# Patient Record
Sex: FEMALE | Race: BLACK OR AFRICAN AMERICAN | NOT HISPANIC OR LATINO | Employment: UNEMPLOYED | URBAN - METROPOLITAN AREA
[De-identification: names, ages, dates, MRNs, and addresses within clinical notes are randomized per-mention and may not be internally consistent; named-entity substitution may affect disease eponyms.]

---

## 2023-02-20 PROBLEM — S82.142A CLOSED FRACTURE OF LEFT TIBIAL PLATEAU: Status: ACTIVE | Noted: 2023-02-20

## 2023-02-20 PROBLEM — E66.3 OVERWEIGHT: Status: ACTIVE | Noted: 2023-02-20

## 2023-02-20 PROBLEM — Z95.810 ICD (IMPLANTABLE CARDIOVERTER-DEFIBRILLATOR) IN PLACE: Status: ACTIVE | Noted: 2023-02-20
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
## 2023-02-20 PROBLEM — H90.6 MIXED CONDUCTIVE AND SENSORINEURAL HEARING LOSS OF BOTH EARS: Status: ACTIVE | Noted: 2023-02-20

## 2023-02-20 PROBLEM — I47.29 NON-SUSTAINED VENTRICULAR TACHYCARDIA (MULTI): Status: ACTIVE | Noted: 2023-02-20

## 2023-02-20 PROBLEM — E78.5 HYPERLIPIDEMIA: Status: ACTIVE | Noted: 2023-02-20

## 2023-02-20 PROBLEM — S93.601A SPRAIN OF RIGHT FOOT: Status: ACTIVE | Noted: 2023-02-20

## 2023-02-20 PROBLEM — M25.562 LEFT KNEE PAIN: Status: ACTIVE | Noted: 2023-02-20

## 2023-02-20 PROBLEM — N18.9 CHRONIC KIDNEY DISEASE: Status: ACTIVE | Noted: 2023-02-20

## 2023-02-20 PROBLEM — H61.23 CERUMEN DEBRIS ON TYMPANIC MEMBRANE OF BOTH EARS: Status: ACTIVE | Noted: 2023-02-20

## 2023-02-20 PROBLEM — M75.101 ROTATOR CUFF TEAR ARTHROPATHY OF RIGHT SHOULDER: Status: ACTIVE | Noted: 2023-02-20

## 2023-02-20 PROBLEM — Q74.0 ASYMMETRY OF CLAVICLES: Status: ACTIVE | Noted: 2023-02-20

## 2023-02-20 PROBLEM — M25.511 RIGHT SHOULDER PAIN: Status: ACTIVE | Noted: 2023-02-20

## 2023-02-20 PROBLEM — H52.13 MYOPIA OF BOTH EYES WITH REGULAR ASTIGMATISM: Status: ACTIVE | Noted: 2023-02-20

## 2023-02-20 PROBLEM — R07.81 RIB PAIN: Status: ACTIVE | Noted: 2023-02-20

## 2023-02-20 PROBLEM — G51.0 7TH NERVE PALSY: Status: ACTIVE | Noted: 2023-02-20

## 2023-02-20 PROBLEM — G56.03 BILATERAL CARPAL TUNNEL SYNDROME: Status: ACTIVE | Noted: 2023-02-20
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
## 2023-02-20 PROBLEM — H52.223 MYOPIA OF BOTH EYES WITH REGULAR ASTIGMATISM: Status: ACTIVE | Noted: 2023-02-20

## 2023-02-20 PROBLEM — H80.93 OTOSCLEROSIS OF BOTH EARS: Status: ACTIVE | Noted: 2023-02-20

## 2023-02-20 PROBLEM — R00.1 BRADYCARDIA: Status: ACTIVE | Noted: 2023-02-20

## 2023-02-20 PROBLEM — H53.19 VITREOUS FLASHES OF BOTH EYES: Status: ACTIVE | Noted: 2023-02-20

## 2023-02-20 PROBLEM — L03.032 CELLULITIS OF FOURTH TOE OF LEFT FOOT: Status: ACTIVE | Noted: 2023-02-20

## 2023-02-20 PROBLEM — N76.0 VAGINITIS: Status: ACTIVE | Noted: 2023-02-20

## 2023-02-20 PROBLEM — J30.9 ALLERGIC RHINITIS: Status: ACTIVE | Noted: 2023-02-20

## 2023-02-20 PROBLEM — H52.4 ASTIGMATISM OF BOTH EYES WITH PRESBYOPIA: Status: ACTIVE | Noted: 2023-02-20

## 2023-02-20 PROBLEM — K21.9 GERD (GASTROESOPHAGEAL REFLUX DISEASE): Status: ACTIVE | Noted: 2023-02-20

## 2023-02-20 PROBLEM — S82.899A AVULSION FRACTURE OF ANKLE: Status: ACTIVE | Noted: 2023-02-20

## 2023-02-20 PROBLEM — S82.009A PATELLAR FRACTURE: Status: ACTIVE | Noted: 2023-02-20

## 2023-02-20 PROBLEM — M26.69 TMJ CREPITUS: Status: ACTIVE | Noted: 2023-02-20

## 2023-02-20 PROBLEM — E55.9 VITAMIN D DEFICIENCY: Status: ACTIVE | Noted: 2023-02-20

## 2023-02-20 PROBLEM — K59.00 CONSTIPATED: Status: ACTIVE | Noted: 2023-02-20

## 2023-02-20 PROBLEM — D17.22 LIPOMA OF LEFT UPPER EXTREMITY: Status: ACTIVE | Noted: 2023-02-20

## 2023-02-20 PROBLEM — K64.4 EXTERNAL HEMORRHOID: Status: ACTIVE | Noted: 2023-02-20

## 2023-02-20 PROBLEM — H81.10 BPPV (BENIGN PAROXYSMAL POSITIONAL VERTIGO): Status: ACTIVE | Noted: 2023-02-20

## 2023-02-20 PROBLEM — H66.91 OTITIS MEDIA, RIGHT: Status: ACTIVE | Noted: 2023-02-20

## 2023-02-20 PROBLEM — B18.2 CHRONIC HEPATITIS C WITHOUT HEPATIC COMA (MULTI): Status: ACTIVE | Noted: 2023-02-20

## 2023-02-20 PROBLEM — M54.2 CERVICAL PAIN: Status: ACTIVE | Noted: 2023-02-20

## 2023-02-20 PROBLEM — H43.813 PVD (POSTERIOR VITREOUS DETACHMENT), BOTH EYES: Status: ACTIVE | Noted: 2023-02-20

## 2023-02-20 PROBLEM — I10 HYPERTENSION: Status: ACTIVE | Noted: 2023-02-20

## 2023-02-20 PROBLEM — H52.03 HYPERMETROPIA OF BOTH EYES: Status: ACTIVE | Noted: 2023-02-20

## 2023-02-20 PROBLEM — L60.8 TOENAIL DEFORMITY: Status: ACTIVE | Noted: 2023-02-20

## 2023-02-20 PROBLEM — R09.81 SINUS CONGESTION: Status: ACTIVE | Noted: 2023-02-20

## 2023-02-20 PROBLEM — R04.0 NASAL BLEEDING: Status: ACTIVE | Noted: 2023-02-20

## 2023-02-20 PROBLEM — I42.9 CARDIOMYOPATHY (MULTI): Status: ACTIVE | Noted: 2023-02-20

## 2023-02-20 PROBLEM — H92.01 RIGHT EAR PAIN: Status: ACTIVE | Noted: 2023-02-20

## 2023-02-20 PROBLEM — L60.3 NAIL DYSTROPHY: Status: ACTIVE | Noted: 2023-02-20

## 2023-02-20 PROBLEM — J32.9 CHRONIC SINUSITIS: Status: ACTIVE | Noted: 2023-02-20

## 2023-02-20 PROBLEM — H33.312 RETINAL TEAR OF LEFT EYE: Status: ACTIVE | Noted: 2023-02-20

## 2023-02-20 PROBLEM — M79.675 TOE PAIN, LEFT: Status: ACTIVE | Noted: 2023-02-20

## 2023-02-20 PROBLEM — S12.100A C2 CERVICAL FRACTURE (MULTI): Status: ACTIVE | Noted: 2023-02-20

## 2023-02-20 PROBLEM — S83.402A SPRAIN OF COLLATERAL LIGAMENT OF LEFT KNEE: Status: ACTIVE | Noted: 2023-02-20

## 2023-02-20 PROBLEM — H52.203 ASTIGMATISM OF BOTH EYES WITH PRESBYOPIA: Status: ACTIVE | Noted: 2023-02-20

## 2023-02-20 PROBLEM — M12.811 ROTATOR CUFF TEAR ARTHROPATHY OF RIGHT SHOULDER: Status: ACTIVE | Noted: 2023-02-20

## 2023-02-20 PROBLEM — L08.9 TOE INFECTION: Status: ACTIVE | Noted: 2023-02-20

## 2023-02-20 PROBLEM — R63.4 WEIGHT LOSS, UNINTENTIONAL: Status: ACTIVE | Noted: 2023-02-20

## 2023-02-20 RX ORDER — METHOCARBAMOL 500 MG/1
TABLET, FILM COATED ORAL
COMMUNITY
Start: 2022-03-30

## 2023-02-20 RX ORDER — METOPROLOL SUCCINATE 50 MG/1
TABLET, EXTENDED RELEASE ORAL
COMMUNITY
End: 2023-09-12 | Stop reason: ALTCHOICE

## 2023-02-20 RX ORDER — HYDRALAZINE HYDROCHLORIDE 10 MG/1
TABLET, FILM COATED ORAL
COMMUNITY
End: 2023-12-20 | Stop reason: SDUPTHER

## 2023-02-20 RX ORDER — TIZANIDINE 4 MG/1
TABLET ORAL
COMMUNITY
Start: 2022-09-21 | End: 2023-12-20 | Stop reason: WASHOUT

## 2023-02-20 RX ORDER — SENNOSIDES 25 MG/1
TABLET, FILM COATED ORAL
COMMUNITY
Start: 2022-07-14

## 2023-02-20 RX ORDER — DICLOFENAC SODIUM 10 MG/G
GEL TOPICAL
COMMUNITY
End: 2024-04-12 | Stop reason: ALTCHOICE

## 2023-02-20 RX ORDER — ISOSORBIDE DINITRATE 20 MG/1
TABLET ORAL
COMMUNITY
Start: 2020-10-25 | End: 2023-12-20 | Stop reason: ALTCHOICE

## 2023-02-20 RX ORDER — CALCIUM CARB/VITAMIN D3/VIT K1 500-500-40
TABLET,CHEWABLE ORAL
COMMUNITY
Start: 2020-02-04

## 2023-02-20 RX ORDER — MUPIROCIN 20 MG/G
OINTMENT TOPICAL
COMMUNITY
End: 2023-12-20 | Stop reason: ALTCHOICE

## 2023-02-20 RX ORDER — ACETAMINOPHEN 500 MG
TABLET ORAL
COMMUNITY
Start: 2022-07-14 | End: 2023-10-18

## 2023-02-20 RX ORDER — AMIODARONE HYDROCHLORIDE 200 MG/1
TABLET ORAL
COMMUNITY
End: 2023-03-13 | Stop reason: SDUPTHER

## 2023-02-20 RX ORDER — DOCUSATE SODIUM 100 MG/1
CAPSULE, LIQUID FILLED ORAL
COMMUNITY
Start: 2022-02-24 | End: 2023-12-20 | Stop reason: ALTCHOICE

## 2023-02-20 RX ORDER — NACL/NAHCO3/HYALURON SOD/ALOE 0.9 %
SPRAY GEL (ML) NASAL
COMMUNITY
Start: 2021-01-26

## 2023-02-20 RX ORDER — FLUTICASONE PROPIONATE 50 MCG
SPRAY, SUSPENSION (ML) NASAL
COMMUNITY
Start: 2013-07-08 | End: 2024-03-20 | Stop reason: WASHOUT

## 2023-02-20 RX ORDER — SPIRONOLACTONE 25 MG/1
TABLET ORAL
COMMUNITY
End: 2023-09-28 | Stop reason: SDUPTHER

## 2023-02-20 RX ORDER — CETIRIZINE HYDROCHLORIDE 10 MG/1
10 TABLET ORAL DAILY PRN
COMMUNITY
Start: 2022-07-25

## 2023-02-20 RX ORDER — PSYLLIUM HUSK 3.4 G/5.8G
POWDER ORAL
COMMUNITY
Start: 2022-03-04

## 2023-03-13 ENCOUNTER — TELEPHONE (OUTPATIENT)
Dept: PRIMARY CARE | Facility: CLINIC | Age: 68
End: 2023-03-13
Payer: COMMERCIAL

## 2023-03-13 DIAGNOSIS — I47.29 NON-SUSTAINED VENTRICULAR TACHYCARDIA (MULTI): Primary | ICD-10-CM

## 2023-03-13 RX ORDER — AMIODARONE HYDROCHLORIDE 200 MG/1
200 TABLET ORAL DAILY
Qty: 30 TABLET | Refills: 0 | Status: SHIPPED | OUTPATIENT
Start: 2023-03-13 | End: 2023-04-20 | Stop reason: SDUPTHER

## 2023-03-13 NOTE — TELEPHONE ENCOUNTER
Patient called requesting refill for Amiodarone 200mg to be sent to pharmacy on file. She was last seen 12/06/2022 and has a scheduled appt for 03/21/2023

## 2023-03-21 ENCOUNTER — OFFICE VISIT (OUTPATIENT)
Dept: PRIMARY CARE | Facility: CLINIC | Age: 68
End: 2023-03-21
Payer: COMMERCIAL

## 2023-03-21 ENCOUNTER — LAB (OUTPATIENT)
Dept: LAB | Facility: LAB | Age: 68
End: 2023-03-21
Payer: COMMERCIAL

## 2023-03-21 VITALS
SYSTOLIC BLOOD PRESSURE: 121 MMHG | TEMPERATURE: 97.5 F | OXYGEN SATURATION: 93 % | BODY MASS INDEX: 23.12 KG/M2 | HEIGHT: 59 IN | WEIGHT: 114.7 LBS | HEART RATE: 69 BPM | DIASTOLIC BLOOD PRESSURE: 79 MMHG

## 2023-03-21 DIAGNOSIS — B18.2 CHRONIC HEPATITIS C WITHOUT HEPATIC COMA (MULTI): ICD-10-CM

## 2023-03-21 DIAGNOSIS — I10 PRIMARY HYPERTENSION: ICD-10-CM

## 2023-03-21 DIAGNOSIS — I50.20 HFREF (HEART FAILURE WITH REDUCED EJECTION FRACTION) (MULTI): ICD-10-CM

## 2023-03-21 DIAGNOSIS — M81.0 AGE-RELATED OSTEOPOROSIS WITHOUT CURRENT PATHOLOGICAL FRACTURE: ICD-10-CM

## 2023-03-21 DIAGNOSIS — R07.81 RIB PAIN: ICD-10-CM

## 2023-03-21 DIAGNOSIS — Z00.00 HEALTH CARE MAINTENANCE: Primary | ICD-10-CM

## 2023-03-21 DIAGNOSIS — Z12.31 ENCOUNTER FOR SCREENING MAMMOGRAM FOR MALIGNANT NEOPLASM OF BREAST: ICD-10-CM

## 2023-03-21 LAB
ALANINE AMINOTRANSFERASE (SGPT) (U/L) IN SER/PLAS: 52 U/L (ref 7–45)
ALBUMIN (G/DL) IN SER/PLAS: 4.3 G/DL (ref 3.4–5)
ALKALINE PHOSPHATASE (U/L) IN SER/PLAS: 69 U/L (ref 33–136)
ASPARTATE AMINOTRANSFERASE (SGOT) (U/L) IN SER/PLAS: 45 U/L (ref 9–39)
BILIRUBIN DIRECT (MG/DL) IN SER/PLAS: 0.1 MG/DL (ref 0–0.3)
BILIRUBIN TOTAL (MG/DL) IN SER/PLAS: 0.4 MG/DL (ref 0–1.2)
PROTEIN TOTAL: 7.6 G/DL (ref 6.4–8.2)

## 2023-03-21 PROCEDURE — 83010 ASSAY OF HAPTOGLOBIN QUANT: CPT

## 2023-03-21 PROCEDURE — 82247 BILIRUBIN TOTAL: CPT

## 2023-03-21 PROCEDURE — 1036F TOBACCO NON-USER: CPT | Performed by: STUDENT IN AN ORGANIZED HEALTH CARE EDUCATION/TRAINING PROGRAM

## 2023-03-21 PROCEDURE — 84460 ALANINE AMINO (ALT) (SGPT): CPT

## 2023-03-21 PROCEDURE — 1159F MED LIST DOCD IN RCRD: CPT | Performed by: STUDENT IN AN ORGANIZED HEALTH CARE EDUCATION/TRAINING PROGRAM

## 2023-03-21 PROCEDURE — 82172 ASSAY OF APOLIPOPROTEIN: CPT

## 2023-03-21 PROCEDURE — 83883 ASSAY NEPHELOMETRY NOT SPEC: CPT

## 2023-03-21 PROCEDURE — 99214 OFFICE O/P EST MOD 30 MIN: CPT | Performed by: STUDENT IN AN ORGANIZED HEALTH CARE EDUCATION/TRAINING PROGRAM

## 2023-03-21 PROCEDURE — 87522 HEPATITIS C REVRS TRNSCRPJ: CPT

## 2023-03-21 PROCEDURE — 3074F SYST BP LT 130 MM HG: CPT | Performed by: STUDENT IN AN ORGANIZED HEALTH CARE EDUCATION/TRAINING PROGRAM

## 2023-03-21 PROCEDURE — 80076 HEPATIC FUNCTION PANEL: CPT

## 2023-03-21 PROCEDURE — 3078F DIAST BP <80 MM HG: CPT | Performed by: STUDENT IN AN ORGANIZED HEALTH CARE EDUCATION/TRAINING PROGRAM

## 2023-03-21 PROCEDURE — 36415 COLL VENOUS BLD VENIPUNCTURE: CPT

## 2023-03-21 PROCEDURE — 82977 ASSAY OF GGT: CPT

## 2023-03-21 ASSESSMENT — PAIN SCALES - GENERAL: PAINLEVEL: 0-NO PAIN

## 2023-03-21 ASSESSMENT — ENCOUNTER SYMPTOMS: OCCASIONAL FEELINGS OF UNSTEADINESS: 0

## 2023-03-21 NOTE — PROGRESS NOTES
"Subjective   Reason for Visit: Sandy Smith is an 67 y.o. female here for a Medicare Wellness visit.          Reviewed all medications by prescribing practitioner or clinical pharmacist (such as prescriptions, OTCs, herbal therapies and supplements) and documented in the medical record.    HPI    Patient Care Team:  Tanmay Whitten MD as PCP - General  DERRICK Hidalgo-CNP as PCP - [employer] PCP     Review of Systems    Objective   Vitals:  /79 (BP Location: Right arm, Patient Position: Sitting, BP Cuff Size: Adult)   Pulse 69   Temp 36.4 °C (97.5 °F) (Temporal)   Ht 1.499 m (4' 11\")   Wt 52 kg (114 lb 11.2 oz)   SpO2 93%   BMI 23.17 kg/m²       Physical Exam    Assessment/Plan   Problem List Items Addressed This Visit          Circulatory    Hypertension       Digestive    Chronic hepatitis C without hepatic coma (CMS/HCC)    Relevant Orders    Referral to Hepatology    Hepatitis C Virus (HCV) FibroSure    Hepatitis C RNA, Quantitative, PCR    Hepatic function panel       Musculoskeletal    Rib pain     Other Visit Diagnoses       Health care maintenance    -  Primary    Relevant Orders    XR DEXA bone density    BI mammo bilateral screening tomosynthesis    Vascular US abdominal aorta anuerysm AAA screening    HFrEF (heart failure with reduced ejection fraction) (CMS/HCC)        Age-related osteoporosis without current pathological fracture        Relevant Orders    XR DEXA bone density    Encounter for screening mammogram for malignant neoplasm of breast        Relevant Orders    BI mammo bilateral screening tomosynthesis               "
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
## 2023-03-22 LAB
HCV PCR QUANT: ABNORMAL IU/ML
HCV RNA, PCR LOG: 6.15 LOG10 IU/ML

## 2023-03-24 LAB
ALPHA 2-MACROGLOBULINS, QN (LC): 407 MG/DL (ref 110–276)
ALT (SGPT) P5P(LC): 65 IU/L (ref 0–40)
APOLIPOPROTEIN A-1(LC): 180 MG/DL (ref 116–209)
BILIRUBIN, TOTAL(LC): 0.3 MG/DL (ref 0–1.2)
COMMENT:(HCVFS): ABNORMAL
FIBROSIS SCORE(HCVFS): 0.52 (ref 0–0.21)
FIBROSIS SCORING:: ABNORMAL
FIBROSIS STAGE(HCVFS): ABNORMAL
GGT(LC): 95 IU/L (ref 0–60)
HAPTOGLOBIN(LC): 125 MG/DL (ref 37–355)
HCV FIBROSURE ANALYSIS-DATA CONVERSION: ABNORMAL
INTERPRETATIONS:(HCVFS): ABNORMAL
LIMITATIONS:(HCVFS): ABNORMAL
NECROINFLAMM ACTIVITY SCORING:(HCVFS): ABNORMAL
NECROINFLAMMAT ACTIVITY GRADE(HCVFS): ABNORMAL
NECROINFLAMMAT ACTIVITY SCORE(HCVFS): 0.47 (ref 0–0.17)

## 2023-04-13 ENCOUNTER — APPOINTMENT (OUTPATIENT)
Dept: LAB | Facility: LAB | Age: 68
End: 2023-04-13
Payer: COMMERCIAL

## 2023-04-13 LAB
ALBUMIN (MG/L) IN URINE: <7 MG/L
ALBUMIN/CREATININE (UG/MG) IN URINE: NORMAL UG/MG CRT (ref 0–30)
CREATININE (MG/DL) IN URINE: 97 MG/DL (ref 20–320)

## 2023-04-20 DIAGNOSIS — I47.29 NON-SUSTAINED VENTRICULAR TACHYCARDIA (MULTI): ICD-10-CM

## 2023-04-20 RX ORDER — AMIODARONE HYDROCHLORIDE 200 MG/1
200 TABLET ORAL DAILY
Qty: 30 TABLET | Refills: 2 | Status: SHIPPED | OUTPATIENT
Start: 2023-04-20 | End: 2023-09-12 | Stop reason: ALTCHOICE

## 2023-06-02 DIAGNOSIS — M81.0 OSTEOPOROSIS WITHOUT CURRENT PATHOLOGICAL FRACTURE, UNSPECIFIED OSTEOPOROSIS TYPE: Primary | ICD-10-CM

## 2023-06-02 RX ORDER — ALENDRONATE SODIUM 70 MG/1
70 TABLET ORAL
Qty: 4 TABLET | Refills: 11 | Status: SHIPPED | OUTPATIENT
Start: 2023-06-02 | End: 2023-06-02

## 2023-06-23 ENCOUNTER — APPOINTMENT (OUTPATIENT)
Dept: PRIMARY CARE | Facility: CLINIC | Age: 68
End: 2023-06-23
Payer: COMMERCIAL

## 2023-07-19 ENCOUNTER — APPOINTMENT (OUTPATIENT)
Dept: LAB | Facility: LAB | Age: 68
End: 2023-07-19
Payer: COMMERCIAL

## 2023-07-19 LAB
ALANINE AMINOTRANSFERASE (SGPT) (U/L) IN SER/PLAS: 37 U/L (ref 7–45)
ALBUMIN (G/DL) IN SER/PLAS: 4 G/DL (ref 3.4–5)
ALKALINE PHOSPHATASE (U/L) IN SER/PLAS: 60 U/L (ref 33–136)
ALPHA-1 FETOPROTEIN (NG/ML) IN SER/PLAS: 4 NG/ML (ref 0–9)
ASPARTATE AMINOTRANSFERASE (SGOT) (U/L) IN SER/PLAS: 33 U/L (ref 9–39)
BASOPHILS (10*3/UL) IN BLOOD BY AUTOMATED COUNT: 0.02 X10E9/L (ref 0–0.1)
BASOPHILS/100 LEUKOCYTES IN BLOOD BY AUTOMATED COUNT: 0.4 % (ref 0–2)
BILIRUBIN DIRECT (MG/DL) IN SER/PLAS: 0.1 MG/DL (ref 0–0.3)
BILIRUBIN TOTAL (MG/DL) IN SER/PLAS: 0.5 MG/DL (ref 0–1.2)
EOSINOPHILS (10*3/UL) IN BLOOD BY AUTOMATED COUNT: 0.07 X10E9/L (ref 0–0.7)
EOSINOPHILS/100 LEUKOCYTES IN BLOOD BY AUTOMATED COUNT: 1.2 % (ref 0–6)
ERYTHROCYTE DISTRIBUTION WIDTH (RATIO) BY AUTOMATED COUNT: 14.5 % (ref 11.5–14.5)
ERYTHROCYTE MEAN CORPUSCULAR HEMOGLOBIN CONCENTRATION (G/DL) BY AUTOMATED: 31 G/DL (ref 32–36)
ERYTHROCYTE MEAN CORPUSCULAR VOLUME (FL) BY AUTOMATED COUNT: 90 FL (ref 80–100)
ERYTHROCYTES (10*6/UL) IN BLOOD BY AUTOMATED COUNT: 4.99 X10E12/L (ref 4–5.2)
HEMATOCRIT (%) IN BLOOD BY AUTOMATED COUNT: 45.1 % (ref 36–46)
HEMOGLOBIN (G/DL) IN BLOOD: 14 G/DL (ref 12–16)
IMMATURE GRANULOCYTES/100 LEUKOCYTES IN BLOOD BY AUTOMATED COUNT: 0.2 % (ref 0–0.9)
LEUKOCYTES (10*3/UL) IN BLOOD BY AUTOMATED COUNT: 5.6 X10E9/L (ref 4.4–11.3)
LYMPHOCYTES (10*3/UL) IN BLOOD BY AUTOMATED COUNT: 2 X10E9/L (ref 1.2–4.8)
LYMPHOCYTES/100 LEUKOCYTES IN BLOOD BY AUTOMATED COUNT: 35.6 % (ref 13–44)
MONOCYTES (10*3/UL) IN BLOOD BY AUTOMATED COUNT: 0.46 X10E9/L (ref 0.1–1)
MONOCYTES/100 LEUKOCYTES IN BLOOD BY AUTOMATED COUNT: 8.2 % (ref 2–10)
NEUTROPHILS (10*3/UL) IN BLOOD BY AUTOMATED COUNT: 3.06 X10E9/L (ref 1.2–7.7)
NEUTROPHILS/100 LEUKOCYTES IN BLOOD BY AUTOMATED COUNT: 54.4 % (ref 40–80)
NRBC (PER 100 WBCS) BY AUTOMATED COUNT: 0 /100 WBC (ref 0–0)
PLATELETS (10*3/UL) IN BLOOD AUTOMATED COUNT: 238 X10E9/L (ref 150–450)
PROTEIN TOTAL: 7.3 G/DL (ref 6.4–8.2)

## 2023-07-21 LAB
CHOLESTEROL (MG/DL) IN SER/PLAS: 191 MG/DL (ref 0–199)
CHOLESTEROL IN HDL (MG/DL) IN SER/PLAS: 74.4 MG/DL
CHOLESTEROL/HDL RATIO: 2.6
LDL: 97 MG/DL (ref 0–99)
THYROTROPIN (MIU/L) IN SER/PLAS BY DETECTION LIMIT <= 0.05 MIU/L: 6.54 MIU/L (ref 0.44–3.98)
THYROXINE (T4) FREE (NG/DL) IN SER/PLAS: 1.21 NG/DL (ref 0.78–1.48)
TRIGLYCERIDE (MG/DL) IN SER/PLAS: 98 MG/DL (ref 0–149)
VLDL: 20 MG/DL (ref 0–40)

## 2023-08-16 ENCOUNTER — APPOINTMENT (OUTPATIENT)
Dept: LAB | Facility: LAB | Age: 68
End: 2023-08-16
Payer: COMMERCIAL

## 2023-08-16 LAB
APPEARANCE, URINE: CLEAR
BILIRUBIN, URINE: NEGATIVE
BLOOD, URINE: NEGATIVE
COLOR, URINE: YELLOW
GLUCOSE, URINE: ABNORMAL MG/DL
KETONES, URINE: NEGATIVE MG/DL
LEUKOCYTE ESTERASE, URINE: NEGATIVE
NITRITE, URINE: NEGATIVE
PH, URINE: 5 (ref 5–8)
PROTEIN, URINE: NEGATIVE MG/DL
SPECIFIC GRAVITY, URINE: 1.02 (ref 1–1.03)
UROBILINOGEN, URINE: <2 MG/DL (ref 0–1.9)

## 2023-08-17 LAB — URINE CULTURE: NORMAL

## 2023-09-11 ENCOUNTER — OFFICE VISIT (OUTPATIENT)
Dept: PRIMARY CARE | Facility: CLINIC | Age: 68
End: 2023-09-11
Payer: COMMERCIAL

## 2023-09-11 ENCOUNTER — LAB (OUTPATIENT)
Dept: LAB | Facility: LAB | Age: 68
End: 2023-09-11
Payer: COMMERCIAL

## 2023-09-11 VITALS
SYSTOLIC BLOOD PRESSURE: 113 MMHG | BODY MASS INDEX: 23.49 KG/M2 | DIASTOLIC BLOOD PRESSURE: 74 MMHG | TEMPERATURE: 98.6 F | OXYGEN SATURATION: 96 % | HEART RATE: 88 BPM | WEIGHT: 116.3 LBS

## 2023-09-11 DIAGNOSIS — R73.03 PREDIABETES: ICD-10-CM

## 2023-09-11 DIAGNOSIS — F45.8 BRUXISM (TEETH GRINDING): Primary | ICD-10-CM

## 2023-09-11 LAB
ESTIMATED AVERAGE GLUCOSE FOR HBA1C: 108 MG/DL
HEMOGLOBIN A1C/HEMOGLOBIN TOTAL IN BLOOD: 5.4 %

## 2023-09-11 PROCEDURE — 1036F TOBACCO NON-USER: CPT

## 2023-09-11 PROCEDURE — 3078F DIAST BP <80 MM HG: CPT

## 2023-09-11 PROCEDURE — 36415 COLL VENOUS BLD VENIPUNCTURE: CPT

## 2023-09-11 PROCEDURE — 3074F SYST BP LT 130 MM HG: CPT

## 2023-09-11 PROCEDURE — 83036 HEMOGLOBIN GLYCOSYLATED A1C: CPT

## 2023-09-11 PROCEDURE — 99213 OFFICE O/P EST LOW 20 MIN: CPT

## 2023-09-11 PROCEDURE — 1159F MED LIST DOCD IN RCRD: CPT

## 2023-09-11 PROCEDURE — 1126F AMNT PAIN NOTED NONE PRSNT: CPT

## 2023-09-11 NOTE — PROGRESS NOTES
Subjective   Patient ID: Sandy Smith is a 68 y.o. female with PMHx of HTN, HFrEF, NICM, h/o PVC (s/p RFA), non-sustained Vt (on ICD), chronic hepatitis C (pending treatment) who presents for right jaw pain.     HPI     #Teeth grinding  #Right jaw pain   - for a month  - reports of mild pain   - Self noticed grinding her teeth during sleep  - seen dentist already   - requesting mouth guard   - on alendronate 70mg daily (started recently)   - denies headache, vision changes. No fever/chills.    #HTN  #Non-ischemic Cardiomyopathy, HFrEF, PVCs (s/p RFA) and non-sustained ventricular tachycardia (ICD implantation)  - BP /74   - seen cardiology in August 2023 (switched from amiodarone to metoprolol 25mg daily, pending treatment for chronic hepatitis C. Plan for repeat echo)  - Denies CP/SOB/lightheadedness/palpitation       Review of Systems  12pt ROS negative except as mentioned in HPI     Objective   /74 (BP Location: Right arm, Patient Position: Sitting, BP Cuff Size: Adult)   Pulse 88   Temp 37 °C (98.6 °F) (Temporal)   Wt 52.8 kg (116 lb 4.8 oz)   SpO2 96%   BMI 23.49 kg/m²     Physical Exam  Constitutional:       Appearance: Normal appearance.   HENT:      Head:      Comments: Mild right TMJ tenderness on palpation, but no clicking. Good jaw strength.   No temporal tenderness.        Right Ear: Tympanic membrane and ear canal normal.      Left Ear: Tympanic membrane and ear canal normal.   Eyes:      Extraocular Movements: Extraocular movements intact.      Conjunctiva/sclera: Conjunctivae normal.   Cardiovascular:      Rate and Rhythm: Normal rate and regular rhythm.      Pulses: Normal pulses.      Heart sounds: Normal heart sounds.   Pulmonary:      Effort: Pulmonary effort is normal.      Breath sounds: Normal breath sounds.   Abdominal:      General: Abdomen is flat. Bowel sounds are normal.      Palpations: Abdomen is soft.   Musculoskeletal:         General: No swelling or tenderness.       Cervical back: Normal range of motion.   Skin:     General: Skin is warm.      Capillary Refill: Capillary refill takes less than 2 seconds.   Neurological:      Mental Status: She is alert and oriented to person, place, and time.   Psychiatric:         Mood and Affect: Mood normal.         Behavior: Behavior normal.       Lab Results   Component Value Date    HGBA1C 5.8 (A) 01/28/2022      Lab Results   Component Value Date    CREATININE 1.03 04/08/2023    BUN 13 04/08/2023     04/08/2023    K 4.7 04/08/2023     (H) 04/08/2023    CO2 26 04/08/2023      Assessment/Plan   68 y.o. female with PMHx of HTN, HFrEF, NICM, h/o PVC (s/p RFA), non-sustained Vt (on ICD), chronic hepatitis C (pending treatment) who presents for right jaw pain. Clinically stable. Mild tenderness on palpation on TMJ, likely triggered by bruxism. No concerning signs of giant cell arteritis or acute infection or trauma.     #Right TMJ pain 2/2 bruxism   - Mouthguard ordered   - Recommended exercises to strengthen jaw muscles   - Apply heat for pain relief     #HM  - Due for pneumnia, shingle and flu shot: Pt declined  - h/o prediabetes, due for repeat A1c -> ordered. Other labs up to date     RTC in 3 months for follow up.     Discussed with Dr Yan.     Juan David Cortez MD  Family Medicine PGY2     Problem List Items Addressed This Visit    None  Visit Diagnoses       Bruxism (teeth grinding)    -  Primary    Relevant Orders    Miscellaneous DME    Prediabetes        Relevant Orders    Hemoglobin A1c

## 2023-09-12 RX ORDER — METOPROLOL SUCCINATE 25 MG/1
25 TABLET, EXTENDED RELEASE ORAL DAILY
COMMUNITY
Start: 2023-09-03 | End: 2024-03-27 | Stop reason: SDUPTHER

## 2023-09-12 NOTE — PROGRESS NOTES
I reviewed with the resident the medical history and the resident’s findings on physical examination.  I discussed with the resident the patient’s diagnosis and concur with the treatment plan as documented in the resident note.     Joaquín Yan MD

## 2023-09-28 DIAGNOSIS — I50.20 HFREF (HEART FAILURE WITH REDUCED EJECTION FRACTION) (MULTI): Primary | ICD-10-CM

## 2023-09-28 RX ORDER — SPIRONOLACTONE 25 MG/1
25 TABLET ORAL DAILY
Qty: 90 TABLET | Refills: 1 | Status: SHIPPED | OUTPATIENT
Start: 2023-09-28 | End: 2023-12-20 | Stop reason: SDUPTHER

## 2023-09-28 RX ORDER — SPIRONOLACTONE 25 MG/1
25 TABLET ORAL DAILY
Qty: 90 TABLET | Refills: 1 | Status: SHIPPED | OUTPATIENT
Start: 2023-09-28 | End: 2023-09-28

## 2023-10-02 ENCOUNTER — PHARMACY VISIT (OUTPATIENT)
Dept: PHARMACY | Facility: CLINIC | Age: 68
End: 2023-10-02
Payer: COMMERCIAL

## 2023-10-02 ENCOUNTER — HOSPITAL ENCOUNTER (EMERGENCY)
Facility: HOSPITAL | Age: 68
Discharge: HOME | End: 2023-10-02
Payer: COMMERCIAL

## 2023-10-02 VITALS
SYSTOLIC BLOOD PRESSURE: 131 MMHG | TEMPERATURE: 98 F | RESPIRATION RATE: 16 BRPM | HEIGHT: 59 IN | DIASTOLIC BLOOD PRESSURE: 80 MMHG | BODY MASS INDEX: 23.18 KG/M2 | OXYGEN SATURATION: 100 % | HEART RATE: 70 BPM | WEIGHT: 115 LBS

## 2023-10-02 DIAGNOSIS — H66.001 NON-RECURRENT ACUTE SUPPURATIVE OTITIS MEDIA OF RIGHT EAR WITHOUT SPONTANEOUS RUPTURE OF TYMPANIC MEMBRANE: Primary | ICD-10-CM

## 2023-10-02 PROCEDURE — RXMED WILLOW AMBULATORY MEDICATION CHARGE

## 2023-10-02 PROCEDURE — 99283 EMERGENCY DEPT VISIT LOW MDM: CPT | Performed by: PHYSICIAN ASSISTANT

## 2023-10-02 PROCEDURE — 99283 EMERGENCY DEPT VISIT LOW MDM: CPT

## 2023-10-02 RX ORDER — AMOXICILLIN AND CLAVULANATE POTASSIUM 875; 125 MG/1; MG/1
1 TABLET, FILM COATED ORAL EVERY 12 HOURS
Qty: 14 TABLET | Refills: 0 | Status: SHIPPED | OUTPATIENT
Start: 2023-10-02 | End: 2023-10-09

## 2023-10-02 ASSESSMENT — COLUMBIA-SUICIDE SEVERITY RATING SCALE - C-SSRS
1. IN THE PAST MONTH, HAVE YOU WISHED YOU WERE DEAD OR WISHED YOU COULD GO TO SLEEP AND NOT WAKE UP?: NO
2. HAVE YOU ACTUALLY HAD ANY THOUGHTS OF KILLING YOURSELF?: NO
6. HAVE YOU EVER DONE ANYTHING, STARTED TO DO ANYTHING, OR PREPARED TO DO ANYTHING TO END YOUR LIFE?: NO

## 2023-10-02 ASSESSMENT — PAIN DESCRIPTION - LOCATION: LOCATION: EAR

## 2023-10-02 ASSESSMENT — PAIN DESCRIPTION - ORIENTATION: ORIENTATION: RIGHT

## 2023-10-02 ASSESSMENT — PAIN - FUNCTIONAL ASSESSMENT: PAIN_FUNCTIONAL_ASSESSMENT: 0-10

## 2023-10-02 ASSESSMENT — PAIN SCALES - GENERAL: PAINLEVEL_OUTOF10: 10 - WORST POSSIBLE PAIN

## 2023-10-02 ASSESSMENT — PAIN DESCRIPTION - DIRECTION: RADIATING_TOWARDS: JAW

## 2023-10-02 NOTE — ED PROVIDER NOTES
HPI   Chief Complaint   Patient presents with    Earache       Sandy Smith is a 68 y.o. female presents with Earache and right jaw pain for 1 week that is worsening since last night.  Patient notes tenderness to the TMJ region on the right for 1 day.  Denies injury, drainage, fever, URI symptoms, tooth ache.  Of note, alendronate is on patient's med list, however she states she took only 1 pill months ago.        History provided by:  Patient                      [place] Coma Scale Score: 15                  Patient History   Past Medical History:   Diagnosis Date    Encounter for general adult medical examination without abnormal findings 01/31/2022    Encounter for initial preventive physical examination covered by Medicare    Otitis media, unspecified, right ear 01/31/2014    Acute right otitis media    Personal history of other diseases of the digestive system 07/21/2013    History of esophageal reflux    Personal history of other diseases of the musculoskeletal system and connective tissue 07/21/2013    History of low back pain    Personal history of other specified conditions 07/21/2013    History of angioedema     Past Surgical History:   Procedure Laterality Date    COLONOSCOPY  08/23/2013    Colonoscopy (Fiberoptic)    CT ANGIO HEART CORONARY  11/20/2018    CT HEART CORONARY ANGIOGRAM 11/20/2018 [facility] ANCILLARY LEGACY    CT NECK ANGIO W AND WO IV CONTRAST  1/29/2022    CT NECK ANGIO W AND WO IV CONTRAST 1/29/2022 [facility] CLINICAL LEGACY    OTHER SURGICAL HISTORY  02/08/2021    Pacemaker insertion    OTHER SURGICAL HISTORY  11/08/2018    Umbilical hernia repair    OTHER SURGICAL HISTORY  12/05/2013    Wrist Surgery     Family History   Problem Relation Name Age of Onset    Aneurysm Mother      Heart disease Mother      Hyperlipidemia Mother      Hypertension Mother      Heart disease Father      Hypertension Father      Other (cardiac disorder) Sister      Coronary artery disease Sister      Other (cardiac  disorder) Brother      Coronary artery disease Brother       Social History     Tobacco Use    Smoking status: Former     Types: Cigarettes     Passive exposure: Past    Smokeless tobacco: Never   Substance Use Topics    Alcohol use: Never    Drug use: Never       Physical Exam   ED Triage Vitals [10/02/23 1151]   Temp Heart Rate Resp BP   36.7 °C (98 °F) 70 16 131/80      SpO2 Temp src Heart Rate Source Patient Position   100 % -- Monitor Sitting      BP Location FiO2 (%)     Right arm --       Physical Exam  HENT:      Head:      Jaw: Tenderness present.      Salivary Glands: Right salivary gland is not diffusely enlarged. Left salivary gland is not diffusely enlarged.      Right Ear: Ear canal and external ear normal. A middle ear effusion is present. Tympanic membrane is injected and bulging.      Left Ear: Tympanic membrane, ear canal and external ear normal.      Mouth/Throat:      Mouth: Mucous membranes are moist.   Eyes:      Conjunctiva/sclera: Conjunctivae normal.   Cardiovascular:      Rate and Rhythm: Normal rate and regular rhythm.   Pulmonary:      Effort: Pulmonary effort is normal.   Musculoskeletal:      Cervical back: Normal range of motion and neck supple.   Skin:     General: Skin is warm.   Neurological:      Mental Status: She is alert and oriented to person, place, and time.   Psychiatric:         Mood and Affect: Mood normal.         ED Course & MDM   Diagnoses as of 10/02/23 1355   Non-recurrent acute suppurative otitis media of right ear without spontaneous rupture of tympanic membrane       Medical Decision Making  Low concern for otitis externa, mastoiditis.  Although patient took 1 bisphosphonate months ago, I do not suspect jaw osteonecrosis, rather referred right ear pain in setting of acute otitis media.  Advised if jaw pain continues after completion of Augmentin to mention it to her primary provider for further evaluation        Procedure  Procedures     Evens Estrada,  KYLEE  10/02/23 1403

## 2023-10-14 ENCOUNTER — PHARMACY VISIT (OUTPATIENT)
Dept: PHARMACY | Facility: CLINIC | Age: 68
End: 2023-10-14
Payer: COMMERCIAL

## 2023-10-14 PROCEDURE — RXMED WILLOW AMBULATORY MEDICATION CHARGE

## 2023-10-16 ENCOUNTER — APPOINTMENT (OUTPATIENT)
Dept: PRIMARY CARE | Facility: CLINIC | Age: 68
End: 2023-10-16
Payer: COMMERCIAL

## 2023-10-16 ENCOUNTER — EVALUATION (OUTPATIENT)
Dept: PHYSICAL THERAPY | Facility: HOSPITAL | Age: 68
End: 2023-10-16
Payer: COMMERCIAL

## 2023-10-16 DIAGNOSIS — S82.142D DISPLACED BICONDYLAR FRACTURE OF LEFT TIBIA, SUBSEQUENT ENCOUNTER FOR CLOSED FRACTURE WITH ROUTINE HEALING: ICD-10-CM

## 2023-10-16 DIAGNOSIS — M25.562 LEFT KNEE PAIN: Primary | ICD-10-CM

## 2023-10-16 DIAGNOSIS — S83.402A SPRAIN OF UNSPECIFIED COLLATERAL LIGAMENT OF LEFT KNEE, INITIAL ENCOUNTER: ICD-10-CM

## 2023-10-16 PROCEDURE — 97110 THERAPEUTIC EXERCISES: CPT | Mod: GP | Performed by: PHYSICAL THERAPIST

## 2023-10-16 PROCEDURE — 97161 PT EVAL LOW COMPLEX 20 MIN: CPT | Mod: GP | Performed by: PHYSICAL THERAPIST

## 2023-10-16 ASSESSMENT — ENCOUNTER SYMPTOMS
LOSS OF SENSATION IN FEET: 0
DEPRESSION: 0
OCCASIONAL FEELINGS OF UNSTEADINESS: 0

## 2023-10-16 NOTE — Clinical Note
October 16, 2023     Patient: Sandy Smith   YOB: 1955   Date of Visit: 10/16/2023       To Whom it May Concern:    Sandy Smith was seen in my clinic on 10/16/2023. She {Return to school/sport:36187}.    If you have any questions or concerns, please don't hesitate to call.         Sincerely,          Tamiko Jonas, PT        CC: No Recipients

## 2023-10-16 NOTE — Clinical Note
October 16, 2023     Patient: Sandy Smith   YOB: 1955   Date of Visit: 10/16/2023       To Whom It May Concern:    It is my medical opinion that Sandy Smith {Work release (duty restriction):62850}.    If you have any questions or concerns, please don't hesitate to call.         Sincerely,        Tamiko Jonas, PT    CC: No Recipients
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
## 2023-10-17 ENCOUNTER — APPOINTMENT (OUTPATIENT)
Dept: RADIOLOGY | Facility: HOSPITAL | Age: 68
End: 2023-10-17
Payer: COMMERCIAL

## 2023-10-17 ENCOUNTER — HOSPITAL ENCOUNTER (EMERGENCY)
Facility: HOSPITAL | Age: 68
Discharge: HOME | End: 2023-10-18
Payer: COMMERCIAL

## 2023-10-17 DIAGNOSIS — K08.89 PAIN, DENTAL: Primary | ICD-10-CM

## 2023-10-17 DIAGNOSIS — R42 VERTIGO: ICD-10-CM

## 2023-10-17 LAB
ALBUMIN SERPL BCP-MCNC: 4.3 G/DL (ref 3.4–5)
ANION GAP SERPL CALC-SCNC: 15 MMOL/L (ref 10–20)
BASOPHILS # BLD AUTO: 0.03 X10*3/UL (ref 0–0.1)
BASOPHILS NFR BLD AUTO: 0.4 %
BUN SERPL-MCNC: 15 MG/DL (ref 6–23)
CALCIUM SERPL-MCNC: 9.4 MG/DL (ref 8.6–10.6)
CHLORIDE SERPL-SCNC: 105 MMOL/L (ref 98–107)
CO2 SERPL-SCNC: 27 MMOL/L (ref 21–32)
CREAT SERPL-MCNC: 1.17 MG/DL (ref 0.5–1.05)
EOSINOPHIL # BLD AUTO: 0.12 X10*3/UL (ref 0–0.7)
EOSINOPHIL NFR BLD AUTO: 1.7 %
ERYTHROCYTE [DISTWIDTH] IN BLOOD BY AUTOMATED COUNT: 13.2 % (ref 11.5–14.5)
GFR SERPL CREATININE-BSD FRML MDRD: 51 ML/MIN/1.73M*2
GLUCOSE SERPL-MCNC: 81 MG/DL (ref 74–99)
HCT VFR BLD AUTO: 45.6 % (ref 36–46)
HGB BLD-MCNC: 14.9 G/DL (ref 12–16)
IMM GRANULOCYTES # BLD AUTO: 0.01 X10*3/UL (ref 0–0.7)
IMM GRANULOCYTES NFR BLD AUTO: 0.1 % (ref 0–0.9)
LYMPHOCYTES # BLD AUTO: 2.79 X10*3/UL (ref 1.2–4.8)
LYMPHOCYTES NFR BLD AUTO: 39.5 %
MCH RBC QN AUTO: 27.8 PG (ref 26–34)
MCHC RBC AUTO-ENTMCNC: 32.7 G/DL (ref 32–36)
MCV RBC AUTO: 85 FL (ref 80–100)
MONOCYTES # BLD AUTO: 0.66 X10*3/UL (ref 0.1–1)
MONOCYTES NFR BLD AUTO: 9.3 %
NEUTROPHILS # BLD AUTO: 3.46 X10*3/UL (ref 1.2–7.7)
NEUTROPHILS NFR BLD AUTO: 49 %
NRBC BLD-RTO: 0 /100 WBCS (ref 0–0)
PHOSPHATE SERPL-MCNC: 4.4 MG/DL (ref 2.5–4.9)
PLATELET # BLD AUTO: 221 X10*3/UL (ref 150–450)
PMV BLD AUTO: 11.3 FL (ref 7.5–11.5)
POTASSIUM SERPL-SCNC: 5.1 MMOL/L (ref 3.5–5.3)
RBC # BLD AUTO: 5.36 X10*6/UL (ref 4–5.2)
SODIUM SERPL-SCNC: 142 MMOL/L (ref 136–145)
WBC # BLD AUTO: 7.1 X10*3/UL (ref 4.4–11.3)

## 2023-10-17 PROCEDURE — 70487 CT MAXILLOFACIAL W/DYE: CPT | Performed by: RADIOLOGY

## 2023-10-17 PROCEDURE — 99285 EMERGENCY DEPT VISIT HI MDM: CPT | Performed by: NURSE PRACTITIONER

## 2023-10-17 PROCEDURE — 2500000004 HC RX 250 GENERAL PHARMACY W/ HCPCS (ALT 636 FOR OP/ED): Mod: SE | Performed by: NURSE PRACTITIONER

## 2023-10-17 PROCEDURE — 85025 COMPLETE CBC W/AUTO DIFF WBC: CPT | Performed by: NURSE PRACTITIONER

## 2023-10-17 PROCEDURE — 99284 EMERGENCY DEPT VISIT MOD MDM: CPT | Mod: 25

## 2023-10-17 PROCEDURE — 36415 COLL VENOUS BLD VENIPUNCTURE: CPT | Performed by: NURSE PRACTITIONER

## 2023-10-17 PROCEDURE — 2550000001 HC RX 255 CONTRASTS: Mod: SE | Performed by: NURSE PRACTITIONER

## 2023-10-17 PROCEDURE — 80069 RENAL FUNCTION PANEL: CPT | Performed by: NURSE PRACTITIONER

## 2023-10-17 PROCEDURE — 2500000001 HC RX 250 WO HCPCS SELF ADMINISTERED DRUGS (ALT 637 FOR MEDICARE OP): Mod: SE | Performed by: NURSE PRACTITIONER

## 2023-10-17 PROCEDURE — 70487 CT MAXILLOFACIAL W/DYE: CPT | Mod: MG

## 2023-10-17 RX ORDER — ACETAMINOPHEN 325 MG/1
325 TABLET ORAL ONCE
Status: COMPLETED | OUTPATIENT
Start: 2023-10-17 | End: 2023-10-17

## 2023-10-17 RX ORDER — OXYCODONE HYDROCHLORIDE 5 MG/1
5 TABLET ORAL ONCE
Status: DISCONTINUED | OUTPATIENT
Start: 2023-10-17 | End: 2023-10-17

## 2023-10-17 RX ORDER — ACETAMINOPHEN 325 MG/1
650 TABLET ORAL ONCE
Status: COMPLETED | OUTPATIENT
Start: 2023-10-17 | End: 2023-10-17

## 2023-10-17 RX ORDER — MECLIZINE HCL 12.5 MG 12.5 MG/1
25 TABLET ORAL ONCE
Status: COMPLETED | OUTPATIENT
Start: 2023-10-17 | End: 2023-10-17

## 2023-10-17 RX ADMIN — ACETAMINOPHEN 650 MG: 325 TABLET ORAL at 22:01

## 2023-10-17 RX ADMIN — ACETAMINOPHEN 325 MG: 325 TABLET ORAL at 23:49

## 2023-10-17 RX ADMIN — IOHEXOL 80 ML: 350 INJECTION, SOLUTION INTRAVENOUS at 23:36

## 2023-10-17 RX ADMIN — SODIUM CHLORIDE 1000 ML: 9 INJECTION, SOLUTION INTRAVENOUS at 23:49

## 2023-10-17 RX ADMIN — MECLIZINE 25 MG: 12.5 TABLET ORAL at 22:01

## 2023-10-17 ASSESSMENT — COLUMBIA-SUICIDE SEVERITY RATING SCALE - C-SSRS
1. IN THE PAST MONTH, HAVE YOU WISHED YOU WERE DEAD OR WISHED YOU COULD GO TO SLEEP AND NOT WAKE UP?: NO
6. HAVE YOU EVER DONE ANYTHING, STARTED TO DO ANYTHING, OR PREPARED TO DO ANYTHING TO END YOUR LIFE?: NO
2. HAVE YOU ACTUALLY HAD ANY THOUGHTS OF KILLING YOURSELF?: NO

## 2023-10-17 ASSESSMENT — LIFESTYLE VARIABLES
REASON UNABLE TO ASSESS: NO
EVER FELT BAD OR GUILTY ABOUT YOUR DRINKING: NO
EVER HAD A DRINK FIRST THING IN THE MORNING TO STEADY YOUR NERVES TO GET RID OF A HANGOVER: NO
HAVE YOU EVER FELT YOU SHOULD CUT DOWN ON YOUR DRINKING: NO
HAVE PEOPLE ANNOYED YOU BY CRITICIZING YOUR DRINKING: NO

## 2023-10-18 ENCOUNTER — PHARMACY VISIT (OUTPATIENT)
Dept: PHARMACY | Facility: CLINIC | Age: 68
End: 2023-10-18
Payer: COMMERCIAL

## 2023-10-18 VITALS
OXYGEN SATURATION: 98 % | DIASTOLIC BLOOD PRESSURE: 96 MMHG | HEART RATE: 60 BPM | SYSTOLIC BLOOD PRESSURE: 146 MMHG | TEMPERATURE: 97.4 F | RESPIRATION RATE: 16 BRPM

## 2023-10-18 PROCEDURE — RXMED WILLOW AMBULATORY MEDICATION CHARGE

## 2023-10-18 PROCEDURE — 2500000001 HC RX 250 WO HCPCS SELF ADMINISTERED DRUGS (ALT 637 FOR MEDICARE OP): Mod: SE | Performed by: NURSE PRACTITIONER

## 2023-10-18 RX ORDER — ACETAMINOPHEN 325 MG/1
650 TABLET ORAL EVERY 6 HOURS PRN
Qty: 30 TABLET | Refills: 0 | Status: SHIPPED | OUTPATIENT
Start: 2023-10-18

## 2023-10-18 RX ORDER — MECLIZINE HYDROCHLORIDE 25 MG/1
25 TABLET ORAL 3 TIMES DAILY PRN
Qty: 15 TABLET | Refills: 0 | Status: SHIPPED | OUTPATIENT
Start: 2023-10-18 | End: 2023-10-28

## 2023-10-18 RX ORDER — AMOXICILLIN AND CLAVULANATE POTASSIUM 875; 125 MG/1; MG/1
875 TABLET, FILM COATED ORAL ONCE
Status: COMPLETED | OUTPATIENT
Start: 2023-10-18 | End: 2023-10-18

## 2023-10-18 RX ORDER — AMOXICILLIN AND CLAVULANATE POTASSIUM 875; 125 MG/1; MG/1
875 TABLET, FILM COATED ORAL EVERY 12 HOURS
Qty: 20 TABLET | Refills: 0 | Status: SHIPPED | OUTPATIENT
Start: 2023-10-18 | End: 2023-10-28

## 2023-10-18 RX ADMIN — AMOXICILLIN AND CLAVULANATE POTASSIUM 875 MG: 875; 125 TABLET, FILM COATED ORAL at 01:24

## 2023-10-18 NOTE — ED PROVIDER NOTES
"Emergency Department Encounter  [facility] EMERGENCY MEDICINE    Patient: Sandy Smith  MRN: [id number]  : 1955  Date of Evaluation: 10/17/2023  ED Provider: ARIELA Marquez      Chief Complaint       Chief Complaint   Patient presents with    Earache        Limitations to History: none  Historian: patient  Records reviewed: EMR inpatient and outpatient notes, Care Everywhere    This is a 68-year-old female with a PMH of hypertension, a flutter s/p defibrillator who presents to the emergency room feeling as if her ears are \"clogged.\"  Patient states that she has having difficulty hearing out of both of her ears which started 2 weeks ago.  Patient states that she has right ear pain. Patient states that today she developed vertigo.  Denies any drainage, fevers or chills.  Patient was recently seen in the emergency room the beginning of October and was prescribed oral antibiotics for otitis media on the right.  Denies any recent injury or trauma.  Patient states that she does not clean out her ears. Patient states her symptoms improved while taking antibiotics.     PMH: HTN, a-flutter  PSH: Defibrillator  Allergies: Keflex  Social HX: Denies smoking, alcohol or drug use.  Family HX: No family history pertinent to current presenting problem  Medications: Reviewed per EMR      ROS:     Review of Systems   HENT:  Positive for ear pain and tinnitus.      14 systems reviewed and otherwise acutely negative except as in the [place].    Physical Exam:    Appearance: Alert, oriented , cooperative,  in no acute distress.     Skin: Intact,  dry skin, no lesions, rash, petechiae or purpura.     Eyes: PERRLA, EOMs intact.    ENT: Hearing grossly intact. External auditory canals patent, tympanic membranes intact with visible landmarks. Nares patent, mucus membranes moist. Dentition without lesions. Pharynx clear, uvula midline.     Neck: Supple, without meningismus.     Pulmonary: Clear bilaterally " with good chest wall excursion. No rales, rhonchi or wheezing. No accessory muscle use or stridor.    Cardiac: Normal S1, S2 without murmur, rub, gallop or extrasystole. No JVD, Carotids without bruits.    Abdomen: Soft, nontender, active bowel sounds.  No palpable organomegaly.  No rebound or guarding.      Musculoskeletal: Full range of motion. no pain, edema, or deformity. Pulses full and equal. No cyanosis, clubbing, or edema.    Psychiatric: Appropriate mood and affect.       Past History     Past Medical History:   Diagnosis Date    Encounter for general adult medical examination without abnormal findings 01/31/2022    Encounter for initial preventive physical examination covered by Medicare    Otitis media, unspecified, right ear 01/31/2014    Acute right otitis media    Personal history of other diseases of the digestive system 07/21/2013    History of esophageal reflux    Personal history of other diseases of the musculoskeletal system and connective tissue 07/21/2013    History of low back pain    Personal history of other specified conditions 07/21/2013    History of angioedema     Past Surgical History:   Procedure Laterality Date    COLONOSCOPY  08/23/2013    Colonoscopy (Fiberoptic)    CT ANGIO HEART CORONARY  11/20/2018    CT HEART CORONARY ANGIOGRAM 11/20/2018 [facility] ANCILLARY LEGACY    CT NECK ANGIO W AND WO IV CONTRAST  1/29/2022    CT NECK ANGIO W AND WO IV CONTRAST 1/29/2022 [facility] CLINICAL LEGACY    OTHER SURGICAL HISTORY  02/08/2021    Pacemaker insertion    OTHER SURGICAL HISTORY  11/08/2018    Umbilical hernia repair    OTHER SURGICAL HISTORY  12/05/2013    Wrist Surgery         Medications/Allergies     Previous Medications    ACETAMINOPHEN (TYLENOL) 500 MG TABLET    TAKE 2 TABLETS BY MOUTH THREE TIMES A DAY AS NEEDED FOR PAIN    ALENDRONATE (FOSAMAX) 70 MG TABLET    TAKE 1 TABLET BY MOUTH EVERY 7 DAYS. TAKE IN THE MORNING WITH A FULL GLASS OF WATER, ON AN EMPTY STOMACH, AND DO NOT TAKE ANYTHING  ELSE BY MOUTH OR LIE FOR THE NEXT 30 MINUTES.    CALCIUM CARBONATE (TUMS) 200 MG CALCIUM CHEWABLE TABLET    CHEW 1 TABLET BY MOUTH TWO TIMES A DAY    CALCIUM CARBONATE 215 MG CALCIUM (500 MG) TABLET,CHEWABLE    Chew 1 tablet 2 times a day.    CETIRIZINE (ZYRTEC) 10 MG TABLET    TAKE 1 TABLET BY MOUTH EVERYDAY AT BEDTIME    CHOLECALCIFEROL, VITAMIN D3, 10 MCG (400 UNIT) CAPSULE    TAKE 1 CAPSULE Daily    CICLOPIROX (PENLAC) 8 % SOLUTION    APPLY TO AFFECTED TOENAILS ONCE DAILY.    DICLOFENAC SODIUM (VOLTAREN) 1 % GEL GEL    Apply sparingly to affected areas three times a day as needed for pain    DOCUSATE SODIUM (COLACE) 100 MG CAPSULE    TAKE 1 CAPSULE TWICE DAILY AS NEEDED.    EMPAGLIFLOZIN (JARDIANCE) 10 MG    TAKE 1 TABLET BY MOUTH ONCE DAILY    EMPAGLIFLOZIN (JARDIANCE) 10 MG    TAKE 1 TABLET BY MOUTH ONCE DAILY    FLUTICASONE (FLONASE) 50 MCG/ACTUATION NASAL SPRAY    USE 1 TO 2 SPRAYS IN EACH NOSTRIL ONCE DAILY.    HYDRALAZINE (APRESOLINE) 10 MG TABLET    TAKE 1 TABLET Every 8 hours    HYDRALAZINE (APRESOLINE) 10 MG TABLET    TAKE 1 TABLET BY MOUTH EVERY 8 HOURS    ISOSORBIDE DINITRATE (ISORDIL) 20 MG TABLET    Take 1 tablet by mouth three times a day    ISOSORBIDE DINITRATE (ISORDIL) 20 MG TABLET    TAKE 1 TABLET BY MOUTH THREE TIMES A DAY    LIDOCAINE (XYLOCAINE) 5 % CREAM CREAM    Apply to area of pain once daily as needed.    METHOCARBAMOL (ROBAXIN) 500 MG TABLET    Take 1-2 tablets as needed at bedtime for muscle tightness, spasms    METOPROLOL SUCCINATE XL (TOPROL-XL) 25 MG 24 HR TABLET    Take 1 tablet (25 mg) by mouth once daily.    METOPROLOL SUCCINATE XL (TOPROL-XL) 25 MG 24 HR TABLET    TAKE 1 TABLET BY MOUTH ONCE DAILY    MUPIROCIN (BACTROBAN) 2 % OINTMENT    Apply small dab intranasally to both nostrils twice per day for 10 days    NACL-NAHCO3-HYALURON SOD-ALOE (NASOGEL) 0.9 % SPRAY GEL    USE AS DIRECTED.    PSYLLIUM HUSK, ASPARTAME, (METAMUCIL SUGAR-FREE, ASPART,) 3.4 GRAM/5.8 GRAM POWDER    Use  as directed 1-3 times daily as needed to maintain regular bowel movements.    SODIUM CHLORIDE-ALOE VERA SPRAY,NON-AEROSOL    1 spray each nostril at bedtime    SPIRONOLACTONE (ALDACTONE) 25 MG TABLET    Take 1 tablet (25 mg) by mouth once daily.    SPIRONOLACTONE (ALDACTONE) 25 MG TABLET    TAKE 1 TABLET (25 MG) BY MOUTH ONCE DAILY.    TIZANIDINE (ZANAFLEX) 4 MG TABLET    TAKE 1 TABLET EVERY 6 HOURS AS NEEDED FOR SPASM.     Allergies   Allergen Reactions    Cephalexin Unknown    Lisinopril Angioedema        Physical Exam       ED Triage Vitals [10/17/23 1758]   Temp Heart Rate Resp BP   36.7 °C (98.1 °F) 76 16 110/65      SpO2 Temp src Heart Rate Source Patient Position   96 % -- -- --      BP Location FiO2 (%)     -- --       Patient remained stable while in the emergency department. Previous outpatient and ED records were reviewed. Outside records were reviewed.  IV established and labs obtained. CBC WNL. RFP WNL. CT face with contrast was obtained.  No acute facial bone fracture. CT was an initial ED impression. Patient did not want to wait any longer for the final results.  Patient was advised that this was an ED impression and the CT read was not final and may be changed.  Patient was requesting to be discharged.  Patient received meclizine 25 mg p.o. once for dizziness.  Patient states all her symptoms resolved after the meclizine.  Patient did not have any neurological deficits to suggest a CVA.  I assumed the right ear pain may be referred dental pain.  No abscess noted.  Patient received Tylenol for pain and one dose of Augmentin 875-125 mg PO once. Patient was advised to follow up with her dentist and return to the emergency department with worsening symptoms.  Diagnostics   Labs:  Results for orders placed or performed during the hospital encounter of 10/17/23 (from the past 24 hour(s))   CBC and Auto Differential   Result Value Ref Range    WBC 7.1 4.4 - 11.3 x10*3/uL    nRBC 0.0 0.0 - 0.0 /100 WBCs     RBC 5.36 (H) 4.00 - 5.20 x10*6/uL    Hemoglobin 14.9 12.0 - 16.0 g/dL    Hematocrit 45.6 36.0 - 46.0 %    MCV 85 80 - 100 fL    MCH 27.8 26.0 - 34.0 pg    MCHC 32.7 32.0 - 36.0 g/dL    RDW 13.2 11.5 - 14.5 %    Platelets 221 150 - 450 x10*3/uL    MPV 11.3 7.5 - 11.5 fL    Neutrophils % 49.0 40.0 - 80.0 %    Immature Granulocytes %, Automated 0.1 0.0 - 0.9 %    Lymphocytes % 39.5 13.0 - 44.0 %    Monocytes % 9.3 2.0 - 10.0 %    Eosinophils % 1.7 0.0 - 6.0 %    Basophils % 0.4 0.0 - 2.0 %    Neutrophils Absolute 3.46 1.20 - 7.70 x10*3/uL    Immature Granulocytes Absolute, Automated 0.01 0.00 - 0.70 x10*3/uL    Lymphocytes Absolute 2.79 1.20 - 4.80 x10*3/uL    Monocytes Absolute 0.66 0.10 - 1.00 x10*3/uL    Eosinophils Absolute 0.12 0.00 - 0.70 x10*3/uL    Basophils Absolute 0.03 0.00 - 0.10 x10*3/uL   Renal function panel   Result Value Ref Range    Glucose 81 74 - 99 mg/dL    Sodium 142 136 - 145 mmol/L    Potassium 5.1 3.5 - 5.3 mmol/L    Chloride 105 98 - 107 mmol/L    Bicarbonate 27 21 - 32 mmol/L    Anion Gap 15 10 - 20 mmol/L    Urea Nitrogen 15 6 - 23 mg/dL    Creatinine 1.17 (H) 0.50 - 1.05 mg/dL    eGFR 51 (L) >60 mL/min/1.73m*2    Calcium 9.4 8.6 - 10.6 mg/dL    Phosphorus 4.4 2.5 - 4.9 mg/dL    Albumin 4.3 3.4 - 5.0 g/dL      Radiographs:  CT maxillofacial bones w IV contrast          CT maxillofacial bones w IV contrast    Result Date: 10/17/2023  FINDINGS: Facial Bones: [There is no displaced facial bone fracture.] Orbits: [The bony orbits are intact.] [The orbital contents are unremarkable.] Mandible/Temporomandibular Joints: [Visualized portions of mandible and bilateral temporomandibular joints are intact.] Paranasal Sinuses: [Visualized paranasal sinuses are clear.] Mastoids: [Mastoids are clear.] Soft tissues: [Unremarkable.] Limited assessment of intracranial structures demonstrates no acute intracranial process. [] IMPRESSION: [No acute facial bone fracture visualized. The visualized paranasal  sinuses and mastoids are clear.]          Assessment   In brief, Sandy Smith is a 68 y.o. female who presented to the emergency department with right ear pain and vertigo.        ED Course   Visit Vitals  /65   Pulse 76   Temp 36.7 °C (98.1 °F)   Resp 16   SpO2 96%   Smoking Status Former       Medications   acetaminophen (Tylenol) tablet 650 mg (650 mg oral Given 10/17/23 2201)   meclizine (Antivert) tablet 25 mg (25 mg oral Given 10/17/23 2201)   sodium chloride 0.9 % bolus 1,000 mL (0 mL intravenous Stopped 10/18/23 0131)   iohexol (OMNIPaque) 350 mg iodine/mL injection 80 mL (80 mL intravenous Given 10/17/23 2336)   acetaminophen (Tylenol) tablet 325 mg (325 mg oral Given 10/17/23 2349)   amoxicillin-pot clavulanate (Augmentin) 875-125 mg per tablet 875 mg (875 mg oral Given 10/18/23 0124)           Final Impression      1. Pain, dental    2. Vertigo          DISPOSITION  Disposition: Discharged home    Comment: Please note this report has been produced using speech recognition software and may contain errors related to that system including errors in grammar, punctuation, and spelling, as well as words and phrases that may be inappropriate.  If there are any questions or concerns please feel free to contact the dictating provider for clarification.    Jamee Mccarthy, APRN-CNP             Jamee Mccarthy APRN-CNP  10/18/23 7552

## 2023-10-19 ENCOUNTER — PHARMACY VISIT (OUTPATIENT)
Dept: PHARMACY | Facility: CLINIC | Age: 68
End: 2023-10-19
Payer: COMMERCIAL

## 2023-10-19 PROCEDURE — RXMED WILLOW AMBULATORY MEDICATION CHARGE

## 2023-10-19 RX ORDER — ISOSORBIDE DINITRATE 20 MG/1
20 TABLET ORAL 3 TIMES DAILY
Qty: 270 TABLET | Refills: 1 | Status: CANCELLED | OUTPATIENT
Start: 2023-10-19 | End: 2024-10-17

## 2023-10-23 ENCOUNTER — PHARMACY VISIT (OUTPATIENT)
Dept: PHARMACY | Facility: CLINIC | Age: 68
End: 2023-10-23
Payer: COMMERCIAL

## 2023-10-23 ENCOUNTER — APPOINTMENT (OUTPATIENT)
Dept: PHYSICAL THERAPY | Facility: HOSPITAL | Age: 68
End: 2023-10-23
Payer: COMMERCIAL

## 2023-10-23 PROCEDURE — RXMED WILLOW AMBULATORY MEDICATION CHARGE

## 2023-10-23 RX ORDER — CLINDAMYCIN HYDROCHLORIDE 300 MG/1
CAPSULE ORAL
Qty: 30 CAPSULE | Refills: 0 | OUTPATIENT
Start: 2023-10-23 | End: 2023-11-10 | Stop reason: SDUPTHER

## 2023-11-03 ENCOUNTER — HOSPITAL ENCOUNTER (OUTPATIENT)
Dept: RADIOLOGY | Facility: HOSPITAL | Age: 68
Discharge: HOME | End: 2023-11-03
Payer: COMMERCIAL

## 2023-11-03 DIAGNOSIS — N13.30 UNSPECIFIED HYDRONEPHROSIS: ICD-10-CM

## 2023-11-03 PROCEDURE — 74176 CT ABD & PELVIS W/O CONTRAST: CPT

## 2023-11-03 PROCEDURE — 74176 CT ABD & PELVIS W/O CONTRAST: CPT | Performed by: RADIOLOGY

## 2023-11-06 ENCOUNTER — DOCUMENTATION (OUTPATIENT)
Dept: PHYSICAL THERAPY | Facility: HOSPITAL | Age: 68
End: 2023-11-06

## 2023-11-07 NOTE — PROGRESS NOTES
Physical Therapy                 Therapy Communication Note    Patient Name: Sandy Smith  MRN: [id number]  Today's Date: 11/7/2023     Discipline: Physical Therapy    Missed Visit Reason:      Missed Time: No Show    Comment:

## 2023-11-08 ENCOUNTER — TREATMENT (OUTPATIENT)
Dept: PHYSICAL THERAPY | Facility: HOSPITAL | Age: 68
End: 2023-11-08
Payer: COMMERCIAL

## 2023-11-08 DIAGNOSIS — S83.402D SPRAIN OF COLLATERAL LIGAMENT OF LEFT KNEE, SUBSEQUENT ENCOUNTER: ICD-10-CM

## 2023-11-08 DIAGNOSIS — M25.562 LEFT KNEE PAIN: Primary | ICD-10-CM

## 2023-11-08 DIAGNOSIS — S82.142D DISPLACED BICONDYLAR FRACTURE OF LEFT TIBIA, SUBSEQUENT ENCOUNTER FOR CLOSED FRACTURE WITH ROUTINE HEALING: ICD-10-CM

## 2023-11-08 PROBLEM — S83.402A SPRAIN OF COLLATERAL LIGAMENT OF LEFT KNEE: Status: ACTIVE | Noted: 2023-11-08

## 2023-11-08 PROCEDURE — 97110 THERAPEUTIC EXERCISES: CPT | Mod: GP | Performed by: PHYSICAL THERAPIST

## 2023-11-08 PROCEDURE — 97140 MANUAL THERAPY 1/> REGIONS: CPT | Mod: GP | Performed by: PHYSICAL THERAPIST

## 2023-11-08 NOTE — PROGRESS NOTES
Physical Therapy Treatment    Patient Name: Sandy Smith  MRN: [id number]  Today's Date: 11/8/2023  Time Calculation  Start Time: 0730  Stop Time: 0810  Time Calculation (min): 40 min      Assessment:   Continues to be limited with L knee flex ROM, pain at ant knee, reviewed HEP, encouraged to continue with knee ROM, standing can do HS curl for knee ROM if limited with supine HEP.     Plan:  Continue POC for Knee ROM, STM medial knee, knee strengthening, balance control, progress to functional strengthening.    Precautions: pt has defibrillator    OP PT Plan  PT Plan: Skilled PT (visit 2)  PT Frequency: 1 time per week  Duration: 10weeks  Onset Date: 09/19/23  Certification Period Start Date: 10/16/23  Certification Period End Date: 01/14/24  Number of Treatments Authorized: MN  Rehab Potential: Good  Plan of Care Agreement: Patient    Current Problem  1. Left knee pain        2. Displaced bicondylar fracture of left tibia, subsequent encounter for closed fracture with routine healing        3. Sprain of collateral ligament of left knee, subsequent encounter            Subjective   Pt states she was not able to attend treatment recently due to family issues, still has significant pain L ant knee with amb and with knee flex, pain rated 6/10.   Doing some of her HEP (mostly squatting, not as much with knee bending).      General  Reason for Referral: closedfxLtibialplateauwithroutine healing, sprain of L knee collateral ligament,[place] Rosa lesion  Referred By: Elizabeth Abdullahi MD  Precautions  Precautions  Precautions Comment: hx of difrilator 2021      Objective   Good patellar mobility,  Pain with palpation along L medial knee, ant knee, medial gastroc and medial HS.  Knee flex AROM 110deg pain inhibition  SLS 5-15sec L side    Treatments:  Therapeutic exercises;  Recumbent stepper L2 x8min  Heel slides with slider x10, knee flex AROM in 90-90 position x10  Supine SLR x15 L, with 1# weight x15 L  Supine  bridging x15  Sidelying hip abd x15  Sit to stand no HHA 18inchair x10, 20in chair with R foot on step x10   Gastroc slant board stretch x1min  SLS 2 finger support x1min L  Wobble board double leg x1min  Double leg heel raises x10, R foot on higher step x10    Manual:  Patellar mob  STM with massage gun to medial gastroc and medial HS     Goals:   To be met at time of discharge:  -- Decrease pain to __<1/10_.  -- Independent with HEP.  -- ROM: improve L Knee flex to 130deg or better without increased pain.   -- Strength: MMT L hip and knee 4+/5 with no increased pain.    -- Functional outcome: able to SLS 10sec L side without loss of balance or increased pain for safety.  Able to resume amb on level and stairs with no deviations, reciprocal gait with pain <1/10.  Able to tolerate WB exercises in clinic x25min without rest with pain <1/10.  Improve LEFS to >68/80.

## 2023-11-10 ENCOUNTER — PHARMACY VISIT (OUTPATIENT)
Dept: PHARMACY | Facility: CLINIC | Age: 68
End: 2023-11-10
Payer: COMMERCIAL

## 2023-11-10 PROCEDURE — RXMED WILLOW AMBULATORY MEDICATION CHARGE

## 2023-11-10 RX ORDER — IBUPROFEN 800 MG/1
TABLET ORAL
Qty: 30 TABLET | Refills: 0 | OUTPATIENT
Start: 2023-11-10 | End: 2023-12-20 | Stop reason: ALTCHOICE

## 2023-11-10 RX ORDER — CLINDAMYCIN HYDROCHLORIDE 300 MG/1
CAPSULE ORAL
Qty: 30 CAPSULE | Refills: 0 | OUTPATIENT
Start: 2023-11-10 | End: 2023-12-20 | Stop reason: ALTCHOICE

## 2023-11-16 ENCOUNTER — PHARMACY VISIT (OUTPATIENT)
Dept: PHARMACY | Facility: CLINIC | Age: 68
End: 2023-11-16
Payer: COMMERCIAL

## 2023-11-16 PROCEDURE — RXMED WILLOW AMBULATORY MEDICATION CHARGE

## 2023-11-17 ENCOUNTER — OFFICE VISIT (OUTPATIENT)
Dept: UROLOGY | Facility: HOSPITAL | Age: 68
End: 2023-11-17
Payer: COMMERCIAL

## 2023-11-17 VITALS — BODY MASS INDEX: 23.18 KG/M2 | TEMPERATURE: 97.2 F | HEIGHT: 59 IN | WEIGHT: 115 LBS

## 2023-11-17 DIAGNOSIS — N13.39 OTHER HYDRONEPHROSIS: Primary | ICD-10-CM

## 2023-11-17 PROCEDURE — 99213 OFFICE O/P EST LOW 20 MIN: CPT | Performed by: NURSE PRACTITIONER

## 2023-11-17 PROCEDURE — 1126F AMNT PAIN NOTED NONE PRSNT: CPT | Performed by: NURSE PRACTITIONER

## 2023-11-17 PROCEDURE — 1036F TOBACCO NON-USER: CPT | Performed by: NURSE PRACTITIONER

## 2023-11-17 PROCEDURE — 1159F MED LIST DOCD IN RCRD: CPT | Performed by: NURSE PRACTITIONER

## 2023-11-17 ASSESSMENT — PAIN SCALES - GENERAL: PAINLEVEL: 0-NO PAIN

## 2023-11-17 NOTE — PROGRESS NOTES
[facility]  Outpatient Clinic Note      Patient: Sandy Smith  Age/Sex: 68 y.o., female  MRN: [id number]      History of Present Illness  68-year-old female presents for CT results. She has a history of HTN, pacemaker/defibrillator, and CHF. She has no bothersome urinary symptoms. She denies any dysuria, gross hematuria, recent or recurrent UTIs, flank pain, fevers or chills.     Past Medical & Surgical History  Past Medical History:   Diagnosis Date    Encounter for general adult medical examination without abnormal findings 01/31/2022    Encounter for initial preventive physical examination covered by Medicare    Otitis media, unspecified, right ear 01/31/2014    Acute right otitis media    Personal history of other diseases of the digestive system 07/21/2013    History of esophageal reflux    Personal history of other diseases of the musculoskeletal system and connective tissue 07/21/2013    History of low back pain    Personal history of other specified conditions 07/21/2013    History of angioedema      Past Surgical History:   Procedure Laterality Date    COLONOSCOPY  08/23/2013    Colonoscopy (Fiberoptic)    CT ANGIO CORONARY ART WITH HEARTFLOW IF SCORE >30%  11/20/2018    CT HEART CORONARY ANGIOGRAM 11/20/2018 [facility] [place] [place]CILLARY LEGACY    CT ANGIO NECK W  1/29/2022    CT NECK ANGIO W AND WO IV CONTRAST 1/29/2022 [facility] CLINICAL LEGACY    OTHER SURGICAL HISTORY  02/08/2021    Pacemaker insertion    OTHER SURGICAL HISTORY  11/08/2018    Umbilical hernia repair    OTHER SURGICAL HISTORY  12/05/2013    Wrist Surgery          Labs  Component      Latest Ref Rng 8/16/2023   Color, Urine      STRAW,YELLOW  YELLOW    Appearance, Urine      CLEAR  CLEAR    Specific Gravity, Urine      1.005 - 1.035  1.021    pH, Urine      5.0 - 8.0  5.0    Protein, Urine      NEGATIVE mg/dL NEGATIVE    Glucose, Urine      NEGATIVE mg/dL >=500 (3+) !    Blood, Urine      NEGATIVE  NEGATIVE    Ketones, Urine      NEGATIVE mg/dL  NEGATIVE    Bilirubin, Urine      NEGATIVE  NEGATIVE    Urobilinogen, Urine      0.0 - 1.9 mg/dL <2.0    Nitrite, Urine      NEGATIVE  NEGATIVE    Leukocyte Esterase, Urine      NEGATIVE  NEGATIVE       Legend:  ! Abnormal    Medications:  Current Outpatient Medications on File Prior to Visit   Medication Sig Dispense Refill    acetaminophen (TylenoL) 325 mg tablet Take 2 tablets (650 mg) by mouth every 6 hours if needed for mild pain (1 - 3) or fever (temp greater than 38.0 C). 30 tablet 0    alendronate (Fosamax) 70 mg tablet TAKE 1 TABLET BY MOUTH EVERY 7 DAYS. TAKE IN THE MORNING WITH A FULL GLASS OF WATER, ON AN EMPTY STOMACH, AND DO NOT TAKE ANYTHING ELSE BY MOUTH OR LIE FOR THE NEXT 30 MINUTES. 4 tablet 11    calcium carbonate (Tums) 200 mg calcium chewable tablet CHEW 1 TABLET BY MOUTH TWO TIMES A DAY 60 tablet 2    calcium carbonate 215 mg calcium (500 mg) tablet,chewable Chew 1 tablet 2 times a day. 60 tablet 2    cetirizine (ZyrTEC) 10 mg tablet TAKE 1 TABLET BY MOUTH EVERYDAY AT BEDTIME      cholecalciferol, vitamin D3, 10 mcg (400 unit) capsule TAKE 1 CAPSULE Daily      ciclopirox (Penlac) 8 % solution APPLY TO AFFECTED TOENAILS ONCE DAILY. 6.6 mL 5    clindamycin (Cleocin) 300 mg capsule take 1 capsule (300 mg) by oral route every 6 hours until gone 30 capsule 0    diclofenac sodium (Voltaren) 1 % gel gel Apply sparingly to affected areas three times a day as needed for pain      docusate sodium (Colace) 100 mg capsule TAKE 1 CAPSULE TWICE DAILY AS NEEDED.      empagliflozin (Jardiance) 10 mg TAKE 1 TABLET BY MOUTH ONCE DAILY      empagliflozin (Jardiance) 10 mg TAKE 1 TABLET BY MOUTH ONCE DAILY 90 tablet 3    fluticasone (Flonase) 50 mcg/actuation nasal spray USE 1 TO 2 SPRAYS IN EACH NOSTRIL ONCE DAILY.      hydrALAZINE (Apresoline) 10 mg tablet TAKE 1 TABLET Every 8 hours      hydrALAZINE (Apresoline) 10 mg tablet TAKE 1 TABLET BY MOUTH EVERY 8 HOURS 540 tablet 1    ibuprofen 800 mg tablet take 1  tablet (800 mg) by oral route 4 times per day with food 30 tablet 0    isosorbide dinitrate (Isordil) 20 mg tablet Take 1 tablet by mouth three times a day      isosorbide dinitrate (Isordil) 20 mg tablet TAKE 1 TABLET BY MOUTH THREE TIMES A  tablet 1    lidocaine (Xylocaine) 5 % cream cream Apply to area of pain once daily as needed.      methocarbamol (Robaxin) 500 mg tablet Take 1-2 tablets as needed at bedtime for muscle tightness, spasms      metoprolol succinate XL (Toprol-XL) 25 mg 24 hr tablet Take 1 tablet (25 mg) by mouth once daily.      metoprolol succinate XL (Toprol-XL) 25 mg 24 hr tablet TAKE 1 TABLET BY MOUTH ONCE DAILY 30 tablet 11    mupirocin (Bactroban) 2 % ointment Apply small dab intranasally to both nostrils twice per day for 10 days      NaCl-NaHCO3-hyaluron sod-aloe (NasogeL) 0.9 % spray gel USE AS DIRECTED.      psyllium husk, aspartame, (Metamucil Sugar-Free, aspart,) 3.4 gram/5.8 gram powder Use as directed 1-3 times daily as needed to maintain regular bowel movements.      sodium chloride-aloe vera spray,non-aerosol 1 spray each nostril at bedtime      spironolactone (Aldactone) 25 mg tablet Take 1 tablet (25 mg) by mouth once daily. 90 tablet 1    spironolactone (Aldactone) 25 mg tablet TAKE 1 TABLET (25 MG) BY MOUTH ONCE DAILY. 90 tablet 1    tiZANidine (Zanaflex) 4 mg tablet TAKE 1 TABLET EVERY 6 HOURS AS NEEDED FOR SPASM.      [DISCONTINUED] amiodarone (Pacerone) 200 mg tablet Take 1 tablet (200 mg) by mouth once daily. 30 tablet 2     No current facility-administered medications on file prior to visit.          Physical Exam                                                                                                                      General: Well developed, well nourished, alert and cooperative, appears in no acute distress  Eyes: Non-injected conjunctiva, sclera clear, no proptosis  Cardiac: Extremities are warm and well perfused. No edema, cyanosis or pallor.    Lungs: Breathing is easy, non-labored. Speaking in clear and complete sentences. Normal diaphragmatic movement.  MSK: Ambulatory with steady gait, unassisted  Neuro: alert and oriented to person, place and time  Psych: Demonstrates good judgement and reason, without hallucinations, abnormal affect or abnormal behaviors.  Skin: no obvious lesions, no rashes      Review of Systems  Review of Systems       Imaging  CT AP without Contrast 11/03/2023  IMPRESSION:  1. No hydroureteronephrosis.  2. Left kidney punctuate calcification, may reflect nonobstructive  nephrolith versus vascular calcification.    Lasix Scan 09/08/2023  IMPRESSION:  Normal functioning bilateral kidneys without evidence of obstruction.  Split function as described above.      Assessment & Plan  68-year-old female presents for CT results. She has a history of HTN, pacemaker/defibrillator, and CHF. She has no bothersome urinary symptoms. She denies any dysuria, gross hematuria, recent or recurrent UTIs, flank pain, fevers or chills.     We reviewed all pertinent laboratory work and imaging. Renal ultrasound from April 25, 2023 demonstrates mild L hydronephrosis which persists on postvoid imaging. There are BL punctate nephrolithiasis. She is asymptomatic.     Lasix scan with split function 50:50. CT AP without contrast from 11/03/2023 with no evidence of hydroureteronephrosis.     No further follow-up is required. Follow-up PRN.        Reviewed and approved by MADDIE CHOWDARY on 11/17/23 at 11:08 AM.

## 2023-11-28 ENCOUNTER — TREATMENT (OUTPATIENT)
Dept: PHYSICAL THERAPY | Facility: HOSPITAL | Age: 68
End: 2023-11-28
Payer: COMMERCIAL

## 2023-11-28 DIAGNOSIS — S83.402A SPRAIN OF UNSPECIFIED COLLATERAL LIGAMENT OF LEFT KNEE, INITIAL ENCOUNTER: ICD-10-CM

## 2023-11-28 DIAGNOSIS — M25.562 LEFT KNEE PAIN: ICD-10-CM

## 2023-11-28 PROCEDURE — 97110 THERAPEUTIC EXERCISES: CPT | Mod: GP,CQ

## 2023-11-28 ASSESSMENT — PAIN SCALES - GENERAL: PAINLEVEL_OUTOF10: 0 - NO PAIN

## 2023-11-28 ASSESSMENT — PAIN - FUNCTIONAL ASSESSMENT: PAIN_FUNCTIONAL_ASSESSMENT: 0-10

## 2023-11-28 NOTE — PROGRESS NOTES
Physical Therapy    Physical Therapy Treatment    Patient Name: Sandy Smith  MRN: [id number]  Today's Date: 11/28/2023  Time Calculation  Start Time: 1300  Stop Time: 1344  Time Calculation (min): 44 min      Assessment:  PT Assessment  PT Assessment Results: Decreased strength, Decreased range of motion, Decreased endurance, Decreased mobility  Rehab Prognosis: Good  Assessment Comment: Pt does well overall, slight improvement with knee flexion ROM, pt verbalized understanding of stretching to the point of discomfort in order to improve knee flexion ROM. Does well with step downs, increase step height next visit.  Plan:  OP PT Plan  PT Plan: Skilled PT (visit 2)  PT Frequency: 1 time per week  Duration: 10weeks  Onset Date: 09/19/23  Certification Period Start Date: 10/16/23  Certification Period End Date: 01/14/24  Number of Treatments Authorized: MN  Rehab Potential: Good  Plan of Care Agreement: Patient    Current Problem  1. Sprain of unspecified collateral ligament of left knee, initial encounter  Follow Up In Physical Therapy      2. Left knee pain  Follow Up In Physical Therapy        Subjective:  General  General  Reason for Referral: closed fx L tibial plateau with routine healing, sprain of L knee collateral ligament, Thao Rosa lesion  Referred By: Elizabeth Abdullahi MD  General Comment: No pain this afternoon, hurts when she goes down stairs or sitting for long periods of time. Still having a hard time with knee bending.  Precautions  Precautions  Precautions Comment: hx of difrilator 2021  Pain  Pain Assessment  Pain Assessment: 0-10  Pain Score: 0 - No pain    Objective   30 sec L LE SLS   L knee flexion 118 deg with strap    Activity Tolerance:  Activity Tolerance  Endurance: Tolerates 30+ min exercise without fatigue    Treatments:  Therapeutic exercises:  - Recumbent stepper L2 x 8 min  - Heel slides with slider x15  Supine SLR with 1# weight x15 L  - Supine bridging 2 x 10  Sidelying hip  abd with 1# weight x15  - forward step down with retro step up   - Sit to stand no HHA 18 in chair x 20, 18 in chair with R foot on step x 15  - Gastroc slant board stretch x 1 min  - SLS intermittent 2 finger support x 1 min L  - Wobble board double leg x 1 min  - Double leg heel raises x10, L SL heel raise x 10 (1 UE support)    OP EDUCATION:  Outpatient Education  Individual(s) Educated: Patient  Education Provided: Anatomy, Body Mechanics, Posture  Patient Response to Education: Patient/Caregiver Verbalized Understanding of Information    Goals:   To be met at time of discharge:  -- Decrease pain to __<1/10_.  -- Independent with HEP.  -- ROM: improve L Knee flex to 130deg or better without increased pain.   -- Strength: MMT L hip and knee 4+/5 with no increased pain.    -- Functional outcome: able to SLS 10sec L side without loss of balance or increased pain for safety.  Able to resume amb on level and stairs with no deviations, reciprocal gait with pain <1/10.  Able to tolerate WB exercises in clinic x25min without rest with pain <1/10.  Improve LEFS to >68/80.

## 2023-11-29 ENCOUNTER — OFFICE VISIT (OUTPATIENT)
Dept: PRIMARY CARE | Facility: CLINIC | Age: 68
End: 2023-11-29
Payer: COMMERCIAL

## 2023-11-29 ENCOUNTER — LAB (OUTPATIENT)
Dept: LAB | Facility: LAB | Age: 68
End: 2023-11-29
Payer: COMMERCIAL

## 2023-11-29 VITALS
OXYGEN SATURATION: 97 % | TEMPERATURE: 98.6 F | WEIGHT: 120.8 LBS | DIASTOLIC BLOOD PRESSURE: 79 MMHG | HEART RATE: 73 BPM | BODY MASS INDEX: 24.4 KG/M2 | SYSTOLIC BLOOD PRESSURE: 117 MMHG

## 2023-11-29 DIAGNOSIS — I70.8 ATHEROSCLEROSIS OF OTHER ARTERIES: ICD-10-CM

## 2023-11-29 DIAGNOSIS — M26.609 TMJ (TEMPOROMANDIBULAR JOINT DISORDER): Primary | ICD-10-CM

## 2023-11-29 DIAGNOSIS — M31.6 GIANT CELL ARTERITIS (MULTI): ICD-10-CM

## 2023-11-29 LAB
CRP SERPL HS-MCNC: <0.2 MG/L
ERYTHROCYTE [SEDIMENTATION RATE] IN BLOOD BY WESTERGREN METHOD: 15 MM/H (ref 0–30)

## 2023-11-29 PROCEDURE — 99213 OFFICE O/P EST LOW 20 MIN: CPT

## 2023-11-29 PROCEDURE — 1036F TOBACCO NON-USER: CPT

## 2023-11-29 PROCEDURE — 3074F SYST BP LT 130 MM HG: CPT

## 2023-11-29 PROCEDURE — 85652 RBC SED RATE AUTOMATED: CPT

## 2023-11-29 PROCEDURE — 1159F MED LIST DOCD IN RCRD: CPT

## 2023-11-29 PROCEDURE — 1126F AMNT PAIN NOTED NONE PRSNT: CPT

## 2023-11-29 PROCEDURE — 3078F DIAST BP <80 MM HG: CPT

## 2023-11-29 PROCEDURE — 86141 C-REACTIVE PROTEIN HS: CPT

## 2023-11-29 PROCEDURE — 36415 COLL VENOUS BLD VENIPUNCTURE: CPT

## 2023-11-29 NOTE — PROGRESS NOTES
Patient ID: Sandy Smith is a 68 y.o. female with PMH of HTN, pacemaker/defibrillator, and CHF who presents for jaw and ear pain.    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    RTC in 3 months, or earlier as needed.    Olga Newsome MD   PGY1, Fam Med      Subjective     HPI    Patient presents with complaints of jaw pain that has occurred q6mo. She states she started having left ear pain and vertigo about 3 weeks prior to today's visit, she was seen in the ED and given meclizine and augmentin x1. She reported improvement however they suggested a dental appointment to r/o referred dental pain. She was seen by her dentist and there were no concerns for dental infection. She stated she thought her pain was 2/2 her ear however it did not subside after she finished her 10 course of abx. She says the pain is located proximal to her tragus that radiates down to mandible and is a 8/10. It is exacerbated by yawning and prolonged mastication. She also noticed a pooping noise when she eats. She reports relief with tylenol. She denies blurry vision, eye pain, headache, N/V, ear pain.       Review of  Systems   Constitutional:  Negative for activity change, appetite change and unexpected weight change.   HENT:  Negative for dental problem, ear discharge, ear pain, facial swelling, sinus pressure, sore throat, trouble swallowing and voice change.    Eyes:  Negative for photophobia, pain, discharge and visual disturbance.   Respiratory:  Negative for cough, choking, chest tightness and shortness of breath.    Gastrointestinal:  Negative for abdominal distention, abdominal pain and constipation.   Musculoskeletal:  Positive for arthralgias. Negative for joint swelling and neck pain.   Neurological:  Negative for dizziness, syncope, weakness, numbness and headaches.       Past Medical History:   Diagnosis Date    Encounter for general adult medical examination without abnormal findings 01/31/2022    Encounter for initial preventive physical examination covered by Medicare    Otitis media, unspecified, right ear 01/31/2014    Acute right otitis media    Personal history of other diseases of the digestive system 07/21/2013    History of esophageal reflux    Personal history of other diseases of the musculoskeletal system and connective tissue 07/21/2013    History of low back pain    Personal history of other specified conditions 07/21/2013    History of angioedema     Past Surgical History:   Procedure Laterality Date    COLONOSCOPY  08/23/2013    Colonoscopy (Fiberoptic)    CT ANGIO CORONARY ART WITH HEARTFLOW IF SCORE >30%  11/20/2018    CT HEART CORONARY ANGIOGRAM 11/20/2018 [facility] [place]    CT ANGIO NECK  1/29/2022    CT NECK ANGIO W AND WO IV CONTRAST 1/29/2022 [facility] CLINICAL LEGACY    OTHER SURGICAL HISTORY  02/08/2021    Pacemaker insertion    OTHER SURGICAL HISTORY  11/08/2018    Umbilical hernia repair    OTHER SURGICAL HISTORY  12/05/2013    Wrist Surgery     Family History   Problem Relation Name Age of Onset    Aneurysm Mother      Heart disease Mother      Hyperlipidemia Mother      Hypertension  Mother      Heart disease Father      Hypertension Father      Other (cardiac disorder) Sister      Coronary artery disease Sister      Other (cardiac disorder) Brother      Coronary artery disease Brother       Cephalexin and Lisinopril   Social History     Tobacco Use    Smoking status: Former     Types: Cigarettes     Passive exposure: Past    Smokeless tobacco: Never   Substance Use Topics    Alcohol use: Never       Current Outpatient Medications on File Prior to Visit   Medication Sig Dispense Refill    acetaminophen (TylenoL) 325 mg tablet Take 2 tablets (650 mg) by mouth every 6 hours if needed for mild pain (1 - 3) or fever (temp greater than 38.0 C). 30 tablet 0    alendronate (Fosamax) 70 mg tablet TAKE 1 TABLET BY MOUTH EVERY 7 DAYS. TAKE IN THE MORNING WITH A FULL GLASS OF WATER, ON AN EMPTY STOMACH, AND DO NOT TAKE ANYTHING ELSE BY MOUTH OR LIE FOR THE NEXT 30 MINUTES. 4 tablet 11    calcium carbonate (Tums) 200 mg calcium chewable tablet CHEW 1 TABLET BY MOUTH TWO TIMES A DAY 60 tablet 2    calcium carbonate 215 mg calcium (500 mg) tablet,chewable Chew 1 tablet 2 times a day. 60 tablet 2    cetirizine (ZyrTEC) 10 mg tablet TAKE 1 TABLET BY MOUTH EVERYDAY AT BEDTIME      cholecalciferol, vitamin D3, 10 mcg (400 unit) capsule TAKE 1 CAPSULE Daily      ciclopirox (Penlac) 8 % solution APPLY TO AFFECTED TOENAILS ONCE DAILY. 6.6 mL 5    clindamycin (Cleocin) 300 mg capsule take 1 capsule (300 mg) by oral route every 6 hours until gone 30 capsule 0    diclofenac sodium (Voltaren) 1 % gel gel Apply sparingly to affected areas three times a day as needed for pain      docusate sodium (Colace) 100 mg capsule TAKE 1 CAPSULE TWICE DAILY AS NEEDED.      empagliflozin (Jardiance) 10 mg TAKE 1 TABLET BY MOUTH ONCE DAILY      empagliflozin (Jardiance) 10 mg TAKE 1 TABLET BY MOUTH ONCE DAILY 90 tablet 3    fluticasone (Flonase) 50 mcg/actuation nasal spray USE 1 TO 2 SPRAYS IN EACH NOSTRIL ONCE DAILY.       hydrALAZINE (Apresoline) 10 mg tablet TAKE 1 TABLET Every 8 hours      hydrALAZINE (Apresoline) 10 mg tablet TAKE 1 TABLET BY MOUTH EVERY 8 HOURS 540 tablet 1    ibuprofen 800 mg tablet take 1 tablet (800 mg) by oral route 4 times per day with food 30 tablet 0    isosorbide dinitrate (Isordil) 20 mg tablet Take 1 tablet by mouth three times a day      lidocaine (Xylocaine) 5 % cream cream Apply to area of pain once daily as needed.      methocarbamol (Robaxin) 500 mg tablet Take 1-2 tablets as needed at bedtime for muscle tightness, spasms      metoprolol succinate XL (Toprol-XL) 25 mg 24 hr tablet Take 1 tablet (25 mg) by mouth once daily.      metoprolol succinate XL (Toprol-XL) 25 mg 24 hr tablet TAKE 1 TABLET BY MOUTH ONCE DAILY 30 tablet 11    mupirocin (Bactroban) 2 % ointment Apply small dab intranasally to both nostrils twice per day for 10 days      NaCl-NaHCO3-hyaluron sod-aloe (NasogeL) 0.9 % spray gel USE AS DIRECTED.      psyllium husk, aspartame, (Metamucil Sugar-Free, aspart,) 3.4 gram/5.8 gram powder Use as directed 1-3 times daily as needed to maintain regular bowel movements.      sodium chloride-aloe vera spray,non-aerosol 1 spray each nostril at bedtime      spironolactone (Aldactone) 25 mg tablet Take 1 tablet (25 mg) by mouth once daily. 90 tablet 1    spironolactone (Aldactone) 25 mg tablet TAKE 1 TABLET (25 MG) BY MOUTH ONCE DAILY. 90 tablet 1    tiZANidine (Zanaflex) 4 mg tablet TAKE 1 TABLET EVERY 6 HOURS AS NEEDED FOR SPASM.      [DISCONTINUED] isosorbide dinitrate (Isordil) 20 mg tablet TAKE 1 TABLET BY MOUTH THREE TIMES A  tablet 1    [DISCONTINUED] amiodarone (Pacerone) 200 mg tablet Take 1 tablet (200 mg) by mouth once daily. 30 tablet 2     No current facility-administered medications on file prior to visit.        Objective   Vitals: /79 (BP Location: Right arm, Patient Position: Sitting, BP Cuff Size: Adult)   Pulse 73   Temp 37 °C (98.6 °F) (Temporal)   Wt 54.8 kg  (120 lb 12.8 oz)   SpO2 97%   BMI 24.40 kg/m²      Physical Exam  Constitutional:       General: She is not in acute distress.     Appearance: Normal appearance.   HENT:      Head: Normocephalic and atraumatic.      Jaw: Tenderness and pain on movement (decrease ROM w/ jaw deviation to left) present.      Right Ear: Tympanic membrane and ear canal normal.      Left Ear: Tympanic membrane and ear canal normal.      Nose: Nose normal.      Mouth/Throat:      Mouth: Mucous membranes are moist. No injury or lacerations.   Eyes:      General: No scleral icterus.     Extraocular Movements: Extraocular movements intact.      Conjunctiva/sclera: Conjunctivae normal.      Pupils: Pupils are equal, round, and reactive to light.   Cardiovascular:      Rate and Rhythm: Normal rate and regular rhythm.      Pulses: Normal pulses.   Pulmonary:      Effort: Pulmonary effort is normal.      Breath sounds: Normal breath sounds.   Abdominal:      General: Bowel sounds are normal. There is no distension.      Palpations: Abdomen is soft.      Tenderness: There is no abdominal tenderness.   Musculoskeletal:         General: No swelling or deformity. Normal range of motion.      Cervical back: Normal range of motion.   Skin:     General: Skin is warm.      Findings: No lesion.   Neurological:      General: No focal deficit present.      Mental Status: She is alert.      Cranial Nerves: No cranial nerve deficit.

## 2023-11-30 ENCOUNTER — PHARMACY VISIT (OUTPATIENT)
Dept: PHARMACY | Facility: CLINIC | Age: 68
End: 2023-11-30
Payer: COMMERCIAL

## 2023-11-30 ENCOUNTER — TREATMENT (OUTPATIENT)
Dept: PHYSICAL THERAPY | Facility: HOSPITAL | Age: 68
End: 2023-11-30
Payer: COMMERCIAL

## 2023-11-30 DIAGNOSIS — S83.402A SPRAIN OF UNSPECIFIED COLLATERAL LIGAMENT OF LEFT KNEE, INITIAL ENCOUNTER: Primary | ICD-10-CM

## 2023-11-30 DIAGNOSIS — M25.562 LEFT KNEE PAIN: ICD-10-CM

## 2023-11-30 PROCEDURE — RXMED WILLOW AMBULATORY MEDICATION CHARGE

## 2023-11-30 PROCEDURE — 97110 THERAPEUTIC EXERCISES: CPT | Mod: GP,CQ

## 2023-11-30 ASSESSMENT — PAIN SCALES - GENERAL: PAINLEVEL_OUTOF10: 0 - NO PAIN

## 2023-11-30 ASSESSMENT — PAIN - FUNCTIONAL ASSESSMENT: PAIN_FUNCTIONAL_ASSESSMENT: 0-10

## 2023-11-30 NOTE — PROGRESS NOTES
Physical Therapy    Physical Therapy Treatment    Patient Name: Sandy Smith  MRN: [id number]  Today's Date: 11/30/2023  Time Calculation  Start Time: 1300  Stop Time: 1345  Time Calculation (min): 45 min      Assessment:  PT Assessment  PT Assessment Results: Decreased strength, Decreased range of motion, Decreased endurance, Decreased mobility  Rehab Prognosis: Good  Assessment Comment: Pt continued to do well today, did most of the session in WB,  denying increased pain throughout. Continue functional strengthening in WB to further increase strength and endurance. Encouraged pt to continue stretching L knee to continue to improve L knee flexion ROM.  Plan:  OP PT Plan  PT Plan: Skilled PT (visit 3)  PT Frequency: 1 time per week  Duration: 10 weeks  Onset Date: 09/19/23  Certification Period Start Date: 10/16/23  Certification Period End Date: 01/14/24  Number of Treatments Authorized: MN  Rehab Potential: Good  Plan of Care Agreement: Patient    Current Problem  1. Sprain of unspecified collateral ligament of left knee, initial encounter  Follow Up In Physical Therapy      2. Left knee pain  Follow Up In Physical Therapy        Subjective:  General  PT  Visit  Response to Previous Treatment: Patient with no complaints from previous session., Partial compliance with home exercise program  General  Reason for Referral: closed fx L tibial plateau with routine healing, sprain of L knee collateral ligament, Thao Rosa lesion  Referred By: Elizabeth Abdullahi MD  General Comment: No knee pain coming in, felt good after last PT session. Going to her ballroom dancing class later tonight (about 2 hours).  Precautions  Precautions  Precautions Comment: hx of difrilator 2021  Pain  Pain Assessment  Pain Assessment: 0-10  Pain Score: 0 - No pain    Objective   Light intermittent UE support throughout standing exercises  L knee flexion 118 deg AAROM, 120 deg with PTA overpressure    Activity Tolerance:  Activity  Tolerance  Endurance: Tolerates 30+ min exercise without fatigue    Treatments:  Therapeutic exercises:  Recumbent stepper L2 x 10 min  Heel slides with slider x 15  Supine SLR with 1# weight x 15 L LE  Supine bridging 2 x 10  Sidelying hip abd with 1# weight x 15 L LE  forward step down with retro step up (L LE on step) 6'' x 15  Sit to stand no HHA 18 in chair x 20, 18 in chair with R foot on step x 15  Gastroc slant board stretch x 1 min  SLS intermittent 2 finger support x 1 min L  Wobble board double leg A/P x 2 min - no UE support after 30 sec  Double leg heel raises 2 x 10, SL heel raise x 15 R/L (1 UE support)  Side steps at railing with RTB around ankles 3 x 15 feet R/L  Toe walking at railing 2 x 15 feet  Heel walking at railing 2 x 15 feet    OP EDUCATION:  Outpatient Education  Individual(s) Educated: Patient  Education Provided: Anatomy, Body Mechanics, Posture  Patient Response to Education: Patient/Caregiver Verbalized Understanding of Information    Goals:   To be met at time of discharge:  -- Decrease pain to __<1/10_.  -- Independent with HEP.  -- ROM: improve L Knee flex to 130deg or better without increased pain.   -- Strength: MMT L hip and knee 4+/5 with no increased pain.    -- Functional outcome: able to SLS 10sec L side without loss of balance or increased pain for safety.  Able to resume amb on level and stairs with no deviations, reciprocal gait with pain <1/10.  Able to tolerate WB exercises in clinic x25min without rest with pain <1/10.  Improve LEFS to >68/80.

## 2023-12-01 DIAGNOSIS — I10 PRIMARY HYPERTENSION: Primary | ICD-10-CM

## 2023-12-05 RX ORDER — ISOSORBIDE DINITRATE 20 MG/1
20 TABLET ORAL 3 TIMES DAILY
Qty: 270 TABLET | Refills: 2 | Status: SHIPPED | OUTPATIENT
Start: 2023-12-05 | End: 2024-12-03

## 2023-12-05 ASSESSMENT — ENCOUNTER SYMPTOMS
ARTHRALGIAS: 1
SHORTNESS OF BREATH: 0
CHOKING: 0
WEAKNESS: 0
PHOTOPHOBIA: 0
ABDOMINAL PAIN: 0
UNEXPECTED WEIGHT CHANGE: 0
APPETITE CHANGE: 0
EYE PAIN: 0
SORE THROAT: 0
CHEST TIGHTNESS: 0
EYE DISCHARGE: 0
COUGH: 0
ACTIVITY CHANGE: 0
CONSTIPATION: 0
JOINT SWELLING: 0
DIZZINESS: 0
ABDOMINAL DISTENTION: 0
TROUBLE SWALLOWING: 0
SINUS PRESSURE: 0
NUMBNESS: 0
NECK PAIN: 0
VOICE CHANGE: 0
HEADACHES: 0
FACIAL SWELLING: 0

## 2023-12-06 ENCOUNTER — PHARMACY VISIT (OUTPATIENT)
Dept: PHARMACY | Facility: CLINIC | Age: 68
End: 2023-12-06
Payer: COMMERCIAL

## 2023-12-06 PROCEDURE — RXMED WILLOW AMBULATORY MEDICATION CHARGE

## 2023-12-07 NOTE — PROGRESS NOTES
I saw and evaluated the patient. I personally obtained the key and critical portions of the history and physical exam or was physically present for key and critical portions performed by the resident/fellow. I reviewed the resident/fellow's documentation and discussed the patient with the resident/fellow. I agree with the resident/fellow's medical decision making as documented in the note with the exception/addition of the following:    She had normal CRP which helps to rule out giant cell arteritis as a cause of her TMJ pain in the setting of headaches and transient bilateral visual disturbances, thought to represent migraine.   Maria Guadalupe Lemos MD

## 2023-12-08 ENCOUNTER — TREATMENT (OUTPATIENT)
Dept: PHYSICAL THERAPY | Facility: HOSPITAL | Age: 68
End: 2023-12-08
Payer: COMMERCIAL

## 2023-12-08 DIAGNOSIS — S82.142D DISPLACED BICONDYLAR FRACTURE OF LEFT TIBIA, SUBSEQUENT ENCOUNTER FOR CLOSED FRACTURE WITH ROUTINE HEALING: ICD-10-CM

## 2023-12-08 DIAGNOSIS — S83.402D SPRAIN OF COLLATERAL LIGAMENT OF LEFT KNEE, SUBSEQUENT ENCOUNTER: ICD-10-CM

## 2023-12-08 DIAGNOSIS — M25.562 LEFT KNEE PAIN, UNSPECIFIED CHRONICITY: Primary | ICD-10-CM

## 2023-12-08 PROCEDURE — 97110 THERAPEUTIC EXERCISES: CPT | Mod: GP | Performed by: PHYSICAL THERAPIST

## 2023-12-08 NOTE — PROGRESS NOTES
Physical Therapy    Physical Therapy Treatment    Patient Name: Sandy Smith  MRN: [id number]  Today's Date: 12/8/2023  Time Calculation  Start Time: 0812  Stop Time: 0852  Time Calculation (min): 40 min      Assessment:  Improvement noted with SLS, but continues with pain with knee flex and decreased functional strength L knee, palpation with overall generalized TTP, reviewed with pt HEP and adding gentle overpressure for knee flex ROM, issued size F tubigrip to work on compression support, pt verbalized understanding.       Plan:  Continue to work on gentle overpressure for knee flex ROM L knee, quad, HS and glut strengthening for functional strength improvement.     Precautions: pt has defibrillator    OP PT Plan  PT Plan: Skilled PT (visit 5)  PT Frequency: 1 time per week  Duration: 10 weeks  Onset Date: 09/19/23  Certification Period Start Date: 10/16/23  Certification Period End Date: 01/14/24  Number of Treatments Authorized: MN  Rehab Potential: Good  Plan of Care Agreement: Patient    Current Problem  No diagnosis found.    Subjective:  Pt states a little better but still has pain L ant knee along med inf knee, pain not rated but states mild, pt states she is still having trouble with knee flex.  Felt OK with treatment last time, doing some exercises at home.    General   General  Reason for Referral: closed fx L tibial plateau with routine healing, sprain of L knee collateral ligament, Thao Rosa lesion  Referred By: Elizabeth Abdullahi MD  Precautions  Precautions  Precautions Comment: hx of difrilator 2021  Pain       Objective   SLS L 7-19sec, R 5-17sec  Amb on level with no deviations, on stairs with reciprocal gait, descending with decreased control L quad due to pain, pt states pain with ascending and descending  L knee flex 110deg A, 120deg PROM with pain  Continues with generalized TTP throughout L ant knee medial and lateral     Activity Tolerance:       Treatments:  Therapeutic  exercises:  Recumbent stepper L3 x 8min  SLS with intermittent HHA x1min R/L  Double leg heel raises on stairs x15  7in step, HHA, single leg squat x15 R/L   Leg curl single leg 15# x15 R, 15# x10, 10#x5  Leg ext 1 plate double leg x15  Quadruped to child pose with attempt for knee flex x5  Heel slides with slider and strap, manual overpressure 5sec x10  SKTC with self overpressure 10sec x5   Supine bridging double leg x10, figure 4 x10 R/L, bridging with clamshell x10  Issued size F tubigrip to wear, add self SKTC, knee flex with overpressure with goal of improved knee flex ROM, rationale and expectations reviewed with pt.     OP EDUCATION:       Goals:   To be met at time of discharge:  -- Decrease pain to __<1/10_.  -- Independent with HEP.  -- ROM: improve L Knee flex to 130deg or better without increased pain.   -- Strength: MMT L hip and knee 4+/5 with no increased pain.    -- Functional outcome: able to SLS 10sec L side without loss of balance or increased pain for safety.  Able to resume amb on level and stairs with no deviations, reciprocal gait with pain <1/10.  Able to tolerate WB exercises in clinic x25min without rest with pain <1/10.  Improve LEFS to >68/80.

## 2023-12-11 ENCOUNTER — TREATMENT (OUTPATIENT)
Dept: PHYSICAL THERAPY | Facility: HOSPITAL | Age: 68
End: 2023-12-11
Payer: COMMERCIAL

## 2023-12-11 DIAGNOSIS — S82.142D DISPLACED BICONDYLAR FRACTURE OF LEFT TIBIA, SUBSEQUENT ENCOUNTER FOR CLOSED FRACTURE WITH ROUTINE HEALING: ICD-10-CM

## 2023-12-11 DIAGNOSIS — M25.562 LEFT KNEE PAIN: Primary | ICD-10-CM

## 2023-12-11 DIAGNOSIS — S83.402A SPRAIN OF UNSPECIFIED COLLATERAL LIGAMENT OF LEFT KNEE, INITIAL ENCOUNTER: ICD-10-CM

## 2023-12-11 PROCEDURE — 97110 THERAPEUTIC EXERCISES: CPT | Mod: GP,CQ

## 2023-12-11 ASSESSMENT — PAIN - FUNCTIONAL ASSESSMENT: PAIN_FUNCTIONAL_ASSESSMENT: 0-10

## 2023-12-11 ASSESSMENT — PAIN SCALES - GENERAL: PAINLEVEL_OUTOF10: 0 - NO PAIN

## 2023-12-11 NOTE — PROGRESS NOTES
"Physical Therapy    Physical Therapy Treatment    Patient Name: Sandy Smith  MRN: [id number]  Today's Date: 12/11/2023  Time Calculation  Start Time: 1000  Stop Time: 1041  Time Calculation (min): 41 min      Assessment:  PT Assessment  PT Assessment Results: Decreased strength, Decreased range of motion, Decreased endurance, Decreased mobility  Rehab Prognosis: Good  Assessment Comment: Pt does well, no reports of increased s/s. Continues to be challenged with end range knee flexion however achieved 121 degrees with strap assist. Increased repetitons with mutiple exercises to improve LE endurance with good pt response. Min verbal cues throughout for proper technique.  Plan:  OP PT Plan  PT Plan: Skilled PT (visit 6)  PT Frequency: 1 time per week  Duration: 10 weeks  Onset Date: 09/19/23  Certification Period Start Date: 10/16/23  Certification Period End Date: 01/14/24  Number of Treatments Authorized: MN  Rehab Potential: Good  Plan of Care Agreement: Patient    Current Problem  1. Left knee pain        2. Displaced bicondylar fracture of left tibia, subsequent encounter for closed fracture with routine healing        3. Sprain of unspecified collateral ligament of left knee, initial encounter          Subjective:  General  PT  Visit  Response to Previous Treatment: Patient with no complaints from previous session., Compliant with home exercise program  General  Reason for Referral: closed fx L tibial plateau with routine healing, sprain of L knee collateral ligament, Thao Rosa lesion  Referred By: Elizabeth Abdullahi MD  General Comment: Felt good after last visit. Did her usual ballroom dancing clasdses this weekend, feeling sore today could be from the class \"we were on our toes a lot\". 0/10 L knee pain today- just sore.  Precautions  Precautions  Precautions Comment: hx of difrilator 2021  Pain  Pain Assessment  Pain Assessment: 0-10  Pain Score: 0 - No pain (sore)    Objective   L knee flexion with " strap 121 degrees     Activity Tolerance:  Activity Tolerance  Endurance: Tolerates 30+ min exercise without fatigue    Treatments:  Therapeutic exercises:  Recumbent stepper L3 x 8min  SLS with intermittent HHA x 1 min R/L  Double leg heel raises on stairs 2 x 10  7in step, HHA, single leg squat x15 R/L   Leg curl single leg 15# x 15 R LE, 10# x 15 L LE  Leg ext 1 plate double leg 2 x 10   Quadruped to child pose with attempt for knee flex 5 sec x 5  Heel slides with slider and strap 5sec x 10 - measurement taken  SKTC with self overpressure 10 sec x 5   Supine bridging double leg x 15, figure 4 x 15 R/L, bridging with clamshell x10    OP EDUCATION:  Outpatient Education  Individual(s) Educated: Patient  Education Provided: Anatomy, Body Mechanics, Posture  Patient Response to Education: Patient/Caregiver Verbalized Understanding of Information    Goals:   To be met at time of discharge:  -- Decrease pain to __<1/10_.  -- Independent with HEP.  -- ROM: improve L Knee flex to 130deg or better without increased pain.   -- Strength: MMT L hip and knee 4+/5 with no increased pain.    -- Functional outcome: able to SLS 10sec L side without loss of balance or increased pain for safety.  Able to resume amb on level and stairs with no deviations, reciprocal gait with pain <1/10.  Able to tolerate WB exercises in clinic x25min without rest with pain <1/10.  Improve LEFS to >68/80.

## 2023-12-12 ENCOUNTER — APPOINTMENT (OUTPATIENT)
Dept: PHYSICAL THERAPY | Facility: HOSPITAL | Age: 68
End: 2023-12-12
Payer: COMMERCIAL

## 2023-12-15 ENCOUNTER — APPOINTMENT (OUTPATIENT)
Dept: PHYSICAL THERAPY | Facility: HOSPITAL | Age: 68
End: 2023-12-15
Payer: COMMERCIAL

## 2023-12-19 ENCOUNTER — TREATMENT (OUTPATIENT)
Dept: PHYSICAL THERAPY | Facility: HOSPITAL | Age: 68
End: 2023-12-19
Payer: COMMERCIAL

## 2023-12-19 DIAGNOSIS — S83.402D SPRAIN OF COLLATERAL LIGAMENT OF LEFT KNEE, SUBSEQUENT ENCOUNTER: ICD-10-CM

## 2023-12-19 DIAGNOSIS — S82.142D DISPLACED BICONDYLAR FRACTURE OF LEFT TIBIA, SUBSEQUENT ENCOUNTER FOR CLOSED FRACTURE WITH ROUTINE HEALING: ICD-10-CM

## 2023-12-19 DIAGNOSIS — M25.562 LEFT KNEE PAIN, UNSPECIFIED CHRONICITY: Primary | ICD-10-CM

## 2023-12-19 PROCEDURE — 97110 THERAPEUTIC EXERCISES: CPT | Mod: GP | Performed by: PHYSICAL THERAPIST

## 2023-12-19 NOTE — PROGRESS NOTES
Physical Therapy    Physical Therapy Treatment    Patient Name: Sandy Smith  MRN: [id number]  Today's Date: 2023         Assessment:   Improvement noted with SLS and walking, but continues with pain with knee flex (PROM more than AROM, limited by pain), decreased functional strength L knee, palpation with overall generalized TTP, reviewed with pt HEP and continue to add gentle overpressure for knee flex ROM and single leg step down on stairs,  pt verbalized understanding.       Plan:  Continue to work on overpressure for knee flex ROM L knee, step down for functional strengthening, check goals.      Precautions: pt has defibrillator    OP PT Plan  PT Plan: Skilled PT (visit 7)  PT Frequency: 1 time per week  Duration: 10 weeks  Onset Date: 23  Certification Period Start Date: 10/16/23  Certification Period End Date: 24  Number of Treatments Authorized: MN  Rehab Potential: Good  Plan of Care Agreement: Patient    Current Problem  1. Left knee pain, unspecified chronicity        2. Sprain of collateral ligament of left knee, subsequent encounter        3. Displaced bicondylar fracture of left tibia, subsequent encounter for closed fracture with routine healing            Subjective:  Pt states walking is a little better, but still has pain with going up and down stairs and with bending, the tubigrip feels good, still wearing it, still feels some swelling along R medial inf knee, pain rated 7/10 today  Brother passed so she was busy with  arrangement.    General    General  Reason for Referral: closed fx L tibial plateau with routine healing, sprain of L knee collateral ligament, Thao Rosa lesion  Referred By: Elizabeth Abdullahi MD  Precautions  Precautions  Precautions Comment: hx of difrilator   Pain       Objective   MMT 4/5 L knee flex and ext, pain with ant and post knee with knee flex MMT   Continues with pain with palpation throughout L ant and medial, lateral knee, pes  anserine region  L knee flex AROM 110-114deg, AAROM 120-124deg with pain     Activity Tolerance:       Treatments:  Therapeutic exercises:  Recumbent stepper L3 x 8min  Forward lean onto chair for knee flex ROM stretch 10sec x5  SLS with intermittent HHA x 1 min R/L  6in step, HHA, forward step down to retro step up x10 R/ L   Leg curl single leg 10# x 15 R/L, 25# x15 B   Leg ext 1 plate x10 R/L, 2 plates x10 B   SKTC with self overpressure 10 sec x 3  Toe walk x20 feet with no pain  Supine bridging  figure 4 x 10 R/L,   HEP: knee flex with overpressure (SKTC and with chair rocking) and forward and lateral step downs.    OP EDUCATION:       Goals:   To be met at time of discharge:  -- Decrease pain to __<1/10_.  -- Independent with HEP.  -- ROM: improve L Knee flex to 130deg or better without increased pain.   -- Strength: MMT L hip and knee 4+/5 with no increased pain.    -- Functional outcome: able to SLS 10sec L side without loss of balance or increased pain for safety.  Able to resume amb on level and stairs with no deviations, reciprocal gait with pain <1/10.  Able to tolerate WB exercises in clinic x25min without rest with pain <1/10.  Improve LEFS to >68/80.

## 2023-12-20 ENCOUNTER — OFFICE VISIT (OUTPATIENT)
Dept: CARDIOLOGY | Facility: CLINIC | Age: 68
End: 2023-12-20
Payer: COMMERCIAL

## 2023-12-20 ENCOUNTER — OFFICE VISIT (OUTPATIENT)
Dept: OTOLARYNGOLOGY | Facility: HOSPITAL | Age: 68
End: 2023-12-20
Payer: COMMERCIAL

## 2023-12-20 ENCOUNTER — PHARMACY VISIT (OUTPATIENT)
Dept: PHARMACY | Facility: CLINIC | Age: 68
End: 2023-12-20
Payer: COMMERCIAL

## 2023-12-20 ENCOUNTER — HOSPITAL ENCOUNTER (OUTPATIENT)
Dept: CARDIOLOGY | Facility: CLINIC | Age: 68
Discharge: HOME | End: 2023-12-20
Payer: COMMERCIAL

## 2023-12-20 VITALS — HEIGHT: 59 IN | WEIGHT: 121.4 LBS | BODY MASS INDEX: 24.48 KG/M2 | TEMPERATURE: 97 F

## 2023-12-20 VITALS
HEART RATE: 70 BPM | SYSTOLIC BLOOD PRESSURE: 107 MMHG | DIASTOLIC BLOOD PRESSURE: 70 MMHG | WEIGHT: 122.3 LBS | HEIGHT: 59 IN | BODY MASS INDEX: 24.65 KG/M2 | OXYGEN SATURATION: 95 %

## 2023-12-20 DIAGNOSIS — M26.69 TMJ INFLAMMATION: Primary | ICD-10-CM

## 2023-12-20 DIAGNOSIS — I42.9 CARDIOMYOPATHY, UNSPECIFIED TYPE (MULTI): ICD-10-CM

## 2023-12-20 DIAGNOSIS — I42.9 CARDIOMYOPATHY, UNSPECIFIED TYPE (MULTI): Primary | ICD-10-CM

## 2023-12-20 PROCEDURE — 1159F MED LIST DOCD IN RCRD: CPT | Performed by: NURSE PRACTITIONER

## 2023-12-20 PROCEDURE — 1160F RVW MEDS BY RX/DR IN RCRD: CPT | Performed by: INTERNAL MEDICINE

## 2023-12-20 PROCEDURE — 99214 OFFICE O/P EST MOD 30 MIN: CPT | Performed by: INTERNAL MEDICINE

## 2023-12-20 PROCEDURE — 1159F MED LIST DOCD IN RCRD: CPT | Performed by: INTERNAL MEDICINE

## 2023-12-20 PROCEDURE — 93010 ELECTROCARDIOGRAM REPORT: CPT | Performed by: INTERNAL MEDICINE

## 2023-12-20 PROCEDURE — 3074F SYST BP LT 130 MM HG: CPT | Performed by: INTERNAL MEDICINE

## 2023-12-20 PROCEDURE — 1126F AMNT PAIN NOTED NONE PRSNT: CPT | Performed by: INTERNAL MEDICINE

## 2023-12-20 PROCEDURE — 99213 OFFICE O/P EST LOW 20 MIN: CPT | Mod: ZK,25 | Performed by: NURSE PRACTITIONER

## 2023-12-20 PROCEDURE — 93290 INTERROG DEV EVAL ICPMS IP: CPT | Performed by: INTERNAL MEDICINE

## 2023-12-20 PROCEDURE — 1126F AMNT PAIN NOTED NONE PRSNT: CPT | Performed by: NURSE PRACTITIONER

## 2023-12-20 PROCEDURE — 93283 PRGRMG EVAL IMPLANTABLE DFB: CPT

## 2023-12-20 PROCEDURE — 1036F TOBACCO NON-USER: CPT | Performed by: INTERNAL MEDICINE

## 2023-12-20 PROCEDURE — 99213 OFFICE O/P EST LOW 20 MIN: CPT | Performed by: NURSE PRACTITIONER

## 2023-12-20 PROCEDURE — RXMED WILLOW AMBULATORY MEDICATION CHARGE

## 2023-12-20 PROCEDURE — 1160F RVW MEDS BY RX/DR IN RCRD: CPT | Performed by: NURSE PRACTITIONER

## 2023-12-20 PROCEDURE — 1036F TOBACCO NON-USER: CPT | Performed by: NURSE PRACTITIONER

## 2023-12-20 PROCEDURE — 3078F DIAST BP <80 MM HG: CPT | Performed by: INTERNAL MEDICINE

## 2023-12-20 PROCEDURE — 93005 ELECTROCARDIOGRAM TRACING: CPT | Performed by: INTERNAL MEDICINE

## 2023-12-20 PROCEDURE — 93283 PRGRMG EVAL IMPLANTABLE DFB: CPT | Performed by: INTERNAL MEDICINE

## 2023-12-20 RX ORDER — FAMOTIDINE 20 MG/1
20 TABLET, FILM COATED ORAL DAILY
Qty: 30 TABLET | Refills: 0 | Status: SHIPPED | OUTPATIENT
Start: 2023-12-20 | End: 2024-01-12 | Stop reason: SDUPTHER

## 2023-12-20 RX ORDER — IBUPROFEN 600 MG/1
600 TABLET ORAL EVERY 8 HOURS
Qty: 30 TABLET | Refills: 0 | Status: SHIPPED | OUTPATIENT
Start: 2023-12-20 | End: 2023-12-30

## 2023-12-20 ASSESSMENT — ENCOUNTER SYMPTOMS
LOSS OF SENSATION IN FEET: 0
OCCASIONAL FEELINGS OF UNSTEADINESS: 0
DEPRESSION: 0
DEPRESSION: 0
LOSS OF SENSATION IN FEET: 0
OCCASIONAL FEELINGS OF UNSTEADINESS: 0

## 2023-12-20 ASSESSMENT — COLUMBIA-SUICIDE SEVERITY RATING SCALE - C-SSRS
6. HAVE YOU EVER DONE ANYTHING, STARTED TO DO ANYTHING, OR PREPARED TO DO ANYTHING TO END YOUR LIFE?: NO
1. IN THE PAST MONTH, HAVE YOU WISHED YOU WERE DEAD OR WISHED YOU COULD GO TO SLEEP AND NOT WAKE UP?: NO
2. HAVE YOU ACTUALLY HAD ANY THOUGHTS OF KILLING YOURSELF?: NO

## 2023-12-20 ASSESSMENT — PAIN SCALES - GENERAL: PAINLEVEL: 0-NO PAIN

## 2023-12-20 ASSESSMENT — PATIENT HEALTH QUESTIONNAIRE - PHQ9
1. LITTLE INTEREST OR PLEASURE IN DOING THINGS: NOT AT ALL
2. FEELING DOWN, DEPRESSED OR HOPELESS: NOT AT ALL
SUM OF ALL RESPONSES TO PHQ9 QUESTIONS 1 & 2: 0

## 2023-12-20 NOTE — PATIENT INSTRUCTIONS
It was nice to see you today!  We reviewed that off of the amiodarone we do not see any recurrence of the PVC or ventricular rhythms.    You can remain off of the amiodarone at this time.  Please re-establish remote connection for your ICD with the plan outlined by Marge today.  You can call device clinic at [phone] with any questions. We will plan to take a reading in 3 months.     Please return to clinic in 12 months.

## 2023-12-20 NOTE — PATIENT INSTRUCTIONS
Felix Lunsford to the clinic of Iris Beltran CNP. We are here to assist you through your ENT care at [facility].    My office number is [phone]. This number is the most direct way to communicate with the office. Ev is my  and she answers the office phone from 8am-4pm Mon-Fri. She can help you with many general questions and information. Questions that she cannot answer will be directed appropriately to me. You may need to leave a message. In this case, someone from the team will call you back.    Sometimes other team members will also be involved in your care. This may include dieticians, social workers, speech therapists, medical oncologist or radiation oncologists. I will provide these referrals as needed. Please let me know if you would like to request a specific referral.    I look forward to working with you to meet your healthcare goals.     Recommendations:  -600mg Ibuprofen 3 times per with meals for 10 days  -Famotidine 20mg at bedtime while taking Ibuprofen  -Warm compresses 3 times per day  -Massage Jaw 3 times per day  -Soft, no chew diet  -Limit talking  -Counter  until able to see dentist  -Consider seeing dentist to check jaw alignment/custom nightguard  -Follow up in 3 months or sooner if needed

## 2023-12-20 NOTE — PROGRESS NOTES
12/20/23  Sandy Smith is a 68 y.o. year old female patient with soreness in jaw referred for  evaluation and treatment.      1955    HPI:  Luis Yeung is being seen today for symptoms consistent to her previous diagnosis of TMJ inflammation. Over 18 months ago, she was seen and treated for TMJ. She was educated on how to minimize future flares. She states about 3-4 weeks ago she found that she was clenching her teeth a lot more. She attributes this to the stress from the recent death of her brother and having to deal with family now wanting to be in the picture. She had also taken up the habit of chewing bubble gum. Her diet had become more en dated with foods requiring lots of chewing. She know that all of these  things are most likely causing the pain in her jaw, but would like to be evaluated and discuss further treatment options.      ROS:  Unless mentioned in the HPI, all other systems have been reviewed and ar negative.      Constitutional:   General appearance: Healthy appearing, well-nourished, well groomed.  No acute distress.  Communication: Normal communication without aids or , normal voice quality.  No hoarseness, stridor or stertor.    Psychiatric: Oriented to person, place and time.  Normal mood and affect.    Neurologic: Cranial nerves II-XII grossly intact and symmetric bilaterally; CN VII palsy on the left side     Head and Face:  Head: Atraumatic with no masses, lesions or scarring.  Face: Normal symmetry, no paralysis, synkinesis or facial tic.  No scars or deformities.  Sinuses: Palpation of the face revealed no sinus tenderness  Salivary glands: Bilateral tenderness of the parotid gland, no parotid masses.  No tenderness of the submandibular glands, no submandibular gland masses.  TMJ: movement at joint, positive for clicking or pain with palpation.    Eyes: EOMI, PERRL, conjunctiva not edematous or erythematous    Ears: External inspection of ears with no deformity, scars or  masses.  Otoscopic examination: Auditory canals with normal appearance, no cerumen obstruction, erythema or edema.  Tympanic membranes intact without middle ear effusion.  Assessment of hearing with normal clinical speech reception to voice.      Nose: External inspection of nose: No nasal lesions, lacerations or scars.  Septum midline.  No inferior turbinate hypertrophy.  Patent with good air entry bilaterally.    Oral Cavity/Mouth: No masses, lesions or ulcers along the lips, gingival or buccal mucosa.  Floor of the mouth is soft without edema or masses.  Teeth in fair condition.  No masses, lesions or ulcers along the tongue.  Oral cavity and oropharynx mucosa moist.  Hard and soft palate intact and symmetric with no masses or lesions.  Posterior oropharynx patent.  Palate, posterior pharyngeal walls and tonsils normal, symmetric with no masses, lesions or ulcers.      Neck: Normal appearing, symmetric, trachea midline.  No crepitus.  Thyroid: Symmetrical, no enlargement, no tenderness, no nodules.    Lymphatic: No palpable cervical lymphadenopathy, no submandibular lymphadenopathy, no supraclavicular lymphadenopathy.    Cardiovascular: Examination of peripheral vascular system shows no clubbing or cyanosis.    Respiratory: No respiratory distress increased work of breathing.  Inspection of the chest with symmetric chest expansion and normal respiratory effort.    Skin: No rashes in the head or neck        Assessment:   68 year old female with a known history of TMJ inflammation seen today for increase tenderness in her jaw. Patient on exam with symptoms consistent with a new TMJ flare.    Recommendations:  -600mg Ibuprofen 3 times per with meals for 10 days  -Famotidine 20mg at bedtime while taking Ibuprofen  -Warm compresses 3 times per day  -Massage Jaw 3 times per day  -Soft, no chew diet  -Limit talking  -Counter  until able to see dentist  -Consider seeing dentist to check jaw alignment/custom  nightguard  -Follow up in 3 months or sooner if needed    All questions answered to the patient's satisfaction.    Iris Beltran, APRN-CNP   Certified Family Nurse Practitioner  Nurse Practitioner III

## 2023-12-20 NOTE — PROGRESS NOTES
"Returns for follow up visit for    Chief Complaint   Patient presents with    Follow-up     Patient denies lightheadedness, syncope, dyspnea, orthopnea and chest pain/discomfort.      ventricular tachycardia     Patient returns to clinic for follow up voicing no complaints. She denies palpitations, lightheadedness, syncope, dyspnea, orthopnea and chest pain/discomfort.  RAINA Ibanez RN         History Of Present Illness:    Sandy Smith is a 68 y.o. year old female patient   She has a diagnosis of NICM at least since 2018. At that time her EF was 35-40%. She was referred for evaluation of frequent PVC,s and nonsustained VT. 10/21/20. She was then admitted with hypertension and \"bradycardia\" which was due to frequent PVC's. Her echo 2020 showed EF 20% and cath showed no obstructive CAD. After discharge she wore a monitor which showed PVC burden of 39%. she has multiple morphologies of PVC and non sustained VT up to 44 beats at very rapid rates and likely not monomorphic.  She underwent RFA for PVC and initiation of amiodarone. 10/2020. Recent holter showed PVC burden now 0.7%, She wore the lifevest but developed rib soreness and after recent echo showed some improvement in her EF 25-30% we discontinued it.     She then underwent implantation of an ICD due to continue low EF.   On amiodarone we did not see any recurrence and recently EF was noted to be recovered. The amiodarone was discontinued for her to be able to take treatment for hepatitis C.  Off of amiodarone she has not had any recurrent arrhythmias. Today she presents for follow up.    Her only symptom at this time is of left breat heaviness which she feels is related to weight loss and the ICD.       Past Medical History:  Past Medical History:   Diagnosis Date    Encounter for general adult medical examination without abnormal findings 01/31/2022    Encounter for initial preventive physical examination covered by Medicare    Otitis media, unspecified, " right ear 01/31/2014    Acute right otitis media    Personal history of other diseases of the digestive system 07/21/2013    History of esophageal reflux    Personal history of other diseases of the musculoskeletal system and connective tissue 07/21/2013    History of low back pain    Personal history of other specified conditions 07/21/2013    History of angioedema       Past Surgical History:  Past Surgical History:   Procedure Laterality Date    COLONOSCOPY  08/23/2013    Colonoscopy (Fiberoptic)    CT ANGIO CORONARY ART WITH HEARTFLOW IF SCORE >30%  11/20/2018    CT HEART CORONARY ANGIOGRAM 11/20/2018 [facility] ANCILLARY LEGACY    CT ANGIO NECK  1/29/2022    CT NECK ANGIO W AND WO IV CONTRAST 1/29/2022 [facility] CLINICAL LEGACY    OTHER SURGICAL HISTORY  02/08/2021    Pacemaker insertion    OTHER SURGICAL HISTORY  11/08/2018    Umbilical hernia repair    OTHER SURGICAL HISTORY  12/05/2013    Wrist Surgery          Social History:  Caffeine: 1 cup tea 3-4 days/week; chocolate   Cigarettes: none  ETOH: occ - 1 - 1 1/2 drinks/month  Drugs: none  Exercise: subcontractor - cleaning/moving     Occ: active job  Marital status:  SO    Family History:  Family History   Problem Relation Name Age of Onset    Aneurysm Mother      Heart disease Mother      Hyperlipidemia Mother      Hypertension Mother      Heart disease Father      Hypertension Father      Other (cardiac disorder) Sister      Coronary artery disease Sister      Other (cardiac disorder) Brother      Coronary artery disease Brother           Allergies:  Allergies   Allergen Reactions    Cephalexin Unknown    Lisinopril Angioedema        Outpatient Medications:  Current Outpatient Medications   Medication Instructions    acetaminophen (TYLENOL) 650 mg, oral, Every 6 hours PRN    alendronate (Fosamax) 70 mg tablet TAKE 1 TABLET BY MOUTH EVERY 7 DAYS. TAKE IN THE MORNING WITH A FULL GLASS OF WATER, ON AN EMPTY STOMACH, AND DO NOT TAKE ANYTHING ELSE BY MOUTH OR LIE FOR THE  NEXT 30 MINUTES.    calcium carbonate 215 mg calcium (500 mg) tablet,chewable 1 tablet, oral, 2 times daily    cetirizine (ZyrTEC) 10 mg tablet Take 1 tablet (10 mg) by mouth once daily as needed.    cholecalciferol, vitamin D3, 10 mcg (400 unit) capsule TAKE 1 CAPSULE Daily    ciclopirox (Penlac) 8 % solution APPLY TO AFFECTED TOENAILS ONCE DAILY.    diclofenac sodium (Voltaren) 1 % gel gel Apply sparingly to affected areas three times a day as needed for pain    empagliflozin (Jardiance) 10 mg TAKE 1 TABLET BY MOUTH ONCE DAILY    famotidine (PEPCID) 20 mg, oral, Daily    fluticasone (Flonase) 50 mcg/actuation nasal spray USE 1 TO 2 SPRAYS IN EACH NOSTRIL ONCE DAILY.    hydrALAZINE (Apresoline) 10 mg tablet TAKE 1 TABLET BY MOUTH EVERY 8 HOURS    ibuprofen 600 mg, oral, Every 8 hours    isosorbide dinitrate (Isordil) 20 mg tablet TAKE 1 TABLET BY MOUTH THREE TIMES A DAY    lidocaine (Xylocaine) 5 % cream cream Apply to area of pain once daily as needed.    methocarbamol (Robaxin) 500 mg tablet Take 1-2 tablets as needed at bedtime for muscle tightness, spasms    metoprolol succinate XL (TOPROL-XL) 25 mg, oral, Daily    NaCl-NaHCO3-hyaluron sod-aloe (NasogeL) 0.9 % spray gel USE AS DIRECTED.    psyllium husk, aspartame, (Metamucil Sugar-Free, aspart,) 3.4 gram/5.8 gram powder Use as directed 1-3 times daily as needed to maintain regular bowel movements.    sodium chloride-aloe vera spray,non-aerosol 1 spray each nostril at bedtime    spironolactone (Aldactone) 25 mg tablet TAKE 1 TABLET (25 MG) BY MOUTH ONCE DAILY.         Last Recorded Vitals:      10/2/2023    11:51 AM 10/17/2023     5:58 PM 10/18/2023     2:11 AM 11/17/2023    11:08 AM 11/29/2023     3:07 PM 12/20/2023    12:08 PM 12/20/2023     3:48 PM   Vitals   Systolic 131 110 146  117  107   Diastolic 80 65 96  79  70   Heart Rate 70 76 60  73  70   Temp 36.7 °C (98 °F) 36.7 °C (98.1 °F) 36.3 °C (97.4 °F) 36.2 °C (97.2 °F) 37 °C (98.6 °F) 36.1 °C (97 °F)   "  Resp 16 16 16       Height (in) 1.499 m (4' 11\")   1.499 m (4' 11\")  1.499 m (4' 11\") 1.499 m (4' 11\")   Weight (lb) 115   115 120.8 121.4 122.3   BMI 23.23 kg/m2   23.23 kg/m2 24.4 kg/m2 24.52 kg/m2 24.7 kg/m2   BSA (m2) 1.47 m2   1.47 m2 1.51 m2 1.51 m2 1.52 m2   Visit Report    Report Report Report    Report Report    Report        Physical Exam:  Physical Exam  Constitutional:       Appearance: Normal appearance.   HENT:      Head: Normocephalic.      Nose: Nose normal.   Neck:      Vascular: No carotid bruit.   Cardiovascular:      Rate and Rhythm: Normal rate and regular rhythm.      Heart sounds: Normal heart sounds. No murmur heard.     No friction rub. No gallop.   Pulmonary:      Breath sounds: Normal breath sounds.   Chest:          Comments: Incision well healed Non tender    Musculoskeletal:         General: Normal range of motion.      Right lower leg: No edema.      Left lower leg: No edema.   Skin:     General: Skin is warm and dry.   Neurological:      General: No focal deficit present.      Mental Status: She is alert and oriented to person, place, and time.   Psychiatric:         Mood and Affect: Mood normal.         Behavior: Behavior normal.          Last Cardiology Tests:  ECG:  ECG: NSR70 bpm otherwise normal   Echo:    [facility], [address] [place] [address]  Tel [phone] and Fax [phone]     TRANSTHORACIC ECHOCARDIOGRAM REPORT        Patient Name:     MARY De La Vega Physician:  [id number] Elba CHAVES  Study Date:       8/16/2023       Referring           YEISON MCCALLUM  Physician:  MRN/PID:          [id number]        PCP:  Accession/Order#: [id number]    [facility]ly [facility] Non  Location:           Invasive  YOB: 1955        Fellow:             [id number] Apryl Leroy MD  Gender:           F               Nurse:  Admit Date:                       Sonographer:        Verna SHORE  Admission Status: " Outpatient      Additional Staff:  Height:           152.40 cm       CC Report to:  Weight:           50.35 kg        Study Type:         Echocardiogram  BSA:              1.45 m2  Blood Pressure: 118 /75 mmHg     Diagnosis/ICD: I50.20-Unspecified systolic (congestive) heart failure (CHF);  I42.9-Cardiomyopathy, unspecified; I47.29-Ventricular  tachycardia, other  Indication:    CM, CHF, VT  Procedure/CPT: Echo Complete w Full Doppler-[id number]     Patient History:  Pertinent History: N-O CAD, NICM, HFrEF, Nonsustained VT s/p ICD, HTN, HLD,  Family hx CAD/cardiac disorder.     Study Detail: The following Echo studies were performed: 2D, M-Mode, Doppler and  color flow.        PHYSICIAN INTERPRETATION:  Left Ventricle: The left ventricular systolic function is low normal, with an estimated ejection fraction of 50-55%. Estimated left ventricular mass is normal. There are no regional wall motion abnormalities. The left ventricular cavity size is normal. Abnormal (paradoxical) septal motion, consistent with RV pacemaker. Spectral Doppler shows an impaired relaxation pattern of left ventricular diastolic filling.  Left Atrium: The left atrium is normal in size.  Right Ventricle: The right ventricle is normal in size. There is reduced right ventricular systolic function. A device is visualized in the right ventricle.  Right Atrium: The right atrium is normal in size. There is a device visualized in the right atrium.  Aortic Valve: The aortic valve is trileaflet. There is no evidence of aortic valve regurgitation. The peak instantaneous gradient of the aortic valve is 4.4 mmHg.  Mitral Valve: The mitral valve is normal in structure. There is moderate mitral annular calcification. There is trace mitral valve regurgitation.  Tricuspid Valve: The tricuspid valve is structurally normal. There is trace tricuspid regurgitation. The Doppler estimated RVSP is within normal limits at 19.1 mmHg. RVSP may be underestimated due to  incomplete TR CW Doppler envelope.  Pulmonic Valve: The pulmonic valve is not well visualized. There is trace pulmonic valve regurgitation.  Pericardium: There is a small pericardial effusion.  Aorta: The aortic root is normal. There is no dilatation of the aortic root.  Systemic Veins: The inferior vena cava was not well visualized.  In comparison to the previous echocardiogram(s): Compared with study from 12/16/2020,. Compared to previous echo on 12/18/2020, the ejection fraction had recovered from 25-30% to 50-55% on this study.        CONCLUSIONS:  1. Left ventricular systolic function is low normal with a 50-55% estimated ejection fraction.  2. Abnormal septal motion consistent with RV pacemaker.  3. Spectral Doppler shows an impaired relaxation pattern of left ventricular diastolic filling.  4. There is reduced right ventricular systolic function.  5. There is moderate mitral annular calcification.  6. RVSP within normal limits.  7. Compared to previous echo on 12/18/2020, the ejection fraction had recovered from 25-30% to 50-55% on this study.       Cardiac Device Check:  Today - no recurrent events.      Lab review: I have Chemistry CMP:   Lab Results   Component Value Date    ALBUMIN 4.3 10/17/2023    CALCIUM 9.4 10/17/2023    CO2 27 10/17/2023    CREATININE 1.17 (H) 10/17/2023    GLUCOSE 81 10/17/2023    BILITOT 0.5 07/19/2023    PROT 7.3 07/19/2023    ALT 37 07/19/2023    AST 33 07/19/2023    ALKPHOS 60 07/19/2023   , Chemistry BMP   Lab Results   Component Value Date    GLUCOSE 81 10/17/2023    CALCIUM 9.4 10/17/2023    CO2 27 10/17/2023    CREATININE 1.17 (H) 10/17/2023   , and CBC:  Lab Results   Component Value Date    WBC 7.1 10/17/2023    RBC 5.36 (H) 10/17/2023    HGB 14.9 10/17/2023    HCT 45.6 10/17/2023    MCV 85 10/17/2023    MCH 27.8 10/17/2023    MCHC 32.7 10/17/2023    RDW 13.2 10/17/2023    MPV 11.3 10/17/2023    NRBC 0.0 10/17/2023       Assessment/Plan   Problem List Items Addressed This  Visit             ICD-10-CM       Cardiac and Vasculature    Cardiomyopathy (CMS/HCC) - Primary I42.9   Remain off of amiodarone.  Continue metoprolol xl.  Establish remote ICD connection and check in 3 months.  RTC 12 months.     Kiki Hsieh MD

## 2023-12-21 ENCOUNTER — OFFICE VISIT (OUTPATIENT)
Dept: PRIMARY CARE | Facility: CLINIC | Age: 68
End: 2023-12-21
Payer: COMMERCIAL

## 2023-12-21 VITALS
TEMPERATURE: 97.2 F | SYSTOLIC BLOOD PRESSURE: 105 MMHG | HEART RATE: 83 BPM | WEIGHT: 121.8 LBS | DIASTOLIC BLOOD PRESSURE: 68 MMHG | HEIGHT: 59 IN | RESPIRATION RATE: 16 BRPM | BODY MASS INDEX: 24.56 KG/M2 | OXYGEN SATURATION: 97 %

## 2023-12-21 DIAGNOSIS — S03.00XD TMJ (DISLOCATION OF TEMPOROMANDIBULAR JOINT), SUBSEQUENT ENCOUNTER: Primary | ICD-10-CM

## 2023-12-21 PROCEDURE — 99213 OFFICE O/P EST LOW 20 MIN: CPT

## 2023-12-21 PROCEDURE — 1160F RVW MEDS BY RX/DR IN RCRD: CPT

## 2023-12-21 PROCEDURE — 1126F AMNT PAIN NOTED NONE PRSNT: CPT

## 2023-12-21 PROCEDURE — 3074F SYST BP LT 130 MM HG: CPT

## 2023-12-21 PROCEDURE — 1036F TOBACCO NON-USER: CPT

## 2023-12-21 PROCEDURE — RXMED WILLOW AMBULATORY MEDICATION CHARGE

## 2023-12-21 PROCEDURE — 1159F MED LIST DOCD IN RCRD: CPT

## 2023-12-21 PROCEDURE — 3078F DIAST BP <80 MM HG: CPT

## 2023-12-21 ASSESSMENT — PAIN SCALES - GENERAL: PAINLEVEL: 0-NO PAIN

## 2023-12-21 ASSESSMENT — ENCOUNTER SYMPTOMS
SHORTNESS OF BREATH: 0
WHEEZING: 0
HEADACHES: 0
NAUSEA: 0
VOMITING: 0
COUGH: 0
PALPITATIONS: 0
FATIGUE: 0
ABDOMINAL PAIN: 0
UNEXPECTED WEIGHT CHANGE: 0
FEVER: 0
DIZZINESS: 0
LIGHT-HEADEDNESS: 0
DIARRHEA: 0

## 2023-12-21 NOTE — PROGRESS NOTES
I reviewed the resident/fellow's documentation and discussed the patient with the resident/fellow. I agree with the resident/fellow's medical decision making as documented in the note.    Joaquín Yan MD

## 2023-12-21 NOTE — PROGRESS NOTES
Subjective   Patient ID: Sandy Smith is a 68 y.o. female who presents for Follow-up and Earache (Ear pain for about a month).    HPI    #TMJ  The patient is here for a follow-up regarding their temporomandibular joint (TMJ) concerns, having previously been evaluated by an ENT specialist. The ENT provided a comprehensive set of recommendations, including a course of 600mg Ibuprofen three times daily with meals for 10 days, alongside Famotidine 20mg at bedtime while taking Ibuprofen. The patient has just started to follow these instructions. Additionally, they have incorporated warm compresses three times a day, jaw massages three times daily, and have adhered to a soft, non-chew diet. The patient is limiting talking as advised and is using a counter  until they can see a dentist. The ENT suggested considering a dentist visit to assess jaw alignment and potentially obtain a custom . The patient plans to follow this advice and is scheduled for a follow-up in three months, or sooner if needed. They actively participate in discussions about their progress, expressing a readiness to pursue dental intervention and address any concerns related to their TMJ symptoms or the recommended regimen. Ongoing monitoring and management are key components of the patient's healthcare plan.    Review of Systems   Constitutional:  Negative for fatigue, fever and unexpected weight change.   Respiratory:  Negative for cough, shortness of breath and wheezing.    Cardiovascular:  Negative for chest pain, palpitations and leg swelling.   Gastrointestinal:  Negative for abdominal pain, diarrhea, nausea and vomiting.   Neurological:  Negative for dizziness, light-headedness and headaches.     Previous history  Past Medical History:   Diagnosis Date    Encounter for general adult medical examination without abnormal findings 01/31/2022    Encounter for initial preventive physical examination covered by Medicare     Otitis media, unspecified, right ear 01/31/2014    Acute right otitis media    Personal history of other diseases of the digestive system 07/21/2013    History of esophageal reflux    Personal history of other diseases of the musculoskeletal system and connective tissue 07/21/2013    History of low back pain    Personal history of other specified conditions 07/21/2013    History of angioedema     Past Surgical History:   Procedure Laterality Date    COLONOSCOPY  08/23/2013    Colonoscopy (Fiberoptic)    CT ANGIO CORONARY ART WITH HEARTFLOW IF SCORE >30%  11/20/2018    CT HEART CORONARY ANGIOGRAM 11/20/2018 [facility] [place] ANCILLARY LEGACY    CT ANGIO NECK  1/29/2022    CT NECK ANGIO W AND WO IV CONTRAST 1/29/2022 [facility] CLINICAL LEGACY    OTHER SURGICAL HISTORY  02/08/2021    Pacemaker insertion    OTHER SURGICAL HISTORY  11/08/2018    Umbilical hernia repair    OTHER SURGICAL HISTORY  12/05/2013    Wrist Surgery     Social History     Tobacco Use    Smoking status: Former     Types: Cigarettes     Passive exposure: Past    Smokeless tobacco: Never   Substance Use Topics    Alcohol use: Never    Drug use: Never     Family History   Problem Relation Name Age of Onset    Aneurysm Mother      Heart disease Mother      Hyperlipidemia Mother      Hypertension Mother      Heart disease Father      Hypertension Father      Other (cardiac disorder) Sister      Coronary artery disease Sister      Other (cardiac disorder) Brother      Coronary artery disease Brother       Allergies   Allergen Reactions    Cephalexin Unknown    Lisinopril Angioedema     Current Outpatient Medications   Medication Instructions    acetaminophen (TYLENOL) 650 mg, oral, Every 6 hours PRN    alendronate (Fosamax) 70 mg tablet TAKE 1 TABLET BY MOUTH EVERY 7 DAYS. TAKE IN THE MORNING WITH A FULL GLASS OF WATER, ON AN EMPTY STOMACH, AND DO NOT TAKE ANYTHING ELSE BY MOUTH OR LIE FOR THE NEXT 30 MINUTES.    calcium carbonate 215 mg calcium (500 mg) tablet,chewable 1  tablet, oral, 2 times daily    cetirizine (ZyrTEC) 10 mg tablet Take 1 tablet (10 mg) by mouth once daily as needed.    cholecalciferol, vitamin D3, 10 mcg (400 unit) capsule TAKE 1 CAPSULE Daily    ciclopirox (Penlac) 8 % solution APPLY TO AFFECTED TOENAILS ONCE DAILY.    diclofenac sodium (Voltaren) 1 % gel gel Apply sparingly to affected areas three times a day as needed for pain    empagliflozin (Jardiance) 10 mg TAKE 1 TABLET BY MOUTH ONCE DAILY    famotidine (PEPCID) 20 mg, oral, Daily    fluticasone (Flonase) 50 mcg/actuation nasal spray USE 1 TO 2 SPRAYS IN EACH NOSTRIL ONCE DAILY.    hydrALAZINE (Apresoline) 10 mg tablet TAKE 1 TABLET BY MOUTH EVERY 8 HOURS    ibuprofen 600 mg, oral, Every 8 hours    isosorbide dinitrate (Isordil) 20 mg tablet TAKE 1 TABLET BY MOUTH THREE TIMES A DAY    lidocaine (Xylocaine) 5 % cream cream Apply to area of pain once daily as needed.    methocarbamol (Robaxin) 500 mg tablet Take 1-2 tablets as needed at bedtime for muscle tightness, spasms    metoprolol succinate XL (TOPROL-XL) 25 mg, oral, Daily    NaCl-NaHCO3-hyaluron sod-aloe (NasogeL) 0.9 % spray gel USE AS DIRECTED.    psyllium husk, aspartame, (Metamucil Sugar-Free, aspart,) 3.4 gram/5.8 gram powder Use as directed 1-3 times daily as needed to maintain regular bowel movements.    sodium chloride-aloe vera spray,non-aerosol 1 spray each nostril at bedtime    spironolactone (Aldactone) 25 mg tablet TAKE 1 TABLET (25 MG) BY MOUTH ONCE DAILY.       Objective       Physical Exam  HENT:      Head: Normocephalic and atraumatic.   Eyes:      General: No scleral icterus.     Conjunctiva/sclera: Conjunctivae normal.   Cardiovascular:      Rate and Rhythm: Normal rate and regular rhythm.      Heart sounds: No murmur heard.     No friction rub. No gallop.   Pulmonary:      Effort: Pulmonary effort is normal. No respiratory distress.      Breath sounds: Normal breath sounds.   Abdominal:      General: There is no distension.       Palpations: Abdomen is soft.      Tenderness: There is no abdominal tenderness.   Musculoskeletal:         General: Normal range of motion.   Skin:     General: Skin is warm and dry.   Neurological:      General: No focal deficit present.      Mental Status: She is alert and oriented to person, place, and time.   Psychiatric:         Mood and Affect: Mood normal.       Assessment/Plan   Sandy Smith is a 68 y.o. female who presents for the concerns below:    Problem List Items Addressed This Visit    None  Visit Diagnoses       TMJ (dislocation of temporomandibular joint), subsequent encounter    -  Primary          #TMJ  - follow up with ENT recs       Discussed with:  Dr. Yan  Return in : 3 months    Portions of this note were generated using digital voice recognition software, and may contain grammatical errors       Arturo Armstrong, DO  PGY-2, Family Medicine

## 2023-12-22 ENCOUNTER — APPOINTMENT (OUTPATIENT)
Dept: PHYSICAL THERAPY | Facility: HOSPITAL | Age: 68
End: 2023-12-22
Payer: COMMERCIAL

## 2023-12-26 ENCOUNTER — APPOINTMENT (OUTPATIENT)
Dept: PHYSICAL THERAPY | Facility: HOSPITAL | Age: 68
End: 2023-12-26
Payer: COMMERCIAL

## 2023-12-28 ENCOUNTER — PHARMACY VISIT (OUTPATIENT)
Dept: PHARMACY | Facility: CLINIC | Age: 68
End: 2023-12-28
Payer: COMMERCIAL

## 2023-12-29 ENCOUNTER — TREATMENT (OUTPATIENT)
Dept: PHYSICAL THERAPY | Facility: HOSPITAL | Age: 68
End: 2023-12-29
Payer: COMMERCIAL

## 2023-12-29 DIAGNOSIS — S83.402D SPRAIN OF COLLATERAL LIGAMENT OF LEFT KNEE, SUBSEQUENT ENCOUNTER: ICD-10-CM

## 2023-12-29 DIAGNOSIS — M25.562 LEFT KNEE PAIN, UNSPECIFIED CHRONICITY: Primary | ICD-10-CM

## 2023-12-29 DIAGNOSIS — S82.142D DISPLACED BICONDYLAR FRACTURE OF LEFT TIBIA, SUBSEQUENT ENCOUNTER FOR CLOSED FRACTURE WITH ROUTINE HEALING: ICD-10-CM

## 2023-12-29 PROCEDURE — 97110 THERAPEUTIC EXERCISES: CPT | Mod: GP | Performed by: PHYSICAL THERAPIST

## 2023-12-29 NOTE — PROGRESS NOTES
Physical Therapy    Physical Therapy Treatment    Patient Name: Sandy Smith  MRN: [id number]  Today's Date: 12/29/2023  Time Calculation  Start Time: 0810  Stop Time: 0850  Time Calculation (min): 40 min      Assessment:  Good improvement with functional strengthening, ROM and balance today, meeting goals, pt is encouraged with progress, reviewed with pt to continue with ROM and functional strengthening today.        Plan:  2 more sessions, if continues to do well plan on discharge.       Precautions: pt has defibrillator    OP PT Plan  PT Plan: Skilled PT (visit 8)  PT Frequency: 1 time per week  Duration: 10 weeks  Onset Date: 09/19/23  Certification Period Start Date: 10/16/23  Certification Period End Date: 01/14/24  Number of Treatments Authorized: MN  Rehab Potential: Good  Plan of Care Agreement: Patient    Current Problem  1. Left knee pain, unspecified chronicity        2. Sprain of collateral ligament of left knee, subsequent encounter        3. Displaced bicondylar fracture of left tibia, subsequent encounter for closed fracture with routine healing              Subjective:  Pt states walking is  better, but still has pain with going up and down stairs and with bending, pain rated 4/10 today.    General    General  Reason for Referral: closed fx L tibial plateau with routine healing, sprain of L knee collateral ligament, Thao Rosa lesion  Referred By: Elizabeth Abdullahi MD  Precautions  Precautions  Precautions Comment: hx of difrilator 2021  Pain       Objective   SLS today L 5-35sec  Ascending and descending with railing on stairs with good reciprocal gait   L knee flex AROM 125deg, AAROM 128deg     Activity Tolerance:       Treatments:  Therapeutic exercises:  Recumbent bike L4 x 8min  Forward lean onto chair for knee flex ROM stretch x10  SLS with intermittent HHA x 1 min R/L  6in step, HHA, step up and over, forward and retro x10 L  8in step HHA, step up and over, forward and retro x10  R/L  6in step HHA, single leg squat with tap x10 L   8in step HHA, single leg squat with tap x10 L  Leg curl single leg 10# x 15 R/L, 25# x15 B   Leg ext 1 plate x15 R/L, 2 plates x15 B   SKTC with self overpressure 10 sec x 3  Brief recheck  HEP: knee flex with overpressure (SKTC and with chair rocking) and forward and lateral step downs.    OP EDUCATION:       Goals:   To be met at time of discharge:  -- Decrease pain to __<1/10_.  -- Independent with HEP.  -- ROM: improve L Knee flex to 130deg or better without increased pain.   -- Strength: MMT L hip and knee 4+/5 with no increased pain.    -- Functional outcome: able to SLS 10sec L side without loss of balance or increased pain for safety.  Able to resume amb on level and stairs with no deviations, reciprocal gait with pain <1/10.  Able to tolerate WB exercises in clinic x25min without rest with pain <1/10.  Improve LEFS to >68/80.

## 2024-01-05 ENCOUNTER — TREATMENT (OUTPATIENT)
Dept: PHYSICAL THERAPY | Facility: HOSPITAL | Age: 69
End: 2024-01-05
Payer: COMMERCIAL

## 2024-01-05 DIAGNOSIS — M25.562 LEFT KNEE PAIN, UNSPECIFIED CHRONICITY: Primary | ICD-10-CM

## 2024-01-05 DIAGNOSIS — S83.402D SPRAIN OF COLLATERAL LIGAMENT OF LEFT KNEE, SUBSEQUENT ENCOUNTER: ICD-10-CM

## 2024-01-05 DIAGNOSIS — S82.142D DISPLACED BICONDYLAR FRACTURE OF LEFT TIBIA, SUBSEQUENT ENCOUNTER FOR CLOSED FRACTURE WITH ROUTINE HEALING: ICD-10-CM

## 2024-01-05 PROBLEM — R73.03 PREDIABETES: Status: ACTIVE | Noted: 2023-09-11

## 2024-01-05 PROBLEM — M31.6 TEMPORAL ARTERITIS (MULTI): Status: ACTIVE | Noted: 2023-11-29

## 2024-01-05 PROBLEM — B35.1 ONYCHOMYCOSIS OF TOENAIL: Status: ACTIVE | Noted: 2024-01-05

## 2024-01-05 PROBLEM — L82.1 OTHER SEBORRHEIC KERATOSIS: Status: ACTIVE | Noted: 2018-01-04

## 2024-01-05 PROCEDURE — 97110 THERAPEUTIC EXERCISES: CPT | Mod: GP | Performed by: PHYSICAL THERAPIST

## 2024-01-06 LAB
ATRIAL RATE: 70 BPM
P AXIS: 28 DEGREES
P OFFSET: 197 MS
P ONSET: 143 MS
PR INTERVAL: 148 MS
Q ONSET: 217 MS
QRS COUNT: 11 BEATS
QRS DURATION: 90 MS
QT INTERVAL: 450 MS
QTC CALCULATION(BAZETT): 486 MS
QTC FREDERICIA: 474 MS
R AXIS: 4 DEGREES
T AXIS: 40 DEGREES
T OFFSET: 442 MS
VENTRICULAR RATE: 70 BPM

## 2024-01-08 ENCOUNTER — OFFICE VISIT (OUTPATIENT)
Dept: CARDIOLOGY | Facility: CLINIC | Age: 69
End: 2024-01-08
Payer: COMMERCIAL

## 2024-01-08 VITALS
HEIGHT: 59 IN | WEIGHT: 120.5 LBS | BODY MASS INDEX: 24.29 KG/M2 | OXYGEN SATURATION: 98 % | DIASTOLIC BLOOD PRESSURE: 64 MMHG | SYSTOLIC BLOOD PRESSURE: 92 MMHG | HEART RATE: 73 BPM

## 2024-01-08 DIAGNOSIS — I42.0 DILATED CARDIOMYOPATHY (MULTI): Primary | ICD-10-CM

## 2024-01-08 PROCEDURE — 1159F MED LIST DOCD IN RCRD: CPT | Performed by: INTERNAL MEDICINE

## 2024-01-08 PROCEDURE — 1036F TOBACCO NON-USER: CPT | Performed by: INTERNAL MEDICINE

## 2024-01-08 PROCEDURE — 99214 OFFICE O/P EST MOD 30 MIN: CPT | Performed by: INTERNAL MEDICINE

## 2024-01-08 PROCEDURE — 3078F DIAST BP <80 MM HG: CPT | Performed by: INTERNAL MEDICINE

## 2024-01-08 PROCEDURE — 3074F SYST BP LT 130 MM HG: CPT | Performed by: INTERNAL MEDICINE

## 2024-01-08 PROCEDURE — 1126F AMNT PAIN NOTED NONE PRSNT: CPT | Performed by: INTERNAL MEDICINE

## 2024-01-08 ASSESSMENT — COLUMBIA-SUICIDE SEVERITY RATING SCALE - C-SSRS
2. HAVE YOU ACTUALLY HAD ANY THOUGHTS OF KILLING YOURSELF?: NO
6. HAVE YOU EVER DONE ANYTHING, STARTED TO DO ANYTHING, OR PREPARED TO DO ANYTHING TO END YOUR LIFE?: NO
1. IN THE PAST MONTH, HAVE YOU WISHED YOU WERE DEAD OR WISHED YOU COULD GO TO SLEEP AND NOT WAKE UP?: NO

## 2024-01-08 ASSESSMENT — PAIN SCALES - GENERAL: PAINLEVEL: 0-NO PAIN

## 2024-01-08 ASSESSMENT — ENCOUNTER SYMPTOMS
OCCASIONAL FEELINGS OF UNSTEADINESS: 0
DEPRESSION: 0
LOSS OF SENSATION IN FEET: 0

## 2024-01-08 NOTE — PATIENT INSTRUCTIONS
Thank you for coming to your cardiology visit today.  Please continue on your current medication.  Please let me know if you have any further symptoms of rapid heart beating, worsening lightheadedness, rest or exertional chest pain, water weight gain or shortness of breath.  Please make a follow-up appointment to see me in 6 months.  Please have blood chemistries, blood count , and brain natruretic peptide (elevates in the setting of congestive heart failure) drawn at your convenience.

## 2024-01-08 NOTE — PROGRESS NOTES
"Primary Care Physician: Olga Newsome MD  Date of Visit: 01/08/2024  9:40 AM EST  [place] [facility] [place] [address]     Chief Complaint:   Chief Complaint   Patient presents with    Follow-up     Tingling in left hand and fingers        HPI / Summary:   Sandy Smith is a 68 y.o. female presents for followup.   Excerpted from last EP note     NICM at least since 2018. At that time her EF was 35-40%. She was referred for evaluation of frequent PVC,s and nonsustained VT. 10/21/20. She was then admitted with hypertension and \"bradycardia\" which was due to frequent PVC's. Her echo 2020 showed EF 20% and cath showed no obstructive CAD. After discharge she wore a monitor which showed PVC burden of 39%. she has multiple morphologies of PVC and non sustained VT up to 44 beats at very rapid rates and likely not monomorphic.  She underwent RFA for PVC and initiation of amiodarone. 10/2020. Recent holter showed PVC burden now 0.7%, She wore the lifevest but developed rib soreness and after recent echo showed some improvement in her EF 25-30% we discontinued it.      She then underwent implantation of an ICD due to continue low EF.   On amiodarone we did not see any recurrence and recently EF was noted to be recovered. The amiodarone was discontinued for her to be able to take treatment for hepatitis C.  Off of amiodarone she has not had any recurrent arrhythmias.    Her last echocardiogram done on August 16, 2023 showed low normal EF with normal LV chamber size, there is reported reduced RV function TAPSE was 14, S prime 0.08.    At her last device check no therapy was provided    Spring of 2023 she had a scan for abdominal aortic aneurysm which was negative    October 17 renal panel BUN 15 creatinine 1.17 EGFR 51 K of 5.1.  Hematocrit of 45.6, white count 7.1 platelet 221, September 2023 A1c of 5.4.  Last TSH in July 2023 was 6.5    October 17 ER visit for dizziness.   GERd, left wrist pain. Occasional " dizziness.   Teeth clench and gets jaw pain.  No recent illness, hospitalizations or surgery. Poor diet at times.    No effort related DAVIS or CP.  No edema  Specialty Problems    None       Past Medical History:   Diagnosis Date    Encounter for general adult medical examination without abnormal findings 01/31/2022    Encounter for initial preventive physical examination covered by Medicare    Otitis media, unspecified, right ear 01/31/2014    Acute right otitis media    Personal history of other diseases of the digestive system 07/21/2013    History of esophageal reflux    Personal history of other diseases of the musculoskeletal system and connective tissue 07/21/2013    History of low back pain    Personal history of other specified conditions 07/21/2013    History of angioedema          Past Surgical History:   Procedure Laterality Date    COLONOSCOPY  08/23/2013    Colonoscopy (Fiberoptic)    CT ANGIO CORONARY ART WITH HEARTFLOW IF SCORE >30%  11/20/2018    [place] HEART [place]ONARY [place] [facility] [place]    CT ANGIO NECK  1/29/2022    CT NECK ANGIO W AND WO IV CONTRAST 1/29/2022 [facility] CLINICAL LEGACY    OTHER SURGICAL HISTORY  02/08/2021    Pacemaker insertion    OTHER SURGICAL HISTORY  11/08/2018    Umbilical hernia repair    OTHER SURGICAL HISTORY  12/05/2013    Wrist Surgery          Social History:   reports that she has quit smoking. Her smoking use included cigarettes. She has been exposed to tobacco smoke. She has never used smokeless tobacco. She reports that she does not drink alcohol and does not use drugs.      Allergies:  Allergies   Allergen Reactions    Cephalexin Unknown    Lisinopril Angioedema       Outpatient Medications:  Current Outpatient Medications   Medication Instructions    acetaminophen (TYLENOL) 650 mg, oral, Every 6 hours PRN    alendronate (Fosamax) 70 mg tablet TAKE 1 TABLET BY MOUTH EVERY 7 DAYS. TAKE IN THE MORNING WITH A FULL GLASS OF WATER, ON AN EMPTY STOMACH, AND  "DO NOT TAKE ANYTHING ELSE BY MOUTH OR LIE FOR THE NEXT 30 MINUTES.    calcium carbonate 215 mg calcium (500 mg) tablet,chewable 1 tablet, oral, 2 times daily    cetirizine (ZyrTEC) 10 mg tablet Take 1 tablet (10 mg) by mouth once daily as needed.    cholecalciferol, vitamin D3, 10 mcg (400 unit) capsule TAKE 1 CAPSULE Daily    ciclopirox (Penlac) 8 % solution APPLY TO AFFECTED TOENAILS ONCE DAILY.    diclofenac sodium (Voltaren) 1 % gel gel Apply sparingly to affected areas three times a day as needed for pain    empagliflozin (Jardiance) 10 mg TAKE 1 TABLET BY MOUTH ONCE DAILY    famotidine (PEPCID) 20 mg, oral, Daily    fluticasone (Flonase) 50 mcg/actuation nasal spray USE 1 TO 2 SPRAYS IN EACH NOSTRIL ONCE DAILY.    hydrALAZINE (Apresoline) 10 mg tablet TAKE 1 TABLET BY MOUTH EVERY 8 HOURS    isosorbide dinitrate (Isordil) 20 mg tablet TAKE 1 TABLET BY MOUTH THREE TIMES A DAY    lidocaine (Xylocaine) 5 % cream cream Apply to area of pain once daily as needed.    methocarbamol (Robaxin) 500 mg tablet Take 1-2 tablets as needed at bedtime for muscle tightness, spasms    metoprolol succinate XL (TOPROL-XL) 25 mg, oral, Daily    NaCl-NaHCO3-hyaluron sod-aloe (NasogeL) 0.9 % spray gel USE AS DIRECTED.    psyllium husk, aspartame, (Metamucil Sugar-Free, aspart,) 3.4 gram/5.8 gram powder Use as directed 1-3 times daily as needed to maintain regular bowel movements.    sodium chloride-aloe vera spray,non-aerosol 1 spray each nostril at bedtime    spironolactone (Aldactone) 25 mg tablet TAKE 1 TABLET (25 MG) BY MOUTH ONCE DAILY.       ROS     Physical Exam:  Vitals:    01/08/24 1002   BP: 92/64   BP Location: Right arm   Patient Position: Sitting   BP Cuff Size: Adult   Pulse: 73   SpO2: 98%   Weight: 54.7 kg (120 lb 8 oz)   Height: 1.499 m (4' 11\")     Wt Readings from Last 5 Encounters:   01/08/24 54.7 kg (120 lb 8 oz)   12/21/23 55.2 kg (121 lb 12.8 oz)   12/20/23 55.5 kg (122 lb 4.8 oz)   12/20/23 55.1 kg (121 lb 6.4 " oz)   11/29/23 54.8 kg (120 lb 12.8 oz)     Body mass index is 24.34 kg/m².   Developed female in no acute distress.  JVP was not elevated.  Regular rhythm no gallop.  Clear lungs.  Soft abdomen.  No dependent edema with intact pulses     Last Labs:  CMP:  Recent Labs     10/17/23  2203 04/08/23  1529 01/17/23  1658 07/20/22  2120 02/24/22  0557    142 145 143 137   K 5.1 4.7 4.0 4.1 4.5    110* 108* 107 101   CO2 27 26 28 29 27   ANIONGAP 15 11 13 11 14   BUN 15 13 22 16 24*   CREATININE 1.17* 1.03 1.21* 1.08* 1.11*   EGFR 51*  --   --   --   --    GLUCOSE 81 94 76 90 96     Recent Labs     10/17/23  2203 07/19/23  1400 04/08/23  1529 03/21/23  1031 01/17/23  1658 07/20/22  2120   ALBUMIN 4.3 4.0 3.5 4.3 4.1 3.9   ALKPHOS  --  60 51 69 68 81   ALT  --  37 40 52* 46* 40   AST  --  33 36 45* 41* 33   BILITOT  --  0.5 0.5 0.4 0.4 0.5     CBC:  Recent Labs     10/17/23  2203 07/19/23  1400 04/08/23  1529 01/17/23  1658 07/20/22  2120   WBC 7.1 5.6 5.7 5.8 6.8   HGB 14.9 14.0 14.2 13.5 12.8   HCT 45.6 45.1 45.4 43.7 39.6    238 207 240 221   MCV 85 90 85 88 87     COAG:   Recent Labs     02/02/22  1141 01/29/22  0855 01/26/21  1009 01/16/21  0651 10/25/20  0712   INR 0.9 0.9 1.0 0.9 1.0     HEME/ENDO:  Recent Labs     09/11/23  1729 07/21/23  1127 01/28/22  1147 06/07/21  1118 10/24/20  0330 09/21/20  0641 09/17/20  0634 06/04/18  1558   TSH  --  6.54* 6.56* 6.83* 2.08   < >  --  2.02   HGBA1C 5.4  --  5.8*  --   --   --  5.4 5.4    < > = values in this interval not displayed.      CARDIAC:   Recent Labs     04/08/23  1529 07/20/22  2120 10/21/20  1825 09/20/20  2210 09/15/20  2312   TROPHS 14 10  --   --   --    BNP 79  --  910* 356* 1,821*     Recent Labs     07/21/23  1127 09/17/20  0634 06/15/18  1302   CHOL 191 189 172   LDLF 97 104* 92   HDL 74.4 68.1 62.9   TRIG 98 84 88       Last Cardiology Tests:  ECG:      Echo:  Echo Results:  No results found for this or any previous visit from the past  3650 days.       Cath:      Stress Test:  Stress Results:  No results found for this or any previous visit from the past 365 days.         Cardiac Imaging:  ECG 12 lead (Clinic Performed)  Normal sinus rhythm  Normal ECG  When compared with ECG of 08-APR-2023 13:08,  Criteria for Septal infarct are no longer Present  Confirmed by Steve Mares ([id number]) on 1/6/2024 9:39:09 PM        Assessment/Plan   From the perspective of her heart failure and arrhythmia all these issues seem to be quiescent at this time.  No recent device therapies.  No symptoms to suggest either arrhythmia, heart failure or angina.  Will do a surveillance set of labs including brain natruretic peptide, renal function panel and CBC.  Elan to keep active.  No change in drug treatment at this time.  Office follow-up suggested in 6 months        Orders:  No orders of the defined types were placed in this encounter.     Followup Appts:  Future Appointments   Date Time Provider Department Center   3/6/2024  2:30 PM Olga Newsome MD [id number]           ____________________________________________________________  Lupillo Haddad MD    Senior Attending Physician  [facility]  [facility]

## 2024-01-12 DIAGNOSIS — M26.69 TMJ INFLAMMATION: ICD-10-CM

## 2024-01-15 ENCOUNTER — LAB (OUTPATIENT)
Dept: LAB | Facility: LAB | Age: 69
End: 2024-01-15
Payer: COMMERCIAL

## 2024-01-15 DIAGNOSIS — I42.0 DILATED CARDIOMYOPATHY (MULTI): ICD-10-CM

## 2024-01-15 LAB
ANION GAP SERPL CALC-SCNC: 13 MMOL/L (ref 10–20)
BNP SERPL-MCNC: 66 PG/ML (ref 0–99)
BUN SERPL-MCNC: 15 MG/DL (ref 6–23)
CALCIUM SERPL-MCNC: 9.6 MG/DL (ref 8.6–10.6)
CHLORIDE SERPL-SCNC: 108 MMOL/L (ref 98–107)
CO2 SERPL-SCNC: 26 MMOL/L (ref 21–32)
CREAT SERPL-MCNC: 1.06 MG/DL (ref 0.5–1.05)
EGFRCR SERPLBLD CKD-EPI 2021: 57 ML/MIN/1.73M*2
ERYTHROCYTE [DISTWIDTH] IN BLOOD BY AUTOMATED COUNT: 13.8 % (ref 11.5–14.5)
GLUCOSE SERPL-MCNC: 81 MG/DL (ref 74–99)
HCT VFR BLD AUTO: 45.5 % (ref 36–46)
HGB BLD-MCNC: 14.2 G/DL (ref 12–16)
MCH RBC QN AUTO: 27.5 PG (ref 26–34)
MCHC RBC AUTO-ENTMCNC: 31.2 G/DL (ref 32–36)
MCV RBC AUTO: 88 FL (ref 80–100)
NRBC BLD-RTO: 0 /100 WBCS (ref 0–0)
PLATELET # BLD AUTO: 244 X10*3/UL (ref 150–450)
POTASSIUM SERPL-SCNC: 4 MMOL/L (ref 3.5–5.3)
RBC # BLD AUTO: 5.17 X10*6/UL (ref 4–5.2)
SODIUM SERPL-SCNC: 143 MMOL/L (ref 136–145)
WBC # BLD AUTO: 7.4 X10*3/UL (ref 4.4–11.3)

## 2024-01-15 PROCEDURE — 83880 ASSAY OF NATRIURETIC PEPTIDE: CPT

## 2024-01-15 PROCEDURE — 80048 BASIC METABOLIC PNL TOTAL CA: CPT

## 2024-01-15 PROCEDURE — 36415 COLL VENOUS BLD VENIPUNCTURE: CPT

## 2024-01-15 PROCEDURE — 85027 COMPLETE CBC AUTOMATED: CPT

## 2024-01-15 RX ORDER — FAMOTIDINE 20 MG/1
20 TABLET, FILM COATED ORAL DAILY
Qty: 30 TABLET | Refills: 0 | Status: SHIPPED | OUTPATIENT
Start: 2024-01-15 | End: 2024-03-20 | Stop reason: WASHOUT

## 2024-01-16 ENCOUNTER — TELEPHONE (OUTPATIENT)
Dept: CARDIOLOGY | Facility: CLINIC | Age: 69
End: 2024-01-16
Payer: COMMERCIAL

## 2024-01-16 NOTE — TELEPHONE ENCOUNTER
----- Message from Lupillo Haddad MD sent at 1/16/2024 10:22 AM EST -----  Please call the patient regarding her abnormal result.Labs all look good , no med changes

## 2024-01-16 NOTE — TELEPHONE ENCOUNTER
Attempted call to both phone numbers listed in pts chart, unable to leave VM d/t VM box full + remote access code required. Will try to call again later.

## 2024-01-17 ENCOUNTER — TELEPHONE (OUTPATIENT)
Dept: CARDIOLOGY | Facility: CLINIC | Age: 69
End: 2024-01-17
Payer: COMMERCIAL

## 2024-01-17 NOTE — TELEPHONE ENCOUNTER
Result Communication    Resulted Orders   Basic Metabolic Panel   Result Value Ref Range    Glucose 81 74 - 99 mg/dL    Sodium 143 136 - 145 mmol/L    Potassium 4.0 3.5 - 5.3 mmol/L    Chloride 108 (H) 98 - 107 mmol/L    Bicarbonate 26 21 - 32 mmol/L    Anion Gap 13 10 - 20 mmol/L    Urea Nitrogen 15 6 - 23 mg/dL    Creatinine 1.06 (H) 0.50 - 1.05 mg/dL    eGFR 57 (L) >60 mL/min/1.73m*2      Comment:      Calculations of estimated GFR are performed using the 2021 CKD-EPI Study Refit equation without the race variable for the IDMS-Traceable creatinine methods.  https://jasn.asnjournals.org/content/early/2021/09/22/ASN.5464900585    Calcium 9.6 8.6 - 10.6 mg/dL   CBC   Result Value Ref Range    WBC 7.4 4.4 - 11.3 x10*3/uL    nRBC 0.0 0.0 - 0.0 /100 WBCs    RBC 5.17 4.00 - 5.20 x10*6/uL    Hemoglobin 14.2 12.0 - 16.0 g/dL    Hematocrit 45.5 36.0 - 46.0 %    MCV 88 80 - 100 fL    MCH 27.5 26.0 - 34.0 pg    MCHC 31.2 (L) 32.0 - 36.0 g/dL    RDW 13.8 11.5 - 14.5 %    Platelets 244 150 - 450 x10*3/uL   B-Type Natriuretic Peptide   Result Value Ref Range    BNP 66 0 - 99 pg/mL    Narrative       <100 pg/mL - Heart failure unlikely  100-299 pg/mL - Intermediate probability of acute heart                  failure exacerbation. Correlate with clinical                  context and patient history.    >=300 pg/mL - Heart Failure likely. Correlate with clinical                  context and patient history.     Biotin interference may cause falsely decreased results. Patients taking a Biotin dose of up to 5 mg/day should refrain from taking Biotin for 24 hours before sample  collection. Providers may contact their local laboratory for further information.       9:08 AM      Results were successfully communicated with the patient and they acknowledged their understanding.  Result Communication    Resulted Orders   Basic Metabolic Panel   Result Value Ref Range    Glucose 81 74 - 99 mg/dL    Sodium 143 136 - 145 mmol/L    Potassium  4.0 3.5 - 5.3 mmol/L    Chloride 108 (H) 98 - 107 mmol/L    Bicarbonate 26 21 - 32 mmol/L    Anion Gap 13 10 - 20 mmol/L    Urea Nitrogen 15 6 - 23 mg/dL    Creatinine 1.06 (H) 0.50 - 1.05 mg/dL    eGFR 57 (L) >60 mL/min/1.73m*2      Comment:      Calculations of estimated GFR are performed using the 2021 CKD-EPI Study Refit equation without the race variable for the IDMS-Traceable creatinine methods.  https://jasn.asnjournals.org/content/early/2021/09/22/ASN.7376377086    Calcium 9.6 8.6 - 10.6 mg/dL   CBC   Result Value Ref Range    WBC 7.4 4.4 - 11.3 x10*3/uL    nRBC 0.0 0.0 - 0.0 /100 WBCs    RBC 5.17 4.00 - 5.20 x10*6/uL    Hemoglobin 14.2 12.0 - 16.0 g/dL    Hematocrit 45.5 36.0 - 46.0 %    MCV 88 80 - 100 fL    MCH 27.5 26.0 - 34.0 pg    MCHC 31.2 (L) 32.0 - 36.0 g/dL    RDW 13.8 11.5 - 14.5 %    Platelets 244 150 - 450 x10*3/uL   B-Type Natriuretic Peptide   Result Value Ref Range    BNP 66 0 - 99 pg/mL    Narrative       <100 pg/mL - Heart failure unlikely  100-299 pg/mL - Intermediate probability of acute heart                  failure exacerbation. Correlate with clinical                  context and patient history.    >=300 pg/mL - Heart Failure likely. Correlate with clinical                  context and patient history.     Biotin interference may cause falsely decreased results. Patients taking a Biotin dose of up to 5 mg/day should refrain from taking Biotin for 24 hours before sample  collection. Providers may contact their local laboratory for further information.       9:08 AM      Results were successfully communicated with the patient and they acknowledged their understanding.

## 2024-01-18 PROCEDURE — RXMED WILLOW AMBULATORY MEDICATION CHARGE

## 2024-01-20 PROCEDURE — RXMED WILLOW AMBULATORY MEDICATION CHARGE

## 2024-01-22 NOTE — TELEPHONE ENCOUNTER
Result Communication    Resulted Orders   Basic Metabolic Panel   Result Value Ref Range    Glucose 81 74 - 99 mg/dL    Sodium 143 136 - 145 mmol/L    Potassium 4.0 3.5 - 5.3 mmol/L    Chloride 108 (H) 98 - 107 mmol/L    Bicarbonate 26 21 - 32 mmol/L    Anion Gap 13 10 - 20 mmol/L    Urea Nitrogen 15 6 - 23 mg/dL    Creatinine 1.06 (H) 0.50 - 1.05 mg/dL    eGFR 57 (L) >60 mL/min/1.73m*2      Comment:      Calculations of estimated GFR are performed using the 2021 CKD-EPI Study Refit equation without the race variable for the IDMS-Traceable creatinine methods.  https://jasn.asnjournals.org/content/early/2021/09/22/ASN.6021588277    Calcium 9.6 8.6 - 10.6 mg/dL   CBC   Result Value Ref Range    WBC 7.4 4.4 - 11.3 x10*3/uL    nRBC 0.0 0.0 - 0.0 /100 WBCs    RBC 5.17 4.00 - 5.20 x10*6/uL    Hemoglobin 14.2 12.0 - 16.0 g/dL    Hematocrit 45.5 36.0 - 46.0 %    MCV 88 80 - 100 fL    MCH 27.5 26.0 - 34.0 pg    MCHC 31.2 (L) 32.0 - 36.0 g/dL    RDW 13.8 11.5 - 14.5 %    Platelets 244 150 - 450 x10*3/uL   B-Type Natriuretic Peptide   Result Value Ref Range    BNP 66 0 - 99 pg/mL    Narrative       <100 pg/mL - Heart failure unlikely  100-299 pg/mL - Intermediate probability of acute heart                  failure exacerbation. Correlate with clinical                  context and patient history.    >=300 pg/mL - Heart Failure likely. Correlate with clinical                  context and patient history.     Biotin interference may cause falsely decreased results. Patients taking a Biotin dose of up to 5 mg/day should refrain from taking Biotin for 24 hours before sample  collection. Providers may contact their local laboratory for further information.       9:36 AM      Results were successfully communicated with the patient and they acknowledged their understanding.

## 2024-01-23 ENCOUNTER — HOSPITAL ENCOUNTER (EMERGENCY)
Facility: HOSPITAL | Age: 69
Discharge: HOME | End: 2024-01-24
Attending: STUDENT IN AN ORGANIZED HEALTH CARE EDUCATION/TRAINING PROGRAM
Payer: COMMERCIAL

## 2024-01-23 VITALS
DIASTOLIC BLOOD PRESSURE: 83 MMHG | HEART RATE: 72 BPM | RESPIRATION RATE: 18 BRPM | TEMPERATURE: 96.8 F | OXYGEN SATURATION: 96 % | SYSTOLIC BLOOD PRESSURE: 132 MMHG | HEIGHT: 59 IN | WEIGHT: 120 LBS | BODY MASS INDEX: 24.19 KG/M2

## 2024-01-23 DIAGNOSIS — G56.02 CARPAL TUNNEL SYNDROME OF LEFT WRIST: Primary | ICD-10-CM

## 2024-01-23 PROCEDURE — 99284 EMERGENCY DEPT VISIT MOD MDM: CPT | Performed by: STUDENT IN AN ORGANIZED HEALTH CARE EDUCATION/TRAINING PROGRAM

## 2024-01-23 PROCEDURE — 99283 EMERGENCY DEPT VISIT LOW MDM: CPT | Performed by: STUDENT IN AN ORGANIZED HEALTH CARE EDUCATION/TRAINING PROGRAM

## 2024-01-23 ASSESSMENT — LIFESTYLE VARIABLES
EVER HAD A DRINK FIRST THING IN THE MORNING TO STEADY YOUR NERVES TO GET RID OF A HANGOVER: NO
HAVE YOU EVER FELT YOU SHOULD CUT DOWN ON YOUR DRINKING: NO
REASON UNABLE TO ASSESS: NO
HAVE PEOPLE ANNOYED YOU BY CRITICIZING YOUR DRINKING: NO
EVER FELT BAD OR GUILTY ABOUT YOUR DRINKING: NO

## 2024-01-23 ASSESSMENT — PAIN DESCRIPTION - LOCATION: LOCATION: HAND

## 2024-01-23 ASSESSMENT — PAIN DESCRIPTION - ORIENTATION: ORIENTATION: LEFT

## 2024-01-23 ASSESSMENT — PAIN DESCRIPTION - PAIN TYPE: TYPE: ACUTE PAIN

## 2024-01-23 ASSESSMENT — PAIN SCALES - GENERAL: PAINLEVEL_OUTOF10: 5 - MODERATE PAIN

## 2024-01-23 ASSESSMENT — PAIN DESCRIPTION - DESCRIPTORS: DESCRIPTORS: ACHING

## 2024-01-23 ASSESSMENT — PAIN - FUNCTIONAL ASSESSMENT: PAIN_FUNCTIONAL_ASSESSMENT: 0-10

## 2024-01-24 ENCOUNTER — APPOINTMENT (OUTPATIENT)
Dept: RADIOLOGY | Facility: HOSPITAL | Age: 69
End: 2024-01-24
Payer: COMMERCIAL

## 2024-01-24 ENCOUNTER — HOSPITAL ENCOUNTER (OUTPATIENT)
Dept: CARDIOLOGY | Facility: CLINIC | Age: 69
Discharge: HOME | End: 2024-01-24
Payer: COMMERCIAL

## 2024-01-24 DIAGNOSIS — I42.0 DILATED CARDIOMYOPATHY (MULTI): Primary | ICD-10-CM

## 2024-01-24 DIAGNOSIS — I42.9 CARDIOMYOPATHY, UNSPECIFIED TYPE (MULTI): ICD-10-CM

## 2024-01-24 PROCEDURE — 73110 X-RAY EXAM OF WRIST: CPT | Mod: LEFT SIDE | Performed by: RADIOLOGY

## 2024-01-24 PROCEDURE — 73110 X-RAY EXAM OF WRIST: CPT | Mod: LT

## 2024-01-24 NOTE — ED TRIAGE NOTES
Pt reports that she hit the left side of her chest on something and that is the same side as her defibrillator. Pt reports pain, numbness and tingling in her eft hand after the incident happened

## 2024-01-24 NOTE — ED PROVIDER NOTES
HPI: The patient is a 68-year-old woman with history of hypertension A-fib defibrillator CHF with EF of 20 to 25% hyperlipidemia who presents to the Emergency Department with a chief complaint of left hand numbness.  Patient reports intermittent left hand numbness for the last week.  She reports that she typically notices this at night.  She reports it typically involves the thumb index and middle finger.  She reports no numbness tingling or weakness elsewhere in her body.  Denies any falls or trauma or headache.  She denies any exertional symptoms.  She reports that currently, she has no numbness reports that sometimes when she develops symptoms she has a throbbing pain as well.  On review of systems, patient does report that she had some left-sided chest pain 1 week ago that occurred right after she bumped her AICD on the wall when she was moving between 2 things.  She reports that that has already gone away and denies any ripping or tearing pain shortness of breath swelling her legs recent travel or trauma hemoptysis black or bloody bowel movements or exertional symptoms.  She reports that she did not pain before she bumped into the wall and reports that she had reproducible pain when she palpated the area but this is subsequently resolved.     PAST MEDICAL HISTORY:  as per HPI  ALLERGIES:  as per HPI  MEDICATIONS:  as per HPI  FAMILY HISTORY: as per HPI  SURGICAL HISTORY: as per HPI  SOCIAL HISTORY: as per HPI     PHYSICAL EXAM:  VITAL SIGNS: Nursing notes reviewed.  GENERAL:  Alert and interactive  EYES:   Eyes track.  ENT:  Airway patent.  RESPIRATORY:  Nonlabored breathing.  CARDIOVASCULAR:  [Regular rate.] [Regular rhythm.]  GASTROINTESTINAL:  No distension.  MUSCULOSKELETAL:  No deformity.  Radial pulse 2+ on the left upper extremity.  Good cap refill.  Negative Phalen's test.  Positive Tinel's test  NEUROLOGICAL:  Awake.  Tracks with eyes, PERRL, EOMI, equal sensation in V1-V3, no facial droop, sounds  "equal in both ears, 5/5 w/ strength shoulder raise, tongue protrudes at midline, soft palate raises symmetrically 5/5 strength in UE and LE, no deficit with light touch, normal gait  SKIN:  Dry.     Left HAND:  Thumb IP joint extension \"thumb's up sign\", motor intact  Sensation of dorsal webspace between thumb and index finger, sensory intact  \"Okay sign\" between thumb and index finger tips, motor intact  Can touch tip of thumb to tip of pinky, motor intact  Palmar surface of index finger, sensation intact  Abduction of fingers against resistance, motor intact in  Palmar aspect of pinky fingers, sensation intact  Radial artery and ulnar palpable  Hand and fingers are warm and well perfused, capillary refill less than 2 seconds       MEDICAL DECISION MAKING (MDM):       DIAGNOSTIC STUDIES  Radiology: Left wrist x-ray     EKG  Per my interpretation:  Electrocardiogram ECG  RATE:  [Normal]  RHYTHM: [Sinus]  AXIS: [Normal]  INTERVALS: [Normal]  ST-T WAVE CHANGES: [Normal]  ABNORMALITIES/COMPARISON: Unchanged from most recent EKG in December 20, 2023    SUMMARY:  The patient is admitted to the Emergency Department for evaluation of above. Complete history and physical examination was performed by me.  Patient is a well-appearing woman who presents with symptoms most consistent with carpal tunnel.  No evidence of arterial insufficiency.  Doubt DVT.  Doubt cellulitis.  No evidence of compartment syndrome.  I considered stroke and cervical spine pathology but think this is less likely given its isolated to the left hand in the median nerve distribution she has no other neurologic symptoms.  She did on review of systems report that she had short-lived chest pain 1 week ago but has not had any pain since then seems to be musculoskeletal based on her report.  An EKG was obtained in triage which shows no evidence of cardiac ischemia.  Doubt PE Boerhaave's bleeding ulcer pericarditis myocarditis and pneumothorax.  Patient does " report a history of wrist surgery so I did get an x-ray to look for a lytic lesion or other potential abnormality that could explain her symptoms although my suspicion for this is low.  I suspect she likely has pain at night given that she positions her wrist in a way that flexes the wrist and pinches the median nerve more.  I did give her a prefabricated splint while she was here and encouraged her use until she follows up with hand surgery.    X-ray was reassuring.  The work-up and plan were discussed with the patient/caregiver and questions were answered regarding the ED visit.  Educational materials were provided as well as return precautions including returning for any persisting or worsening symptoms.  Patient was recommended to follow-up with PCP in the next few days in addition to any potential specialists that were discussed.  The patient/family expressed understanding and agreed to the described plan.  Patient was discharged in stable condition.     DIAGNOSIS:    Carpal tunnel     DISPOSITION:    1) discharged  [2) Please see Exit Care and discharge instructions for complete details.]     Arturo Schwab MD  01/24/24 0200

## 2024-01-25 ENCOUNTER — PHARMACY VISIT (OUTPATIENT)
Dept: PHARMACY | Facility: CLINIC | Age: 69
End: 2024-01-25
Payer: COMMERCIAL

## 2024-01-30 ENCOUNTER — OFFICE VISIT (OUTPATIENT)
Dept: ORTHOPEDIC SURGERY | Facility: CLINIC | Age: 69
End: 2024-01-30
Payer: COMMERCIAL

## 2024-01-30 DIAGNOSIS — G56.02 CARPAL TUNNEL SYNDROME OF LEFT WRIST: Primary | ICD-10-CM

## 2024-01-30 PROCEDURE — 1036F TOBACCO NON-USER: CPT | Performed by: STUDENT IN AN ORGANIZED HEALTH CARE EDUCATION/TRAINING PROGRAM

## 2024-01-30 PROCEDURE — 1159F MED LIST DOCD IN RCRD: CPT | Performed by: STUDENT IN AN ORGANIZED HEALTH CARE EDUCATION/TRAINING PROGRAM

## 2024-01-30 PROCEDURE — 99214 OFFICE O/P EST MOD 30 MIN: CPT | Performed by: STUDENT IN AN ORGANIZED HEALTH CARE EDUCATION/TRAINING PROGRAM

## 2024-01-30 ASSESSMENT — PAIN DESCRIPTION - DESCRIPTORS: DESCRIPTORS: NUMBNESS;TINGLING

## 2024-01-30 ASSESSMENT — PAIN - FUNCTIONAL ASSESSMENT: PAIN_FUNCTIONAL_ASSESSMENT: 0-10

## 2024-01-30 NOTE — PROGRESS NOTES
History of Present Illness:  The patient presents today for evaluation of side: left hand pain.  The patient endorses numbness and tingling (predominantly radial three digits).  There is no current neck pain or cervical spine issues.  The patient has tried to the following interventions thus far:  bracing .  She recently was evaluated in the emergency room for carpal tunnel syndrome. The patient notes the pain is worse with activity such as driving or talking on the phone and at night.  The patient denies any recent trauma.  The pain is sharp, electrical in nature.    She wakes up at night with numbness and tingling in her left hand    Past Medical History:   Diagnosis Date    Encounter for general adult medical examination without abnormal findings 01/31/2022    Encounter for initial preventive physical examination covered by Medicare    Otitis media, unspecified, right ear 01/31/2014    Acute right otitis media    Personal history of other diseases of the digestive system 07/21/2013    History of esophageal reflux    Personal history of other diseases of the musculoskeletal system and connective tissue 07/21/2013    History of low back pain    Personal history of other specified conditions 07/21/2013    History of angioedema       Medication Documentation Review Audit       Reviewed by Jyoti Matos MA (Medical Assistant) on 01/30/24 at 1613      Medication Order Taking? Sig Documenting Provider Last Dose Status   acetaminophen (TylenoL) 325 mg tablet [id number] No Take 2 tablets (650 mg) by mouth every 6 hours if needed for mild pain (1 - 3) or fever (temp greater than 38.0 C). Jamee Mccarthy, APRN-CNP Taking Active   alendronate (Fosamax) 70 mg tablet [id number] No TAKE 1 TABLET BY MOUTH EVERY 7 DAYS. TAKE IN THE MORNING WITH A FULL GLASS OF WATER, ON AN EMPTY STOMACH, AND DO NOT TAKE ANYTHING ELSE BY MOUTH OR LIE FOR THE NEXT 30 MINUTES. Tanmay Whitten MD Taking Active   Discontinued 09/12/23 3186    calcium carbonate 215 mg calcium (500 mg) tablet,chewable [id number] No Chew 1 tablet 2 times a day. Tanmay Whitten MD Taking Active   cetirizine (ZyrTEC) 10 mg tablet [id number] No Take 1 tablet (10 mg) by mouth once daily as needed. Historical Provider, MD Taking Active   cholecalciferol, vitamin D3, 10 mcg (400 unit) capsule [id number] No TAKE 1 CAPSULE Daily Historical Provider, MD Taking Active   ciclopirox (Penlac) 8 % solution [id number] No APPLY TO AFFECTED TOENAILS ONCE DAILY.   Patient taking differently: if needed.    Ana Patiño DPM Taking Active   diclofenac sodium (Voltaren) 1 % gel gel [id number] No Apply sparingly to affected areas three times a day as needed for pain Historical MD Stanley Taking Active   empagliflozin (Jardiance) 10 mg [id number] No TAKE 1 TABLET BY MOUTH ONCE DAILY Tanmay Whitten MD Taking Active   famotidine (Pepcid) 20 mg tablet [id number]  Take 1 tablet (20 mg) by mouth once daily. Iris Beltran, DERRICK-CNP  Active   fluticasone (Flonase) 50 mcg/actuation nasal spray [id number] No USE 1 TO 2 SPRAYS IN EACH NOSTRIL ONCE DAILY. Historical Provider, MD Not Taking Active   hydrALAZINE (Apresoline) 10 mg tablet [id number] No TAKE 1 TABLET BY MOUTH EVERY 8 HOURS Tanmay Wihtten MD Taking Active   isosorbide dinitrate (Isordil) 20 mg tablet [id number] No TAKE 1 TABLET BY MOUTH THREE TIMES A DAY Olga Newsome MD Taking Active   lidocaine (Xylocaine) 5 % cream cream [id number] No Apply to area of pain once daily as needed. Historical Provider, MD Taking Active   methocarbamol (Robaxin) 500 mg tablet [id number] No Take 1-2 tablets as needed at bedtime for muscle tightness, spasms Historical MD Stanley Taking Active   metoprolol succinate XL (Toprol-XL) 25 mg 24 hr tablet [id number] No Take 1 tablet (25 mg) by mouth once daily. Historical MD Stanley Taking Active   NaCl-NaHCO3-hyaluron sod-aloe (NasogeL) 0.9 % spray gel [id number] No USE AS DIRECTED. Red Angel MD  Taking Active   psyllium husk, aspartame, (Metamucil Sugar-Free, aspart,) 3.4 gram/5.8 gram powder [id number] No Use as directed 1-3 times daily as needed to maintain regular bowel movements. Historical Provider, MD Not Taking Active   sodium chloride-aloe vera spray,non-aerosol [id number] No 1 spray each nostril at bedtime Historical Provider, MD Not Taking Active   spironolactone (Aldactone) 25 mg tablet [id number] No TAKE 1 TABLET (25 MG) BY MOUTH ONCE DAILY. Juan David Cortez MD Taking Active                    Allergies   Allergen Reactions    Cephalexin Unknown    Lisinopril Angioedema       Social History     Socioeconomic History    Marital status: Single     Spouse name: Not on file    Number of children: Not on file    Years of education: Not on file    Highest education level: Not on file   Occupational History    Not on file   Tobacco Use    Smoking status: Former     Types: Cigarettes     Passive exposure: Past    Smokeless tobacco: Never   Substance and Sexual Activity    Alcohol use: Never    Drug use: Never    Sexual activity: Not on file   Other Topics Concern    Not on file   Social History Narrative    Not on file     Social Determinants of Health     Financial Resource Strain: Not on file   Food Insecurity: Not on file   Transportation Needs: Not on file   Physical Activity: Not on file   Stress: Not on file   Social Connections: Not on file   Intimate Partner Violence: Not on file   Housing Stability: Not on file       Past Surgical History:   Procedure Laterality Date    COLONOSCOPY  08/23/2013    Colonoscopy (Fiberoptic)    CT ANGIO CORONARY ART WITH HEARTFLOW IF SCORE >30%  11/20/2018    CT HEART CORONARY ANGIOGRAM 11/20/2018 [facility] [place] [facility]    CT ANGIO NECK  1/29/2022    CT NECK ANGIO W AND WO IV CONTRAST 1/29/2022 [facility] CLINICAL LEGACY    OTHER SURGICAL HISTORY  02/08/2021    Pacemaker insertion    OTHER SURGICAL HISTORY  11/08/2018    Umbilical hernia repair    OTHER SURGICAL HISTORY   12/05/2013    Wrist Surgery          No History of diabetes or hypothyroidism.     Review of Systems   GENERAL: Negative for malaise, significant weight loss, fever  MUSCULOSKELETAL: see HPI  NEURO:  Negative     Physical Examination:  No tenderness to palpation cervical spine  Good ROM of neck  Negative Spurlings test     side: left wrist/hand:  No obvious swelling or masses  Thenar eminence muscle mass: thenar: mild atrophy note, fasciulations in thenar musculature  No atrophy noted of hypothenar eminence   Positive Tinel's at carpal tunnel  + Median nerve compression test  + Phalens  Negative tinels at guyons and cubital tunnel  Negative elbow flexion test  Decreased sensation to light touch  in median nerve distribution  Motor exam within normal limits  Radial pulse palpable  Good capillary refill       Additional studies: XR of left wrist was reviewed in office today 1/24/2024     Assessment:  Patient with side: left carpal tunnel syndrome     Plan:  We reviewed the pathogenesis and treatment of carpal tunnel syndrome today in detail. We reviewed the role of night splints in the treatment of carpal tunnel syndrome. We did review the role of a carpal tunnel corticosteroid injection, and should symptoms worsen this may be considered in the future for both diagnostic and therapeutic purposes. We discussed that carpal tunnel corticosteroid injections are generally not curative, but can provide symptomatic relief for 6 weeks - 6 months. We also reviewed the role of electrodiagnostic studies including EMG/NCS of the extremity to further evaluate possible anatomic sites of compression of the nerve peripherally or more proximally in the cervical spine, as well as providing objective measurements of nerve conduction velocity, distal motor and sensory latency, and motor and sensory amplitude. We discussed that electrodiagnostic studies are not necessary to make the diagnosis of carpal tunnel syndrome, but can provide  confirmatory information as well as baseline values for the above noted measures. We discussed that the false negative rate of electrodiagnostic studies in the setting of CTS is approximately 10%. The patient is understanding of these recommendations. We discussed that should their symptoms worsen we may proceed with the above course of treatment. Ultimately if their symptoms were to worsen or not be sufficiently improved with any conservative treatment modalities, or if concern existed for severe compression of the nerve and potential irreversible damage to the sensory organs or thenar motor function, we may consider an open or endoscopic carpal tunnel release.     CTS-6 Score     Numbness in predominantly or exclusively in median nerve distribution (3.5pts): 3.5     Nocturnal symptoms (4 pts): 4     Thenar atrophy or weakness (5 pts): 5     Positive Phalen test (5 pts): 5     2pd > 5mm (4.5 pts): 0     Positive Tinel sign (4 pts): 4     Total score: 21.5/26 (>/= 12 pts equates to approximately 80% probability of CTS diagnosis)    Pt elected for Left carpal tunnel injection and she would like to delay surgery to a more convenient time    Hand / UE Inj/Asp: L carpal tunnel for carpal tunnel syndrome on 3/18/2024 11:33 AM  Indications: therapeutic  Details: 25 G needle, volar approach  Medications: 6 mg betamethasone acet,sod phos 6 mg/mL; 0.5 mL lidocaine 10 mg/mL (1 %)  Outcome: tolerated well, no immediate complications  Procedure, treatment alternatives, risks and benefits explained, specific risks discussed. Immediately prior to procedure a time out was called to verify the correct patient, procedure, equipment, support staff and site/side marked as required. Patient was prepped and draped in the usual sterile fashion.

## 2024-02-12 ENCOUNTER — TELEPHONE (OUTPATIENT)
Dept: PRIMARY CARE | Facility: CLINIC | Age: 69
End: 2024-02-12

## 2024-02-13 DIAGNOSIS — M81.0 OSTEOPOROSIS WITHOUT CURRENT PATHOLOGICAL FRACTURE, UNSPECIFIED OSTEOPOROSIS TYPE: ICD-10-CM

## 2024-02-15 PROCEDURE — RXMED WILLOW AMBULATORY MEDICATION CHARGE

## 2024-02-19 PROCEDURE — RXMED WILLOW AMBULATORY MEDICATION CHARGE

## 2024-02-21 DIAGNOSIS — I50.9 HEART FAILURE, UNSPECIFIED HF CHRONICITY, UNSPECIFIED HEART FAILURE TYPE (MULTI): Primary | ICD-10-CM

## 2024-02-22 ENCOUNTER — APPOINTMENT (OUTPATIENT)
Dept: CARDIOLOGY | Facility: CLINIC | Age: 69
End: 2024-02-22
Payer: COMMERCIAL

## 2024-02-22 ENCOUNTER — HOSPITAL ENCOUNTER (OUTPATIENT)
Dept: CARDIOLOGY | Facility: CLINIC | Age: 69
Discharge: HOME | End: 2024-02-22
Payer: COMMERCIAL

## 2024-02-22 DIAGNOSIS — I42.9 CARDIOMYOPATHY, UNSPECIFIED TYPE (MULTI): ICD-10-CM

## 2024-02-22 PROCEDURE — 93280 PM DEVICE PROGR EVAL DUAL: CPT

## 2024-02-22 PROCEDURE — 93280 PM DEVICE PROGR EVAL DUAL: CPT | Performed by: INTERNAL MEDICINE

## 2024-02-22 PROCEDURE — 93290 INTERROG DEV EVAL ICPMS IP: CPT | Performed by: INTERNAL MEDICINE

## 2024-02-24 ENCOUNTER — PHARMACY VISIT (OUTPATIENT)
Dept: PHARMACY | Facility: CLINIC | Age: 69
End: 2024-02-24
Payer: COMMERCIAL

## 2024-02-27 PROCEDURE — RXMED WILLOW AMBULATORY MEDICATION CHARGE

## 2024-03-02 ENCOUNTER — HOSPITAL ENCOUNTER (OUTPATIENT)
Facility: HOSPITAL | Age: 69
Setting detail: OBSERVATION
Discharge: HOME | End: 2024-03-03
Attending: STUDENT IN AN ORGANIZED HEALTH CARE EDUCATION/TRAINING PROGRAM | Admitting: INTERNAL MEDICINE
Payer: COMMERCIAL

## 2024-03-02 ENCOUNTER — CLINICAL SUPPORT (OUTPATIENT)
Dept: EMERGENCY MEDICINE | Facility: HOSPITAL | Age: 69
End: 2024-03-02
Payer: COMMERCIAL

## 2024-03-02 ENCOUNTER — APPOINTMENT (OUTPATIENT)
Dept: RADIOLOGY | Facility: HOSPITAL | Age: 69
End: 2024-03-02
Payer: COMMERCIAL

## 2024-03-02 DIAGNOSIS — R07.89 OTHER CHEST PAIN: Primary | ICD-10-CM

## 2024-03-02 LAB
ATRIAL RATE: 67 BPM
P AXIS: 51 DEGREES
P OFFSET: 196 MS
P ONSET: 145 MS
PR INTERVAL: 166 MS
Q ONSET: 216 MS
QRS COUNT: 11 BEATS
QRS DURATION: 90 MS
QT INTERVAL: 458 MS
QTC CALCULATION(BAZETT): 483 MS
QTC FREDERICIA: 475 MS
R AXIS: 39 DEGREES
T AXIS: 83 DEGREES
T OFFSET: 445 MS
VENTRICULAR RATE: 67 BPM

## 2024-03-02 PROCEDURE — 71046 X-RAY EXAM CHEST 2 VIEWS: CPT | Mod: FOREIGN READ | Performed by: RADIOLOGY

## 2024-03-02 PROCEDURE — 71046 X-RAY EXAM CHEST 2 VIEWS: CPT

## 2024-03-02 PROCEDURE — 93005 ELECTROCARDIOGRAM TRACING: CPT

## 2024-03-02 PROCEDURE — 99285 EMERGENCY DEPT VISIT HI MDM: CPT | Mod: 25

## 2024-03-02 PROCEDURE — 99285 EMERGENCY DEPT VISIT HI MDM: CPT | Performed by: STUDENT IN AN ORGANIZED HEALTH CARE EDUCATION/TRAINING PROGRAM

## 2024-03-02 PROCEDURE — 93010 ELECTROCARDIOGRAM REPORT: CPT | Performed by: STUDENT IN AN ORGANIZED HEALTH CARE EDUCATION/TRAINING PROGRAM

## 2024-03-02 RX ORDER — KETOROLAC TROMETHAMINE 15 MG/ML
15 INJECTION, SOLUTION INTRAMUSCULAR; INTRAVENOUS ONCE
Status: COMPLETED | OUTPATIENT
Start: 2024-03-02 | End: 2024-03-03

## 2024-03-02 ASSESSMENT — PAIN SCALES - GENERAL: PAINLEVEL_OUTOF10: 3

## 2024-03-02 ASSESSMENT — PAIN - FUNCTIONAL ASSESSMENT: PAIN_FUNCTIONAL_ASSESSMENT: 0-10

## 2024-03-02 ASSESSMENT — LIFESTYLE VARIABLES
HAVE YOU EVER FELT YOU SHOULD CUT DOWN ON YOUR DRINKING: NO
EVER FELT BAD OR GUILTY ABOUT YOUR DRINKING: NO
HAVE PEOPLE ANNOYED YOU BY CRITICIZING YOUR DRINKING: NO
EVER HAD A DRINK FIRST THING IN THE MORNING TO STEADY YOUR NERVES TO GET RID OF A HANGOVER: NO

## 2024-03-03 ENCOUNTER — CLINICAL SUPPORT (OUTPATIENT)
Dept: EMERGENCY MEDICINE | Facility: HOSPITAL | Age: 69
End: 2024-03-03
Payer: COMMERCIAL

## 2024-03-03 VITALS
OXYGEN SATURATION: 94 % | DIASTOLIC BLOOD PRESSURE: 72 MMHG | HEIGHT: 59 IN | RESPIRATION RATE: 20 BRPM | BODY MASS INDEX: 24.18 KG/M2 | SYSTOLIC BLOOD PRESSURE: 118 MMHG | TEMPERATURE: 97.9 F | WEIGHT: 119.93 LBS | HEART RATE: 52 BPM

## 2024-03-03 PROBLEM — R07.9 CHEST PAIN: Status: ACTIVE | Noted: 2024-03-03

## 2024-03-03 LAB
ALBUMIN SERPL BCP-MCNC: 4.6 G/DL (ref 3.4–5)
ALP SERPL-CCNC: 84 U/L (ref 33–136)
ALT SERPL W P-5'-P-CCNC: 35 U/L (ref 7–45)
ANION GAP SERPL CALC-SCNC: 14 MMOL/L (ref 10–20)
AST SERPL W P-5'-P-CCNC: 36 U/L (ref 9–39)
ATRIAL RATE: 67 BPM
BASOPHILS # BLD AUTO: 0.03 X10*3/UL (ref 0–0.1)
BASOPHILS NFR BLD AUTO: 0.4 %
BILIRUB SERPL-MCNC: 0.4 MG/DL (ref 0–1.2)
BNP SERPL-MCNC: 48 PG/ML (ref 0–99)
BUN SERPL-MCNC: 14 MG/DL (ref 6–23)
CALCIUM SERPL-MCNC: 9.8 MG/DL (ref 8.6–10.6)
CARDIAC TROPONIN I PNL SERPL HS: 13 NG/L (ref 0–34)
CARDIAC TROPONIN I PNL SERPL HS: 9 NG/L (ref 0–34)
CHLORIDE SERPL-SCNC: 103 MMOL/L (ref 98–107)
CO2 SERPL-SCNC: 27 MMOL/L (ref 21–32)
CREAT SERPL-MCNC: 1.11 MG/DL (ref 0.5–1.05)
EGFRCR SERPLBLD CKD-EPI 2021: 54 ML/MIN/1.73M*2
EOSINOPHIL # BLD AUTO: 0.1 X10*3/UL (ref 0–0.7)
EOSINOPHIL NFR BLD AUTO: 1.4 %
ERYTHROCYTE [DISTWIDTH] IN BLOOD BY AUTOMATED COUNT: 13.2 % (ref 11.5–14.5)
FLUAV RNA RESP QL NAA+PROBE: NOT DETECTED
FLUBV RNA RESP QL NAA+PROBE: NOT DETECTED
GLUCOSE SERPL-MCNC: 91 MG/DL (ref 74–99)
HCT VFR BLD AUTO: 48.7 % (ref 36–46)
HGB BLD-MCNC: 16.3 G/DL (ref 12–16)
HOLD SPECIMEN: NORMAL
IMM GRANULOCYTES # BLD AUTO: 0.02 X10*3/UL (ref 0–0.7)
IMM GRANULOCYTES NFR BLD AUTO: 0.3 % (ref 0–0.9)
LIPASE SERPL-CCNC: 35 U/L (ref 9–82)
LYMPHOCYTES # BLD AUTO: 2.92 X10*3/UL (ref 1.2–4.8)
LYMPHOCYTES NFR BLD AUTO: 40.8 %
MCH RBC QN AUTO: 28.1 PG (ref 26–34)
MCHC RBC AUTO-ENTMCNC: 33.5 G/DL (ref 32–36)
MCV RBC AUTO: 84 FL (ref 80–100)
MONOCYTES # BLD AUTO: 0.55 X10*3/UL (ref 0.1–1)
MONOCYTES NFR BLD AUTO: 7.7 %
NEUTROPHILS # BLD AUTO: 3.54 X10*3/UL (ref 1.2–7.7)
NEUTROPHILS NFR BLD AUTO: 49.4 %
NRBC BLD-RTO: 0 /100 WBCS (ref 0–0)
P AXIS: 36 DEGREES
P OFFSET: 193 MS
P ONSET: 145 MS
PLATELET # BLD AUTO: 274 X10*3/UL (ref 150–450)
POTASSIUM SERPL-SCNC: 4.1 MMOL/L (ref 3.5–5.3)
PR INTERVAL: 162 MS
PROT SERPL-MCNC: 8.4 G/DL (ref 6.4–8.2)
Q ONSET: 226 MS
QRS COUNT: 11 BEATS
QRS DURATION: 86 MS
QT INTERVAL: 452 MS
QTC CALCULATION(BAZETT): 477 MS
QTC FREDERICIA: 469 MS
R AXIS: -8 DEGREES
RBC # BLD AUTO: 5.8 X10*6/UL (ref 4–5.2)
SARS-COV-2 RNA RESP QL NAA+PROBE: NOT DETECTED
SODIUM SERPL-SCNC: 140 MMOL/L (ref 136–145)
T AXIS: 43 DEGREES
T OFFSET: 452 MS
VENTRICULAR RATE: 67 BPM
WBC # BLD AUTO: 7.2 X10*3/UL (ref 4.4–11.3)

## 2024-03-03 PROCEDURE — 80053 COMPREHEN METABOLIC PANEL: CPT

## 2024-03-03 PROCEDURE — 2500000004 HC RX 250 GENERAL PHARMACY W/ HCPCS (ALT 636 FOR OP/ED)

## 2024-03-03 PROCEDURE — 85025 COMPLETE CBC W/AUTO DIFF WBC: CPT

## 2024-03-03 PROCEDURE — 99223 1ST HOSP IP/OBS HIGH 75: CPT

## 2024-03-03 PROCEDURE — 83690 ASSAY OF LIPASE: CPT | Performed by: STUDENT IN AN ORGANIZED HEALTH CARE EDUCATION/TRAINING PROGRAM

## 2024-03-03 PROCEDURE — 2500000001 HC RX 250 WO HCPCS SELF ADMINISTERED DRUGS (ALT 637 FOR MEDICARE OP): Performed by: STUDENT IN AN ORGANIZED HEALTH CARE EDUCATION/TRAINING PROGRAM

## 2024-03-03 PROCEDURE — 36415 COLL VENOUS BLD VENIPUNCTURE: CPT

## 2024-03-03 PROCEDURE — 2500000002 HC RX 250 W HCPCS SELF ADMINISTERED DRUGS (ALT 637 FOR MEDICARE OP, ALT 636 FOR OP/ED)

## 2024-03-03 PROCEDURE — 83880 ASSAY OF NATRIURETIC PEPTIDE: CPT

## 2024-03-03 PROCEDURE — 84484 ASSAY OF TROPONIN QUANT: CPT

## 2024-03-03 PROCEDURE — RXMED WILLOW AMBULATORY MEDICATION CHARGE

## 2024-03-03 PROCEDURE — G0378 HOSPITAL OBSERVATION PER HR: HCPCS

## 2024-03-03 PROCEDURE — 2500000001 HC RX 250 WO HCPCS SELF ADMINISTERED DRUGS (ALT 637 FOR MEDICARE OP)

## 2024-03-03 PROCEDURE — 96374 THER/PROPH/DIAG INJ IV PUSH: CPT

## 2024-03-03 PROCEDURE — 87636 SARSCOV2 & INF A&B AMP PRB: CPT

## 2024-03-03 RX ORDER — ACETAMINOPHEN 325 MG/1
650 TABLET ORAL EVERY 6 HOURS PRN
Status: DISCONTINUED | OUTPATIENT
Start: 2024-03-03 | End: 2024-03-03 | Stop reason: HOSPADM

## 2024-03-03 RX ORDER — ALUMINUM HYDROXIDE, MAGNESIUM HYDROXIDE, AND SIMETHICONE 1200; 120; 1200 MG/30ML; MG/30ML; MG/30ML
20 SUSPENSION ORAL 4 TIMES DAILY PRN
Status: DISCONTINUED | OUTPATIENT
Start: 2024-03-03 | End: 2024-03-03 | Stop reason: HOSPADM

## 2024-03-03 RX ORDER — ALUMINUM HYDROXIDE, MAGNESIUM HYDROXIDE, AND SIMETHICONE 1200; 120; 1200 MG/30ML; MG/30ML; MG/30ML
30 SUSPENSION ORAL ONCE
Status: COMPLETED | OUTPATIENT
Start: 2024-03-03 | End: 2024-03-03

## 2024-03-03 RX ORDER — ISOSORBIDE DINITRATE 10 MG/1
20 TABLET ORAL 3 TIMES DAILY
Status: DISCONTINUED | OUTPATIENT
Start: 2024-03-03 | End: 2024-03-03 | Stop reason: HOSPADM

## 2024-03-03 RX ORDER — ENOXAPARIN SODIUM 100 MG/ML
40 INJECTION SUBCUTANEOUS EVERY 24 HOURS
Status: DISCONTINUED | OUTPATIENT
Start: 2024-03-03 | End: 2024-03-03 | Stop reason: HOSPADM

## 2024-03-03 RX ORDER — POLYETHYLENE GLYCOL 3350 17 G/17G
17 POWDER, FOR SOLUTION ORAL DAILY PRN
Status: DISCONTINUED | OUTPATIENT
Start: 2024-03-03 | End: 2024-03-03 | Stop reason: HOSPADM

## 2024-03-03 RX ORDER — ALUMINUM HYDROXIDE, MAGNESIUM HYDROXIDE, AND SIMETHICONE 1200; 120; 1200 MG/30ML; MG/30ML; MG/30ML
20 SUSPENSION ORAL 4 TIMES DAILY PRN
Qty: 355 ML | Refills: 0 | Status: SHIPPED | OUTPATIENT
Start: 2024-03-03 | End: 2024-03-17

## 2024-03-03 RX ORDER — SPIRONOLACTONE 25 MG/1
25 TABLET ORAL
Status: DISCONTINUED | OUTPATIENT
Start: 2024-03-03 | End: 2024-03-03 | Stop reason: HOSPADM

## 2024-03-03 RX ORDER — METOPROLOL SUCCINATE 25 MG/1
25 TABLET, EXTENDED RELEASE ORAL DAILY
Status: DISCONTINUED | OUTPATIENT
Start: 2024-03-03 | End: 2024-03-03 | Stop reason: HOSPADM

## 2024-03-03 RX ADMIN — SPIRONOLACTONE 25 MG: 25 TABLET, FILM COATED ORAL at 09:04

## 2024-03-03 RX ADMIN — ENOXAPARIN SODIUM 40 MG: 100 INJECTION SUBCUTANEOUS at 09:04

## 2024-03-03 RX ADMIN — METOPROLOL SUCCINATE 25 MG: 25 TABLET, EXTENDED RELEASE ORAL at 09:04

## 2024-03-03 RX ADMIN — ISOSORBIDE DINITRATE 20 MG: 10 TABLET ORAL at 14:53

## 2024-03-03 RX ADMIN — EMPAGLIFLOZIN 10 MG: 10 TABLET, FILM COATED ORAL at 09:04

## 2024-03-03 RX ADMIN — ALUMINUM HYDROXIDE, MAGNESIUM HYDROXIDE, AND SIMETHICONE 30 ML: 200; 200; 20 SUSPENSION ORAL at 00:17

## 2024-03-03 RX ADMIN — KETOROLAC TROMETHAMINE 15 MG: 15 INJECTION, SOLUTION INTRAMUSCULAR; INTRAVENOUS at 00:17

## 2024-03-03 RX ADMIN — ISOSORBIDE DINITRATE 20 MG: 10 TABLET ORAL at 09:04

## 2024-03-03 RX ADMIN — ACETAMINOPHEN 650 MG: 325 TABLET ORAL at 09:04

## 2024-03-03 RX ADMIN — ALUMINUM HYDROXIDE, MAGNESIUM HYDROXIDE, AND SIMETHICONE 20 ML: 200; 200; 20 SUSPENSION ORAL at 09:04

## 2024-03-03 RX ADMIN — ALUMINUM HYDROXIDE, MAGNESIUM HYDROXIDE, AND SIMETHICONE 20 ML: 200; 200; 20 SUSPENSION ORAL at 14:52

## 2024-03-03 SDOH — SOCIAL STABILITY: SOCIAL INSECURITY: HAVE YOU HAD THOUGHTS OF HARMING ANYONE ELSE?: NO

## 2024-03-03 SDOH — SOCIAL STABILITY: SOCIAL INSECURITY: ARE YOU OR HAVE YOU BEEN THREATENED OR ABUSED PHYSICALLY, EMOTIONALLY, OR SEXUALLY BY ANYONE?: NO

## 2024-03-03 SDOH — SOCIAL STABILITY: SOCIAL INSECURITY: DO YOU FEEL ANYONE HAS EXPLOITED OR TAKEN ADVANTAGE OF YOU FINANCIALLY OR OF YOUR PERSONAL PROPERTY?: NO

## 2024-03-03 SDOH — SOCIAL STABILITY: SOCIAL INSECURITY: DO YOU FEEL UNSAFE GOING BACK TO THE PLACE WHERE YOU ARE LIVING?: NO

## 2024-03-03 SDOH — SOCIAL STABILITY: SOCIAL INSECURITY: WERE YOU ABLE TO COMPLETE ALL THE BEHAVIORAL HEALTH SCREENINGS?: YES

## 2024-03-03 SDOH — SOCIAL STABILITY: SOCIAL INSECURITY: DOES ANYONE TRY TO KEEP YOU FROM HAVING/CONTACTING OTHER FRIENDS OR DOING THINGS OUTSIDE YOUR HOME?: NO

## 2024-03-03 SDOH — SOCIAL STABILITY: SOCIAL INSECURITY: ABUSE: ADULT

## 2024-03-03 SDOH — SOCIAL STABILITY: SOCIAL INSECURITY: HAS ANYONE EVER THREATENED TO HURT YOUR FAMILY OR YOUR PETS?: NO

## 2024-03-03 SDOH — SOCIAL STABILITY: SOCIAL INSECURITY: ARE THERE ANY APPARENT SIGNS OF INJURIES/BEHAVIORS THAT COULD BE RELATED TO ABUSE/NEGLECT?: NO

## 2024-03-03 ASSESSMENT — COGNITIVE AND FUNCTIONAL STATUS - GENERAL
PATIENT BASELINE BEDBOUND: NO
DAILY ACTIVITIY SCORE: 24
MOBILITY SCORE: 24
DAILY ACTIVITIY SCORE: 24
MOBILITY SCORE: 24

## 2024-03-03 ASSESSMENT — ACTIVITIES OF DAILY LIVING (ADL)
PATIENT'S MEMORY ADEQUATE TO SAFELY COMPLETE DAILY ACTIVITIES?: YES
LACK_OF_TRANSPORTATION: NO
WALKS IN HOME: INDEPENDENT
ADEQUATE_TO_COMPLETE_ADL: YES
DRESSING YOURSELF: INDEPENDENT
GROOMING: INDEPENDENT
JUDGMENT_ADEQUATE_SAFELY_COMPLETE_DAILY_ACTIVITIES: YES
BATHING: INDEPENDENT
TOILETING: INDEPENDENT
HEARING - RIGHT EAR: FUNCTIONAL
FEEDING YOURSELF: INDEPENDENT
HEARING - LEFT EAR: FUNCTIONAL

## 2024-03-03 ASSESSMENT — PATIENT HEALTH QUESTIONNAIRE - PHQ9
2. FEELING DOWN, DEPRESSED OR HOPELESS: NOT AT ALL
1. LITTLE INTEREST OR PLEASURE IN DOING THINGS: NOT AT ALL
SUM OF ALL RESPONSES TO PHQ9 QUESTIONS 1 & 2: 0

## 2024-03-03 ASSESSMENT — PAIN SCALES - GENERAL
PAINLEVEL_OUTOF10: 0 - NO PAIN
PAINLEVEL_OUTOF10: 2
PAINLEVEL_OUTOF10: 0 - NO PAIN
PAINLEVEL_OUTOF10: 0 - NO PAIN

## 2024-03-03 ASSESSMENT — ENCOUNTER SYMPTOMS
ABDOMINAL PAIN: 1
COUGH: 0
CHILLS: 0
FEVER: 0
BLOOD IN STOOL: 0
DIFFICULTY URINATING: 0
DYSURIA: 0
CONSTIPATION: 0
CHEST TIGHTNESS: 0
SHORTNESS OF BREATH: 0
DIARRHEA: 0
ANAL BLEEDING: 0
NAUSEA: 0

## 2024-03-03 ASSESSMENT — HEART SCORE
TROPONIN: LESS THAN OR EQUAL TO NORMAL LIMIT
RISK FACTORS: 1-2 RISK FACTORS
HISTORY: SLIGHTLY SUSPICIOUS
HEART SCORE: 3
AGE: 65+
ECG: NORMAL

## 2024-03-03 ASSESSMENT — LIFESTYLE VARIABLES
HOW OFTEN DO YOU HAVE A DRINK CONTAINING ALCOHOL: MONTHLY OR LESS
SKIP TO QUESTIONS 9-10: 1
HOW OFTEN DO YOU HAVE 6 OR MORE DRINKS ON ONE OCCASION: NEVER
AUDIT-C TOTAL SCORE: 1
AUDIT-C TOTAL SCORE: 1
HOW MANY STANDARD DRINKS CONTAINING ALCOHOL DO YOU HAVE ON A TYPICAL DAY: 1 OR 2

## 2024-03-03 ASSESSMENT — PAIN - FUNCTIONAL ASSESSMENT
PAIN_FUNCTIONAL_ASSESSMENT: 0-10
PAIN_FUNCTIONAL_ASSESSMENT: 0-10

## 2024-03-03 NOTE — HOSPITAL COURSE
Ms. Sandy Smith is a 69 yo female with PMH significant for NICM (EF 50-55% 08/2023) s/p RFA (10/2020) and ICD (02/2021), HTN, chronic HCV who presents after 1 week onset of epigastric pain.  Differential includes GERD (burning sensation, with some throat pain) vs PUD (recent ibuprofen use, relation with eating) vs MSK pain. Concern for cardiac related pain is very low at this time. Concern for cardiac etiology of pain is very low at this time.     In ED, pt was HDS on arrival with workup largely negative. Troponin was 13 -> 9. BNP 48. Lipase 35. CMP and CBC unremarkable. CXR with no acute process. Pt was given Mylanta and admitted for further cardiac workup. Upon arrival to the floor patient states that after taking Tylenol and Mylanta, her pain is completely resolved.  Pt was discharged on 3/3/24 to home with outpt follow up and with a prescription of Mylanta.

## 2024-03-03 NOTE — CARE PLAN
The patient's goals for the shift include      The clinical goals for the shift include pt will remain HDS      Problem: Pain  Goal: My pain/discomfort is manageable  Outcome: Progressing     Problem: Safety  Goal: Patient will be injury free during hospitalization  Outcome: Progressing  Goal: I will remain free of falls  Outcome: Progressing     Problem: Daily Care  Goal: Daily care needs are met  Outcome: Progressing     Problem: Psychosocial Needs  Goal: Demonstrates ability to cope with hospitalization/illness  Outcome: Progressing  Goal: Collaborate with me, my family, and caregiver to identify my specific goals  Outcome: Progressing  Flowsheets (Taken 3/3/2024 2014)  Cultural Requests During Hospitalization: none  Spiritual Requests During Hospitalization: none     Problem: Discharge Barriers  Goal: My discharge needs are met  Outcome: Progressing

## 2024-03-03 NOTE — H&P
History Of Present Illness  Sandy Smith is a 68 y.o. female with PMHx of NICM (EF 50-55% 08/2023) s/p RFA (10/2020) and ICD (02/2021), HTN, chronic HCV presenting with epigastric pain.    Patient was in her usual state of health until roughly 1 week ago when she began noticing a pain in her epigastrium and lower sternum that radiated to the center of her abdomen.  She describes this pain as a 'burning', grades this pain as a 2 out of 10, and states that it was not constant.  She denies any provocative or palliating factors associated with this pain.  She is unclear if there is any temporal relation with regards to what she eats for this pain, but on further questioning does mention that when she eats and lies down the pain tends to occur sooner. She also says that occasionally when she provides some pressure to her epigastrium, it feels a little better. She says that this pain got slightly worse yesterday, prompting her to come into the hospital and be evaluated further.  She has not tried any Tylenol or any antacids for relief of this pain.  She says that when she was in the ED she been given an antacid that relieved her pain.  She denies any fevers, chills, shortness of breath, chest pain, constipation, diarrhea, nausea, vomiting, blood in urine, dysuria, difficulty urinating, blood in stool, or any other symptoms associated with this pain.  She does note that she had recently been given a prescription of ibuprofen for management of her TMJ associated pain, but had only taken some of it as it was causing her abdominal pain (which she is unable to describe if it is similar to her current pain).    In ED, pt was HDS on arrival with workup largely negative. Troponin was 13 -> 9. BNP 48. Lipase 35. CMP and CBC unremarkable. CXR with no acute process. Pt was given Mylanta and admitted for further cardiac workup. Upon arrival to the floor patient states that after taking Tylenol and Mylanta, her pain is completely  resolved.        Past Medical History  Past Medical History:   Diagnosis Date    Encounter for general adult medical examination without abnormal findings 01/31/2022    Encounter for initial preventive physical examination covered by Medicare    Otitis media, unspecified, right ear 01/31/2014    Acute right otitis media    Personal history of other diseases of the digestive system 07/21/2013    History of esophageal reflux    Personal history of other diseases of the musculoskeletal system and connective tissue 07/21/2013    History of low back pain    Personal history of other specified conditions 07/21/2013    History of angioedema       Surgical History  Past Surgical History:   Procedure Laterality Date    COLONOSCOPY  08/23/2013    Colonoscopy (Fiberoptic)    CT ANGIO CORONARY ART WITH HEARTFLOW IF SCORE >30%  11/20/2018    CT HEART CORONARY ANGIOGRAM 11/20/2018 [facility] [place]    CT ANGIO NECK  1/29/2022    CT NECK ANGIO W AND WO IV CONTRAST 1/29/2022 [facility] CLINICAL LEGACY    OTHER SURGICAL HISTORY  02/08/2021    Pacemaker insertion    OTHER SURGICAL HISTORY  11/08/2018    Umbilical hernia repair    OTHER SURGICAL HISTORY  12/05/2013    Wrist Surgery        Social History  She reports that she has quit smoking. Her smoking use included cigarettes. She has been exposed to tobacco smoke. She has never used smokeless tobacco. She reports current alcohol use. She reports that she does not use drugs.    Family History  Family History   Problem Relation Name Age of Onset    Aneurysm Mother      Heart disease Mother      Hyperlipidemia Mother      Hypertension Mother      Heart disease Father      Hypertension Father      Other (cardiac disorder) Sister      Coronary artery disease Sister      Other (cardiac disorder) Brother      Coronary artery disease Brother          Allergies  Cephalexin and Lisinopril    Review of Systems   Constitutional:  Negative for chills and fever.   Respiratory:  Negative for cough,  "chest tightness and shortness of breath.    Cardiovascular:  Negative for chest pain and leg swelling.   Gastrointestinal:  Positive for abdominal pain. Negative for anal bleeding, blood in stool, constipation, diarrhea and nausea.   Genitourinary:  Negative for difficulty urinating and dysuria.        Physical Exam  Constitutional:       General: She is not in acute distress.     Appearance: Normal appearance. She is not toxic-appearing.   HENT:      Head: Normocephalic and atraumatic.   Eyes:      Extraocular Movements: Extraocular movements intact.   Cardiovascular:      Rate and Rhythm: Normal rate and regular rhythm.      Heart sounds: Normal heart sounds. No murmur heard.     No friction rub. No gallop.   Pulmonary:      Effort: Pulmonary effort is normal. No respiratory distress.      Breath sounds: Normal breath sounds. No wheezing, rhonchi or rales.   Abdominal:      General: Bowel sounds are normal. There is no distension.      Palpations: Abdomen is soft.      Tenderness: There is no abdominal tenderness. There is no rebound.      Comments: No tenderness to palpation of epigastrium. Pt pointing to epigastrium as source of 2/10 pain.   Musculoskeletal:         General: Normal range of motion.   Skin:     General: Skin is warm and dry.   Neurological:      Mental Status: She is oriented to person, place, and time.   Psychiatric:         Mood and Affect: Mood normal.         Behavior: Behavior normal.          Last Recorded Vitals  Blood pressure 118/72, pulse 52, temperature 36.6 °C (97.9 °F), resp. rate 20, height 1.499 m (4' 11.02\"), weight 54.4 kg (119 lb 14.9 oz), SpO2 94 %.    Relevant Results        Scheduled medications  empagliflozin, 10 mg, oral, Daily  enoxaparin, 40 mg, subcutaneous, q24h  isosorbide dinitrate, 20 mg, oral, TID  metoprolol succinate XL, 25 mg, oral, Daily  spironolactone, 25 mg, oral, q24h MARGIE      Continuous medications     PRN medications  PRN medications: acetaminophen, " alum-mag hydroxide-simeth, polyethylene glycol    Results for orders placed or performed during the hospital encounter of 03/02/24 (from the past 24 hour(s))   ECG 12 lead   Result Value Ref Range    Ventricular Rate 67 BPM    Atrial Rate 67 BPM    TN Interval 166 ms    QRS Duration 90 ms    QT Interval 458 ms    QTC Calculation(Bazett) 483 ms    P Axis 51 degrees    R Axis 39 degrees    T Axis 83 degrees    QRS Count 11 beats    Q Onset 216 ms    P Onset 145 ms    P Offset 196 ms    T Offset 445 ms    QTC Fredericia 475 ms   Sars-CoV-2 and Influenza A/B PCR   Result Value Ref Range    Flu A Result Not Detected Not Detected    Flu B Result Not Detected Not Detected    Coronavirus 2019, PCR Not Detected Not Detected   B-type Natriuretic Peptide   Result Value Ref Range    BNP 48 0 - 99 pg/mL   CBC and Auto Differential   Result Value Ref Range    WBC 7.2 4.4 - 11.3 x10*3/uL    nRBC 0.0 0.0 - 0.0 /100 WBCs    RBC 5.80 (H) 4.00 - 5.20 x10*6/uL    Hemoglobin 16.3 (H) 12.0 - 16.0 g/dL    Hematocrit 48.7 (H) 36.0 - 46.0 %    MCV 84 80 - 100 fL    MCH 28.1 26.0 - 34.0 pg    MCHC 33.5 32.0 - 36.0 g/dL    RDW 13.2 11.5 - 14.5 %    Platelets 274 150 - 450 x10*3/uL    Neutrophils % 49.4 40.0 - 80.0 %    Immature Granulocytes %, Automated 0.3 0.0 - 0.9 %    Lymphocytes % 40.8 13.0 - 44.0 %    Monocytes % 7.7 2.0 - 10.0 %    Eosinophils % 1.4 0.0 - 6.0 %    Basophils % 0.4 0.0 - 2.0 %    Neutrophils Absolute 3.54 1.20 - 7.70 x10*3/uL    Immature Granulocytes Absolute, Automated 0.02 0.00 - 0.70 x10*3/uL    Lymphocytes Absolute 2.92 1.20 - 4.80 x10*3/uL    Monocytes Absolute 0.55 0.10 - 1.00 x10*3/uL    Eosinophils Absolute 0.10 0.00 - 0.70 x10*3/uL    Basophils Absolute 0.03 0.00 - 0.10 x10*3/uL   Comprehensive metabolic panel   Result Value Ref Range    Glucose 91 74 - 99 mg/dL    Sodium 140 136 - 145 mmol/L    Potassium 4.1 3.5 - 5.3 mmol/L    Chloride 103 98 - 107 mmol/L    Bicarbonate 27 21 - 32 mmol/L    Anion Gap 14 10 -  20 mmol/L    Urea Nitrogen 14 6 - 23 mg/dL    Creatinine 1.11 (H) 0.50 - 1.05 mg/dL    eGFR 54 (L) >60 mL/min/1.73m*2    Calcium 9.8 8.6 - 10.6 mg/dL    Albumin 4.6 3.4 - 5.0 g/dL    Alkaline Phosphatase 84 33 - 136 U/L    Total Protein 8.4 (H) 6.4 - 8.2 g/dL    AST 36 9 - 39 U/L    Bilirubin, Total 0.4 0.0 - 1.2 mg/dL    ALT 35 7 - 45 U/L   Troponin I, High Sensitivity, Initial   Result Value Ref Range    Troponin I, High Sensitivity 13 0 - 34 ng/L   SST TOP   Result Value Ref Range    Extra Tube Hold for add-ons.    Lipase   Result Value Ref Range    Lipase 35 9 - 82 U/L   Troponin, High Sensitivity, 1 Hour   Result Value Ref Range    Troponin I, High Sensitivity 9 0 - 34 ng/L         XR chest 2 views    Result Date: 3/3/2024  STUDY: Chest Radiographs;  3/2/2024 11:57 PM. INDICATION: Chest pain. COMPARISON: CXR 4/8/2023 ACCESSION NUMBER(S): [id number] ORDERING CLINICIAN: EDDIE ZAMBRANO TECHNIQUE:  Frontal and lateral chest. FINDINGS: CARDIOMEDIASTINAL SILHOUETTE: Heart size is top normal.  There is prominence of the aortic arch and tortuosity of the descending aorta which appears similar to prior study.  There is elevation right hemidiaphragm which is increased compared with prior study.  LUNGS: Lungs are clear.  There is no pneumothorax or pleural effusion.  ABDOMEN: No remarkable upper abdominal findings.  BONES: No acute osseous changes.    No acute cardiopulmonary disease.  Elevation right hemidiaphragm. This is increased compared with prior study. Signed by Venkatesh Crump DO    ECG 12 lead    Result Date: 3/2/2024  Atrial-paced rhythm with occasional Premature ventricular complexes Nonspecific T wave abnormality Prolonged QT Abnormal ECG When compared with ECG of 20-DEC-2023 15:58, Electronic atrial pacemaker has replaced Sinus rhythm See ED provider note for full interpretation and clinical correlation Confirmed by Elba Fragoso ([id number]) on 3/2/2024 11:53:35 PM        Assessment/Plan   Principal  "Problem:    Chest pain      Ms. Sandy Smith is a 67 yo female with PMH significant for NICM (EF 50-55% 08/2023) s/p RFA (10/2020) and ICD (02/2021), HTN, chronic HCV who presents after 1 week onset of epigastric pain with presentation that is most consistent with GERD vs PUD. Concern for cardiac etiology of pain is very low at this time.     #Epigastric pain  ::1 week onset, pt describes as burning like sensation. Improved when holding stomach. Unclear temporal relation, but pt notes that it does seem to occur more often after eating.   :: Pt recently prescribed ibuprofen 600mg TID after 12/2023 office visit with ENT for management of TMJ symptoms.   :: Differential includes GERD (burning sensation, with some throat pain) vs PUD (recent ibuprofen use, relation with eating) vs MSK pain. Concern for cardiac related pain is very low at this time.  :: Pt notes that her pain completely resolved with use of Maalox.  Plan:  -Discharge pt with outpt follow up and with prescription of Maalox as pt notes this completely resolved her symtpoms.    #NICM (EF 50-55% 08/2023) s/p RFA (10/2020) and ICD (02/2021)  ::NICM was first noted in 2018, with Echo at the time showing EF 35-40%.  ::Pt was admitted in 09/2020 with hypertension and \"bradycardia\" which was due to frequent PVC's. Her echo 09/2020 showed EF 20% and LHC showed no obstructive CAD. After discharge she wore a monitor which showed PVC burden of 39%.  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 Pt with implantation of a dual chamber ICD in 02/2021. She stopped amiodarone for potential to pursue HCV treatment (which is still pending). She has not had any recurrence of arrhythmias while off amiodarone.  - Continue home metoprolol succinate 25mg daily, isordil 20mg TID, spironolactone 25mg daily    #HTN  -Continue home medications    #chronic HCV  :: Follows outpatient with Dr. Carr. Had " previously been holding off on treatment since she was on amiodarone at that time.  had sent pt message on 01/10/2024 to move forward with plan for HCV treatment.  -F/up outpt with Dr. Carr    F: PRN  E: PRN  N: Cardiac  A: PIV    Code Status: Full Code  NOK:  Nik Montano - [phone]            Joel Cabrera MD

## 2024-03-03 NOTE — ED PROVIDER NOTES
HPI   Chief Complaint   Patient presents with    Heartburn    Flu Symptoms       HPI  This is a 68-year-old female with past medical history of cardiomyopathy, chronic hep C, hypertension, prediabetes, status post defibrillator due to nonsustained V. tach, CHF most recent EF August 2023 50 to 55%, presenting with a chief complaint of chest pain.  She states this started a few days ago and is burning in sensation.  She states the first time it started she was able to belch afterwards and the pain was relieved however returned and has not left since.  She believes this is reflux however she has no history of reflux.  He describes the pain as midsternal, nonradiating and burning in sensation.  She does endorse some shortness of breath, she is unable to state whether this is exertional or orthopnea.  Additionally she endorses some generalized fatigue, headache, feeling like there is pressure behind both ears.  She states that she took an antibiotic at home but is unsure which one it was.  Otherwise she is taking no pain medication at home  She denies history of prior DVT or PE, unilateral leg swelling, recent surgery or immobilization and is not on hormone therapy.  She does note that her left knee has been swollen, she is status post total knee arthroplasty.                  [place] Coma Scale Score: 15                     Patient History   Past Medical History:   Diagnosis Date    Encounter for general adult medical examination without abnormal findings 01/31/2022    Encounter for initial preventive physical examination covered by Medicare    Otitis media, unspecified, right ear 01/31/2014    Acute right otitis media    Personal history of other diseases of the digestive system 07/21/2013    History of esophageal reflux    Personal history of other diseases of the musculoskeletal system and connective tissue 07/21/2013    History of low back pain    Personal history of other specified conditions 07/21/2013    History  of angioedema     Past Surgical History:   Procedure Laterality Date    COLONOSCOPY  08/23/2013    Colonoscopy (Fiberoptic)    CT ANGIO CORONARY ART WITH HEARTFLOW IF SCORE >30%  11/20/2018    CT HEART CORONARY ANGIOGRAM [place] [facility] [place]    CT ANGIO NECK  1/29/2022    CT NECK ANGIO W AND WO IV CONTRAST 1/29/2022 [facility] CLINICAL LEGACY    OTHER SURGICAL HISTORY  02/08/2021    Pacemaker insertion    OTHER SURGICAL HISTORY  11/08/2018    Umbilical hernia repair    OTHER SURGICAL HISTORY  12/05/2013    Wrist Surgery     Family History   Problem Relation Name Age of Onset    Aneurysm Mother      Heart disease Mother      Hyperlipidemia Mother      Hypertension Mother      Heart disease Father      Hypertension Father      Other (cardiac disorder) Sister      Coronary artery disease Sister      Other (cardiac disorder) Brother      Coronary artery disease Brother       Social History     Tobacco Use    Smoking status: Former     Types: Cigarettes     Passive exposure: Past    Smokeless tobacco: Never   Vaping Use    Vaping Use: Never used   Substance Use Topics    Alcohol use: Yes     Comment: occasional    Drug use: Never       Physical Exam   ED Triage Vitals [03/02/24 2318]   Temperature Heart Rate Respirations BP   36.4 °C (97.6 °F) 68 18 120/74      Pulse Ox Temp Source Heart Rate Source Patient Position   99 % Oral -- --      BP Location FiO2 (%)     -- --       Physical Exam  Vitals and nursing note reviewed.   Constitutional:       General: She is not in acute distress.     Appearance: She is well-developed.   HENT:      Head: Normocephalic and atraumatic.      Right Ear: Tympanic membrane normal.      Left Ear: Tympanic membrane normal.   Eyes:      Conjunctiva/sclera: Conjunctivae normal.   Cardiovascular:      Rate and Rhythm: Normal rate and regular rhythm.      Pulses:           Radial pulses are 2+ on the right side and 2+ on the left side.        Posterior tibial pulses are 2+ on the right side  "and 2+ on the left side.      Heart sounds: No murmur heard.  Pulmonary:      Effort: Pulmonary effort is normal. No respiratory distress.      Breath sounds: Normal breath sounds.   Abdominal:      Palpations: Abdomen is soft.      Tenderness: There is no abdominal tenderness.   Musculoskeletal:         General: No swelling.      Cervical back: Neck supple.      Right lower leg: No edema.      Left lower leg: No edema.   Skin:     General: Skin is warm and dry.      Capillary Refill: Capillary refill takes less than 2 seconds.   Neurological:      Mental Status: She is alert.   Psychiatric:         Mood and Affect: Mood normal.         ED Course & MDM   ED Course as of 03/03/24 0248   Sun Mar 03, 2024   0000 EKG shows an atrial paced rhythm with a rate of 67 bpm.  There is a PVC noted.  No ST elevations or depressions consistent for ischemia.  Normal DE and QTc interval.  T wave inversions noted in V1 and aVR [AW]   0144 Alerted by RN that patient states that she was feeling \"weird\", like she was having a panic attack.  Patient states that she feels nervous.  Repeat EKG shows sinus rhythm with a rate of 67 bpm, no ST elevations or depressions concerning for ischemia.  T wave inversions aVR and V1 are still present [AW]      ED Course User Index  [AW] Marifer Fransico, DO         Diagnoses as of 03/03/24 0248   Other chest pain       Medical Decision Making  This is a 68-year-old female with multiple cardiac risk factors presenting with burning midsternal chest pain for the past few days that she has never had before.  She is well-appearing on physical exam, vitals are stable and patient is afebrile.  IV inserted and lab work obtained.  The differential includes ACS, pneumonia, viral infection, PE, GERD, CHF exacerbation.  Her lab work was generally reassuring with no leukocytosis or anemia, mild increased creatinine at 1.11, troponins 13 and 9 respectively negative COVID flu.  BNP was 48.  Chest x-ray interpreted by " myself which shows no consolidation, pneumothorax or effusion.  Repeat EKGs were nonischemic.  Given patient's cardiac history and this new onset pain that was not particularly relieved with Maalox in the emergency department, recommended admission for the patient for a cardiac workup.  She is in agreement with this.  All questions answered.  Patient admitted in stable condition.    Procedure  Procedures     Marifer Bateman DO  Resident  03/03/24 0876

## 2024-03-03 NOTE — DISCHARGE INSTRUCTIONS
Dear Ms. Luis,    It was a pleasure taking care of you [facility].  You were admitted to the hospital because you are experiencing pain in your epigastric/lower chest area that was initially concerning for a problem with your heart.  However, upon further workup it is very unlikely that this pain is related to your heart.  Based on the description of your symptoms along with our workup while you were in the hospital, it is believed that this pain is likely due to to gastric reflux.  You are being discharged to home with plan for outpatient follow-up with your PCP, along with use of of an antacid for symptomatic control.    There are some ways you can limit your symptoms with gastric reflux. Please see below!  Avoid beer, wine, and mixed drinks and avoid caffeine.  If you smoke, try to quit. Your doctor or nurse can help.  Keep a diary of your signs. Write down what you had to eat before you had reflux. This will help you learn which foods cause you problems. For some people, they need to avoid coffee, chocolate, alcohol, spicy or fatty foods, or peppermint.  Avoid eating for 2 to 3 hours before bedtime. Lying down after you eat can make reflux worse.  Avoid belts and clothes that are too tight.    Please follow up with your PCP as scheduled.   Please follow up with your gastroenterologist regarding treatment of your chronic Hepatitis C.   Please manage your symptoms with antacids (available over the counter as Mylanta, TUMS, etc.). [facility] that [facility]  tomorrow

## 2024-03-03 NOTE — DISCHARGE SUMMARY
Discharge Diagnosis  Gastroesophageal Reflux    Issues Requiring Follow-Up  PCP -> Please f/up regarding GERD symptoms, consider starting pt on a PPI. Previously prescribed famotidine but unclear if she is taking it regularly.    Gastroenterology -> Please f/up regarding chronic HCV treatment.    Test Results Pending At Discharge  Pending Labs       No current pending labs.            Hospital Course  Ms. Sandy Smith is a 69 yo female with PMH significant for NICM (EF 50-55% 08/2023) s/p RFA (10/2020) and ICD (02/2021), HTN, chronic HCV who presents after 1 week onset of epigastric pain.  Differential includes GERD (burning sensation, with some throat pain) vs PUD (recent ibuprofen use, relation with eating) vs MSK pain. Concern for cardiac related pain is very low at this time. Concern for cardiac etiology of pain is very low at this time.     In ED, pt was HDS on arrival with workup largely negative. Troponin was 13 -> 9. BNP 48. Lipase 35. CMP and CBC unremarkable. CXR with no acute process. Pt was given Mylanta and admitted for further cardiac workup. Upon arrival to the floor patient states that after taking Tylenol and Mylanta, her pain is completely resolved.  Pt was discharged on 3/3/24 to home with outpt follow up and with a prescription of Mylanta.    Pertinent Physical Exam At Time of Discharge  Vitals:    03/03/24 1049   BP: 118/72   Pulse: 52   Resp: 20   Temp: 36.6 °C (97.9 °F)   SpO2: 94%         Physical Exam  Constitutional:       General: She is not in acute distress.     Appearance: Normal appearance. She is not toxic-appearing.   HENT:      Head: Normocephalic and atraumatic.   Eyes:      Extraocular Movements: Extraocular movements intact.   Cardiovascular:      Rate and Rhythm: Normal rate and regular rhythm.      Heart sounds: Normal heart sounds. No murmur heard.     No friction rub. No gallop.   Pulmonary:      Effort: Pulmonary effort is normal. No respiratory distress.      Breath  sounds: Normal breath sounds. No wheezing, rhonchi or rales.   Abdominal:      General: Bowel sounds are normal. There is no distension.      Palpations: Abdomen is soft.      Tenderness: There is no abdominal tenderness. There is no rebound.   Musculoskeletal:         General: Normal range of motion.   Skin:     General: Skin is warm and dry.   Neurological:      Mental Status: She is oriented to person, place, and time.   Psychiatric:         Mood and Affect: Mood normal.         Behavior: Behavior normal.         Home Medications     Medication List      START taking these medications     alum-mag hydroxide-simeth 200-200-20 mg/5 mL oral suspension; Commonly   known as: Mylanta; Take 20 mL by mouth 4 times a day as needed for   indigestion or heartburn for up to 14 days.     CONTINUE taking these medications     acetaminophen 325 mg tablet; Commonly known as: TylenoL; Take 2 tablets   (650 mg) by mouth every 6 hours if needed for mild pain (1 - 3) or fever   (temp greater than 38.0 C).   alendronate 70 mg tablet; Commonly known as: Fosamax; TAKE 1 TABLET BY   MOUTH EVERY 7 DAYS. TAKE IN THE MORNING WITH A FULL GLASS OF WATER, ON AN   EMPTY STOMACH, AND DO NOT TAKE ANYTHING ELSE BY MOUTH OR LIE FOR THE NEXT   30 MINUTES.   calcium carbonate 215 mg calcium (500 mg) tablet,chewable; Chew 1 tablet   2 times a day.   cetirizine 10 mg tablet; Commonly known as: ZyrTEC   cholecalciferol 400 unit capsule; Commonly known as: Vitamin D-3   ciclopirox 8 % solution; Commonly known as: Penlac; APPLY TO AFFECTED   TOENAILS ONCE DAILY.   diclofenac sodium 1 % gel; Commonly known as: Voltaren   empagliflozin 10 mg; Commonly known as: Jardiance; TAKE 1 TABLET BY   MOUTH ONCE DAILY   famotidine 20 mg tablet; Commonly known as: Pepcid; Take 1 tablet (20   mg) by mouth once daily.   fluticasone 50 mcg/actuation nasal spray; Commonly known as: Flonase   hydrALAZINE 10 mg tablet; Commonly known as: Apresoline; TAKE 1 TABLET   BY MOUTH  EVERY 8 HOURS   isosorbide dinitrate 20 mg tablet; Commonly known as: Isordil; TAKE 1   TABLET BY MOUTH THREE TIMES A DAY   lidocaine 5 % cream cream; Commonly known as: Xylocaine   Metamucil Sugar-Free (aspart) 3.4 gram/5.8 gram powder; Generic drug:   psyllium husk (aspartame)   methocarbamol 500 mg tablet; Commonly known as: Robaxin   metoprolol succinate XL 25 mg 24 hr tablet; Commonly known as: Toprol-XL   NasogeL 0.9 % spray gel; Generic drug: NaCl-NaHCO3-hyaluron sod-aloe   sodium chloride-aloe vera spray,non-aerosol   spironolactone 25 mg tablet; Commonly known as: Aldactone; TAKE 1 TABLET   (25 MG) BY MOUTH ONCE DAILY.       Outpatient Follow-Up  Future Appointments   Date Time Provider Department Center   3/6/2024  2:30 PM Olga Newsome MD [id number] [facility]   3/12/2024  9:45 AM Aurelio Hastings DO [id number] [facility]   3/20/2024 12:00 PM ARIELA Mahajan [id number] [facility]   3/25/2024 11:00 AM ARIELA Mahajan [id number] [facility]   7/8/2024  9:40 AM Lupillo Haddad MD [id number] [facility]       Joel Cabrera MD

## 2024-03-03 NOTE — ED TRIAGE NOTES
Patient states she is feeling fatigue and having acid reflux that comes and goes for the last couple days but tonight it has lasted longer. Pt also states she feels like her head is big and her ears are clogging. Pt reports having a defibrillator and taking her regular medications but nothing for her acid reflux.

## 2024-03-04 ENCOUNTER — PHARMACY VISIT (OUTPATIENT)
Dept: PHARMACY | Facility: CLINIC | Age: 69
End: 2024-03-04
Payer: COMMERCIAL

## 2024-03-04 ENCOUNTER — PATIENT OUTREACH (OUTPATIENT)
Dept: CARE COORDINATION | Facility: CLINIC | Age: 69
End: 2024-03-04
Payer: COMMERCIAL

## 2024-03-04 PROCEDURE — RXOTC WILLOW AMBULATORY OTC CHARGE

## 2024-03-04 NOTE — PROGRESS NOTES
Outreach call to patient to support a smooth transition of care from recent admission.  Spoke with patient, reviewed discharge medications, discharge instructions and assessed social needs. Follow up with PCP on 3/6. No needs identified.  Will continue to monitor through transition period.

## 2024-03-06 ENCOUNTER — PHARMACY VISIT (OUTPATIENT)
Dept: PHARMACY | Facility: CLINIC | Age: 69
End: 2024-03-06
Payer: COMMERCIAL

## 2024-03-06 ENCOUNTER — OFFICE VISIT (OUTPATIENT)
Dept: PRIMARY CARE | Facility: CLINIC | Age: 69
End: 2024-03-06
Payer: COMMERCIAL

## 2024-03-06 VITALS
SYSTOLIC BLOOD PRESSURE: 110 MMHG | HEIGHT: 59 IN | WEIGHT: 123 LBS | HEART RATE: 74 BPM | BODY MASS INDEX: 24.8 KG/M2 | DIASTOLIC BLOOD PRESSURE: 73 MMHG | TEMPERATURE: 96.7 F | OXYGEN SATURATION: 95 %

## 2024-03-06 DIAGNOSIS — Z12.31 BREAST CANCER SCREENING BY MAMMOGRAM: ICD-10-CM

## 2024-03-06 DIAGNOSIS — Z12.39 BREAST SCREENING: ICD-10-CM

## 2024-03-06 DIAGNOSIS — M81.0 OSTEOPOROSIS WITHOUT CURRENT PATHOLOGICAL FRACTURE, UNSPECIFIED OSTEOPOROSIS TYPE: ICD-10-CM

## 2024-03-06 DIAGNOSIS — Z01.00 ENCOUNTER FOR VISION SCREENING: ICD-10-CM

## 2024-03-06 DIAGNOSIS — N95.1 VAGINAL DRYNESS, MENOPAUSAL: Primary | ICD-10-CM

## 2024-03-06 PROCEDURE — 90472 IMMUNIZATION ADMIN EACH ADD: CPT

## 2024-03-06 PROCEDURE — 1036F TOBACCO NON-USER: CPT

## 2024-03-06 PROCEDURE — 3074F SYST BP LT 130 MM HG: CPT

## 2024-03-06 PROCEDURE — RXMED WILLOW AMBULATORY MEDICATION CHARGE

## 2024-03-06 PROCEDURE — 3078F DIAST BP <80 MM HG: CPT

## 2024-03-06 PROCEDURE — 90715 TDAP VACCINE 7 YRS/> IM: CPT

## 2024-03-06 PROCEDURE — 1159F MED LIST DOCD IN RCRD: CPT

## 2024-03-06 PROCEDURE — 99213 OFFICE O/P EST LOW 20 MIN: CPT

## 2024-03-06 PROCEDURE — 1126F AMNT PAIN NOTED NONE PRSNT: CPT

## 2024-03-06 PROCEDURE — G0009 ADMIN PNEUMOCOCCAL VACCINE: HCPCS

## 2024-03-06 PROCEDURE — 90677 PCV20 VACCINE IM: CPT

## 2024-03-06 RX ORDER — ESTRADIOL 0.1 MG/G
2 CREAM VAGINAL DAILY
Qty: 42.5 G | Refills: 11 | Status: SHIPPED | OUTPATIENT
Start: 2024-03-06 | End: 2025-03-06

## 2024-03-06 RX ORDER — CALCIUM CARBONATE 200(500)MG
1 TABLET,CHEWABLE ORAL 2 TIMES DAILY
Qty: 60 TABLET | Refills: 2 | Status: SHIPPED | OUTPATIENT
Start: 2024-03-06 | End: 2024-06-09 | Stop reason: SDUPTHER

## 2024-03-06 ASSESSMENT — PATIENT HEALTH QUESTIONNAIRE - PHQ9
2. FEELING DOWN, DEPRESSED OR HOPELESS: NOT AT ALL
SUM OF ALL RESPONSES TO PHQ9 QUESTIONS 1 AND 2: 0
1. LITTLE INTEREST OR PLEASURE IN DOING THINGS: NOT AT ALL

## 2024-03-06 ASSESSMENT — ENCOUNTER SYMPTOMS
OCCASIONAL FEELINGS OF UNSTEADINESS: 0
LOSS OF SENSATION IN FEET: 0
DEPRESSION: 0

## 2024-03-06 ASSESSMENT — PAIN SCALES - GENERAL: PAINLEVEL: 0-NO PAIN

## 2024-03-06 NOTE — PROGRESS NOTES
Subjective   Reason for Visit: Sandy Smith is an 68 y.o. female here for a Medicare Wellness visit.     HPI    # Health Maintenance  - Last Dental: recommended follow up every 6mo: 5 month ago  - Last Eye exam: recommended follow up annually - sent referral   - Hearing: denies recent hearing loss denies  - Diet: healthy diet  - Exercise: walking and ballroom dancing    - Weight: stable,   - Smoking: ex-smoker  - EtOH: Alcohol Use: Yes, patient drinks alcohol.  - Illicit substances: denied.  - Employment: self employed  - Living Situation: house, feels safe at home. Smoke detectors   - Colon CA: last prior screening Colonoscopy in 2015 // family h/o colon ca? No    WOMEN  - Menstrual Status:  post menopausal   - Pregnancy history: No obstetric history on file.  - Bladder dysfunction? has not had any episodes of incontinence  - Cervical CA: last prior pap 2019 will return in 4 weeks for pap // h/o abnormal pap? No  - Breast CA: last prior mammo 2023 // h/o abnormal mammo? No    Ability to perform ADLs: independent  Fall risk: low  Hearing impairment: unchanged  Home safety risk factors: Home Safety Risk Factors: None    - Flu vaccine: recommended annually, will receive at [facility]  - COVID vaccine: recommended completion of primary series and recommended boosters, Will receive at [facility]  - Shingrix (50+): will receive at [facility]   - Prevnar (PCV20): completed today  - Tdap: completed today  - STI screen: declined GC, chlamydia, trich, syphilis, HIV, HepC  - DM screening: not indicated  - HTN screening: wnl today  - Food Insecurity screen: denies  - Depression: PHQ-2: neg  - Tobacco Cessation: not indicated, patient denies smoking  - Colonoscopy (45-75): not indicated   - Lung CA screening (50-80, >20py): not indicated   - Pap smear (21-65F): will do pap during fu visit   - Breast CA screening (40-74F): ordered  - Osteoporosis (65+F): last dexa 2023 with classification of osteoporosis on alondranate currently not taking. On  vit d    IMMUNIZATIONS  Immunization History   Administered Date(s) Administered    Flu vaccine (IIV4), preservative free *Check age/dose* 01/04/2019    Influenza, seasonal, injectable 11/26/2002, 01/16/2013, 11/20/2015, 01/14/2020    Moderna COVID-19 vaccine, bivalent, blue cap/gray label *Check age/dose* 09/23/2022    Moderna SARS-CoV-2 Vaccination 03/11/2021, 04/08/2021, 11/11/2021    Tdap vaccine, age 7 year and older (BOOSTRIX, ADACEL) 01/16/2013     Social Determinants of Health     Tobacco Use: Medium Risk (3/6/2024)    Patient History     Smoking Tobacco Use: Former     Smokeless Tobacco Use: Never     Passive Exposure: Past   Alcohol Use: Not At Risk (3/3/2024)    AUDIT-C     Frequency of Alcohol Consumption: Monthly or less     Average Number of Drinks: 1 or 2     Frequency of Binge Drinking: Never   Financial Resource Strain: Low Risk  (3/3/2024)    Overall Financial Resource Strain (CARDIA)     Difficulty of Paying Living Expenses: Not hard at all   Food Insecurity: Not on file   Transportation Needs: No Transportation Needs (3/3/2024)    PRAPARE - Transportation     Lack of Transportation (Medical): No     Lack of Transportation (Non-Medical): No   Physical Activity: Not on file   Stress: Not on file   Social Connections: Not on file   Intimate Partner Violence: Not on file   Depression: Not at risk (3/6/2024)    PHQ-2     PHQ-2 Score: 0   Housing Stability: Low Risk  (3/3/2024)    Housing Stability Vital Sign     Unable to Pay for Housing in the Last Year: No     Number of Places Lived in the Last Year: 1     Unstable Housing in the Last Year: No   Utilities: Not on file   Digital Equity: Not on file     FamHx:  Family History   Problem Relation Name Age of Onset    Aneurysm Mother      Heart disease Mother      Hyperlipidemia Mother      Hypertension Mother      Heart disease Father      Hypertension Father      Other (cardiac disorder) Sister      Coronary artery disease Sister      Other (cardiac  disorder) Brother      Coronary artery disease Brother         PMH:  Patient Active Problem List   Diagnosis    Allergic rhinitis    Astigmatism of both eyes with presbyopia    Asymmetry of clavicles    Avulsion fracture of ankle    BPPV (benign paroxysmal positional vertigo)    Bradycardia    C2 cervical fracture (CMS/HCC)    Cardiomyopathy (CMS/HCC)    Cerumen debris on tympanic membrane of both ears    Cervical pain    Chronic hepatitis C without hepatic coma (CMS/HCC)    Chronic sinusitis    Closed fracture of left tibial plateau    Constipated    External hemorrhoid    Fall at home, initial encounter    GERD (gastroesophageal reflux disease)    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    Hyperlipidemia    Hypertension    ICD (implantable cardioverter-defibrillator) in place    Left knee pain    Lipoma of left upper extremity    Mixed conductive and sensorineural hearing loss of both ears    Myopia of both eyes with regular astigmatism    Nail dystrophy    Hypermetropia of both eyes    Non-sustained ventricular tachycardia (CMS/HCC)    Otitis media, right    Otosclerosis of both ears    Overweight    Patellar fracture    PVD (posterior vitreous detachment), both eyes    Retinal tear of left eye    Rib pain    Right ear pain    Right shoulder pain    Rotator cuff tear arthropathy of right shoulder    Sinus congestion    Sprain of unspecified collateral ligament of left knee, initial encounter    Sprain of right foot    TMJ crepitus    Cellulitis of fourth toe of left foot    Toe infection    Toe pain, left    Vaginitis    Vitamin D deficiency    Weight loss, unintentional    Vitreous flashes of both eyes    Toenail deformity    Nasal bleeding    Chronic kidney disease    Bilateral carpal tunnel syndrome    Displaced bicondylar fracture of left tibia, subsequent encounter for closed fracture with routine healing    Sprain of collateral ligament of left knee    Temporal arteritis (CMS/HCC)    Prediabetes    Other seborrheic keratosis     Onychomycosis of toenail    Chest pain     Past Medical History:   Diagnosis Date    Encounter for general adult medical examination without abnormal findings 01/31/2022    Encounter for initial preventive physical examination covered by Medicare    Otitis media, unspecified, right ear 01/31/2014    Acute right otitis media    Personal history of other diseases of the digestive system 07/21/2013    History of esophageal reflux    Personal history of other diseases of the musculoskeletal system and connective tissue 07/21/2013    History of low back pain    Personal history of other specified conditions 07/21/2013    History of angioedema       SurgHx:  Past Surgical History:   Procedure Laterality Date    COLONOSCOPY  08/23/2013    Colonoscopy (Fiberoptic)    CT ANGIO CORONARY ART WITH HEARTFLOW IF SCORE >30%  11/20/2018    CT HEART CORONARY ANGIOGRAM 11/20/2018 [facility] [facility]CILLARY LEGACY    CT ANGIO NECK  1/29/2022    CT NECK ANGIO W AND WO IV CONTRAST 1/29/2022 [facility] CLINICAL LEGACY    OTHER SURGICAL HISTORY  02/08/2021    Pacemaker insertion    OTHER SURGICAL HISTORY  11/08/2018    Umbilical hernia repair    OTHER SURGICAL HISTORY  12/05/2013    Wrist Surgery       Meds:  Current Outpatient Medications   Medication Instructions    acetaminophen (TYLENOL) 650 mg, oral, Every 6 hours PRN    alendronate (Fosamax) 70 mg tablet TAKE 1 TABLET BY MOUTH EVERY 7 DAYS. TAKE IN THE MORNING WITH A FULL GLASS OF WATER, ON AN EMPTY STOMACH, AND DO NOT TAKE ANYTHING ELSE BY MOUTH OR LIE FOR THE NEXT 30 MINUTES.    calcium carbonate (TUMS) 500 mg, oral, 2 times daily    cetirizine (ZyrTEC) 10 mg tablet Take 1 tablet (10 mg) by mouth once daily as needed.    cholecalciferol, vitamin D3, 10 mcg (400 unit) capsule TAKE 1 CAPSULE Daily    ciclopirox (Penlac) 8 % solution APPLY TO AFFECTED TOENAILS ONCE DAILY.    diclofenac sodium (Voltaren) 1 % gel gel Apply sparingly to affected areas three times a day as needed for pain    empagliflozin  "(Jardiance) 10 mg TAKE 1 TABLET BY MOUTH ONCE DAILY    estradiol (ESTRACE) 2 g, vaginal, Daily, Apply to vagina nightly for 1 week then every Monday/Wednesday/Friday.    famotidine (PEPCID) 20 mg, oral, Daily    fluticasone (Flonase) 50 mcg/actuation nasal spray USE 1 TO 2 SPRAYS IN EACH NOSTRIL ONCE DAILY.    hydrALAZINE (Apresoline) 10 mg tablet TAKE 1 TABLET BY MOUTH EVERY 8 HOURS    isosorbide dinitrate (Isordil) 20 mg tablet TAKE 1 TABLET BY MOUTH THREE TIMES A DAY    lidocaine (Xylocaine) 5 % cream cream Apply to area of pain once daily as needed.    methocarbamol (Robaxin) 500 mg tablet Take 1-2 tablets as needed at bedtime for muscle tightness, spasms    metoprolol succinate XL (TOPROL-XL) 25 mg, oral, Daily    NaCl-NaHCO3-hyaluron sod-aloe (NasogeL) 0.9 % spray gel USE AS DIRECTED.    psyllium husk, aspartame, (Metamucil Sugar-Free, aspart,) 3.4 gram/5.8 gram powder Use as directed 1-3 times daily as needed to maintain regular bowel movements.    sodium chloride-aloe vera spray,non-aerosol 1 spray each nostril at bedtime    spironolactone (Aldactone) 25 mg tablet TAKE 1 TABLET (25 MG) BY MOUTH ONCE DAILY.       Objective   Vitals:  /73 (BP Location: Right arm, Patient Position: Sitting)   Pulse 74   Temp 35.9 °C (96.7 °F)   Ht 1.499 m (4' 11\")   Wt 55.8 kg (123 lb)   SpO2 95%   BMI 24.84 kg/m²       Physical Exam  Vitals reviewed.   Constitutional:       General: She is not in acute distress.     Appearance: Normal appearance.   HENT:      Head: Atraumatic.      Right Ear: External ear normal.      Left Ear: External ear normal.      Mouth/Throat:      Mouth: Mucous membranes are moist.      Pharynx: Oropharynx is clear.   Eyes:      Extraocular Movements: Extraocular movements intact.      Conjunctiva/sclera: Conjunctivae normal.      Pupils: Pupils are equal, round, and reactive to light.   Cardiovascular:      Rate and Rhythm: Normal rate and regular rhythm.      Pulses: Normal pulses.      " Heart sounds: Normal heart sounds. No murmur heard.     No gallop.   Pulmonary:      Effort: No respiratory distress.      Breath sounds: Normal breath sounds. No wheezing or rales.   Abdominal:      General: Bowel sounds are normal.      Palpations: There is no mass.      Tenderness: There is no abdominal tenderness. There is no guarding.   Musculoskeletal:         General: No swelling. Normal range of motion.      Cervical back: Normal range of motion. No tenderness.   Lymphadenopathy:      Cervical: No cervical adenopathy.   Skin:     General: Skin is warm and dry.      Capillary Refill: Capillary refill takes less than 2 seconds.   Neurological:      Mental Status: She is alert.         Assessment/Plan     68 y.o. female here for Medicare Annual Wellness Exam.    Problem List Items Addressed This Visit      Visit Diagnoses       Vaginal dryness, menopausal    -  Primary    Relevant Medications    estradiol (Estrace) 0.01 % (0.1 mg/gram) vaginal cream    GERD      Relevant Medications    calcium carbonate (Tums) 200 mg calcium chewable tablet    Breast screening        Relevant Orders    BI mammo bilateral screening tomosynthesis    Encounter for vision screening        Relevant Orders    Referral to Ophthalmology         Health maintenance   -Colonoscopy not indicated, due 2025  -Mammogram ordered will follow-up results   -Pap smear scheduled during follow-up visit, last PAP 2019 (patient 63 years old) and did not receive last pap at 65. Previous Paps have been nml   -Tdap and Prevnar 20 received during visit. Will receive covid, flu, and shingrix at [facility]   -Last HbA1c 5.4   -Sexually active with 1 male partner, declines STI screening today. Discussed safe practices   -Low dose CT not indicated   -Screened for depression - neg   -Dexa scan in 2013 with osteoporosis, patient reports not taking alondronate however she is taking vit D    Patient seen and discussed with attending physician (brendon listed on this  note).    RTC in 3 months for pap, or earlier as needed.    Olga Newsome MD   PGY1, Fam Med

## 2024-03-12 ENCOUNTER — APPOINTMENT (OUTPATIENT)
Dept: ORTHOPEDIC SURGERY | Facility: HOSPITAL | Age: 69
End: 2024-03-12
Payer: COMMERCIAL

## 2024-03-12 DIAGNOSIS — I50.20 HFREF (HEART FAILURE WITH REDUCED EJECTION FRACTION) (MULTI): ICD-10-CM

## 2024-03-14 PROCEDURE — RXMED WILLOW AMBULATORY MEDICATION CHARGE

## 2024-03-14 RX ORDER — SPIRONOLACTONE 25 MG/1
25 TABLET ORAL
Qty: 90 TABLET | Refills: 1 | Status: SHIPPED | OUTPATIENT
Start: 2024-03-14 | End: 2025-03-14

## 2024-03-16 PROCEDURE — RXMED WILLOW AMBULATORY MEDICATION CHARGE

## 2024-03-18 ENCOUNTER — OFFICE VISIT (OUTPATIENT)
Dept: ORTHOPEDIC SURGERY | Facility: HOSPITAL | Age: 69
End: 2024-03-18
Payer: COMMERCIAL

## 2024-03-18 DIAGNOSIS — G56.02 CARPAL TUNNEL SYNDROME OF LEFT WRIST: Primary | ICD-10-CM

## 2024-03-18 PROCEDURE — 1036F TOBACCO NON-USER: CPT | Performed by: STUDENT IN AN ORGANIZED HEALTH CARE EDUCATION/TRAINING PROGRAM

## 2024-03-18 PROCEDURE — 99213 OFFICE O/P EST LOW 20 MIN: CPT | Performed by: STUDENT IN AN ORGANIZED HEALTH CARE EDUCATION/TRAINING PROGRAM

## 2024-03-18 PROCEDURE — 20526 THER INJECTION CARP TUNNEL: CPT | Performed by: STUDENT IN AN ORGANIZED HEALTH CARE EDUCATION/TRAINING PROGRAM

## 2024-03-18 PROCEDURE — 1159F MED LIST DOCD IN RCRD: CPT | Performed by: STUDENT IN AN ORGANIZED HEALTH CARE EDUCATION/TRAINING PROGRAM

## 2024-03-18 RX ORDER — LIDOCAINE HYDROCHLORIDE 10 MG/ML
0.5 INJECTION INFILTRATION; PERINEURAL
Status: COMPLETED | OUTPATIENT
Start: 2024-03-18 | End: 2024-03-18

## 2024-03-18 RX ORDER — BETAMETHASONE SODIUM PHOSPHATE AND BETAMETHASONE ACETATE 3; 3 MG/ML; MG/ML
6 INJECTION, SUSPENSION INTRA-ARTICULAR; INTRALESIONAL; INTRAMUSCULAR; SOFT TISSUE
Status: COMPLETED | OUTPATIENT
Start: 2024-03-18 | End: 2024-03-18

## 2024-03-18 RX ADMIN — LIDOCAINE HYDROCHLORIDE 0.5 ML: 10 INJECTION INFILTRATION; PERINEURAL at 11:33

## 2024-03-18 RX ADMIN — BETAMETHASONE SODIUM PHOSPHATE AND BETAMETHASONE ACETATE 6 MG: 3; 3 INJECTION, SUSPENSION INTRA-ARTICULAR; INTRALESIONAL; INTRAMUSCULAR; SOFT TISSUE at 11:33

## 2024-03-18 ASSESSMENT — PAIN - FUNCTIONAL ASSESSMENT: PAIN_FUNCTIONAL_ASSESSMENT: 0-10

## 2024-03-18 ASSESSMENT — PAIN SCALES - GENERAL: PAINLEVEL_OUTOF10: 0 - NO PAIN
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
## 2024-03-20 ENCOUNTER — PHARMACY VISIT (OUTPATIENT)
Dept: PHARMACY | Facility: CLINIC | Age: 69
End: 2024-03-20
Payer: COMMERCIAL

## 2024-03-20 ENCOUNTER — OFFICE VISIT (OUTPATIENT)
Dept: OTOLARYNGOLOGY | Facility: HOSPITAL | Age: 69
End: 2024-03-20
Payer: COMMERCIAL

## 2024-03-20 VITALS — WEIGHT: 124 LBS | BODY MASS INDEX: 25 KG/M2 | TEMPERATURE: 97 F | HEIGHT: 59 IN

## 2024-03-20 DIAGNOSIS — M26.69 TMJ INFLAMMATION: ICD-10-CM

## 2024-03-20 PROCEDURE — RXMED WILLOW AMBULATORY MEDICATION CHARGE

## 2024-03-20 PROCEDURE — 1036F TOBACCO NON-USER: CPT | Performed by: NURSE PRACTITIONER

## 2024-03-20 PROCEDURE — 1159F MED LIST DOCD IN RCRD: CPT | Performed by: NURSE PRACTITIONER

## 2024-03-20 PROCEDURE — 1160F RVW MEDS BY RX/DR IN RCRD: CPT | Performed by: NURSE PRACTITIONER

## 2024-03-20 PROCEDURE — 99213 OFFICE O/P EST LOW 20 MIN: CPT | Performed by: NURSE PRACTITIONER

## 2024-03-20 PROCEDURE — 99213 OFFICE O/P EST LOW 20 MIN: CPT | Mod: ZK | Performed by: NURSE PRACTITIONER

## 2024-03-20 ASSESSMENT — PATIENT HEALTH QUESTIONNAIRE - PHQ9
SUM OF ALL RESPONSES TO PHQ9 QUESTIONS 1 & 2: 0
2. FEELING DOWN, DEPRESSED OR HOPELESS: NOT AT ALL
1. LITTLE INTEREST OR PLEASURE IN DOING THINGS: NOT AT ALL

## 2024-03-20 NOTE — PROGRESS NOTES
3/20/24  Sandy Smith is a 68 y.o. year old female patient with TMJ  referred for Follow Up In Ent.      : 1955    HPI:  Ms. Smith returns for follow-up after her recent treatment for TMJ.  Patient expressed that although she was prescribed ibuprofen to help with the inflammation, she did not take the medication.  She did incorporate some of the lifestyle interventions which include a mouthguard, warm compress, and some jaw exercises that she was able to find on the Internet.  Patient states she also was seen by her PCP and recently received a tetanus shot; and the she feels that shot has helped with the pain in her jaw.  Patient feels that the TMJ issue has greatly improved, and she is doing very well at this time.    Patient also presents for routine ear exam/cleaning.  She denies using Q-tips, and denies any change in her hearing.      ROS:  Unless mentioned in the HPI, all other systems have been reviewed and ar negative.    Constitutional:   General appearance: Healthy appearing, well-nourished, well groomed.  No acute distress.  Communication: Normal communication without aids or , normal voice quality.  No hoarseness, stridor or stertor.    Psychiatric: Oriented to person, place and time.  Normal mood and affect.    Neurologic: Cranial nerves II-XII grossly intact and symmetric bilaterally.    Head and Face:  Head: Atraumatic with no masses, lesions or scarring.  Face: Normal symmetry, no paralysis, synkinesis or facial tic.  No scars or deformities.  Sinuses: Palpation of the face revealed no sinus tenderness  Salivary glands: No tenderness of the parotid gland, no parotid masses.  No tenderness of the submandibular glands, no submandibular gland masses.  TMJ: Normal, no clicking or pain with palpation.    Eyes: EOMI, PERRL, conjunctiva not edematous or erythematous    Ears: External inspection of ears with no deformity, scars or masses.  Otoscopic examination: Auditory canals with  normal appearance, no cerumen obstruction, erythema or edema.  Tympanic membranes intact without middle ear effusion.  Assessment of hearing with normal clinical speech reception to voice.      Nose: External inspection of nose: No nasal lesions, lacerations or scars.  Septum midline.  No inferior turbinate hypertrophy.  Patent with good air entry bilaterally.    Oral Cavity/Mouth: No masses, lesions or ulcers along the lips, gingival or buccal mucosa.  Floor of the mouth is soft without edema or masses.  Teeth in fair condition.  No masses, lesions or ulcers along the tongue.  Oral cavity and oropharynx mucosa moist.  Hard and soft palate intact and symmetric with no masses or lesions.  Posterior oropharynx patent.  Palate, posterior pharyngeal walls and tonsils normal, symmetric with no masses, lesions or ulcers.      Neck: Normal appearing, symmetric, trachea midline.  No crepitus.  Thyroid: Symmetrical, no enlargement, no tenderness, no nodules.    Lymphatic: No palpable cervical lymphadenopathy, no submandibular lymphadenopathy, no supraclavicular lymphadenopathy.    Cardiovascular: Examination of peripheral vascular system shows no clubbing or cyanosis.    Respiratory: No respiratory distress increased work of breathing.  Inspection of the chest with symmetric chest expansion and normal respiratory effort.    Skin: No rashes in the head or neck        Assessment:   68-year-old female presents for follow-up for recent TMJ dysfunction treatment.  At this time patient expresses that her symptoms have improved and she is doing well.  Her ear exam did not show a need for cleaning/cerumen removal.    Recommendations:   -To clean the ears, wash the external ear with a cloth, but do not insert anything into the ear canal.  -Most cases of ear wax blockage respond to home treatments used to soften wax.  -Debrox over the counter  -You may try placing a few drops of mineral oil or baby oil once a week to help keep the wax  soft. We do NOT recommend placing ANYTHING in the ear canal.  -Doing so may cause wax to be pushed back into the ear canal and stuck. It also risks injury to the ear canal and ear drum.   -We recommend avoiding using cotton tipped applicators, tissues, paper, or any rigid object in the ear canal.   Some people require regular cleanings every year or so to keep the ear canals open.  -Follow up in 3-4 month     Iris Beltran, APRN-CNP

## 2024-03-20 NOTE — PATIENT INSTRUCTIONS
Felix Lunsford to the clinic of Iris Beltran CNP. We are here to assist you through your ENT care at [facility].    My office number is [phone]. This number is the most direct way to communicate with the office. Ev is my  and she answers the office phone from 8am-4pm Mon-Fri. She can help you with many general questions and information. Questions that she cannot answer will be directed appropriately to me. You may need to leave a message. In this case, someone from the team will call you back.    Sometimes other team members will also be involved in your care. This may include dieticians, social workers, speech therapists, medical oncologist or radiation oncologists. I will provide these referrals as needed. Please let me know if you would like to request a specific referral.    I look forward to working with you to meet your healthcare goals.     Recommendations:   -To clean the ears, wash the external ear with a cloth, but do not insert anything into the ear canal.  -Most cases of ear wax blockage respond to home treatments used to soften wax.  -Debrox over the counter  -You may try placing a few drops of mineral oil or baby oil once a week to help keep the wax soft. We do NOT recommend placing ANYTHING in the ear canal.  -Doing so may cause wax to be pushed back into the ear canal and stuck. It also risks injury to the ear canal and ear drum.   -We recommend avoiding using cotton tipped applicators, tissues, paper, or any rigid object in the ear canal.   Some people require regular cleanings every year or so to keep the ear canals open.  -Follow up in 3-4 month

## 2024-03-22 ENCOUNTER — HOSPITAL ENCOUNTER (OUTPATIENT)
Dept: RADIOLOGY | Facility: HOSPITAL | Age: 69
Discharge: HOME | End: 2024-03-22
Payer: COMMERCIAL

## 2024-03-22 DIAGNOSIS — Z12.31 BREAST CANCER SCREENING BY MAMMOGRAM: ICD-10-CM

## 2024-03-22 DIAGNOSIS — Z12.39 BREAST SCREENING: ICD-10-CM

## 2024-03-25 DIAGNOSIS — I42.8 OTHER CARDIOMYOPATHY (MULTI): ICD-10-CM

## 2024-03-27 ENCOUNTER — APPOINTMENT (OUTPATIENT)
Dept: CARDIOLOGY | Facility: CLINIC | Age: 69
End: 2024-03-27
Payer: COMMERCIAL

## 2024-03-27 DIAGNOSIS — I42.0 DILATED CARDIOMYOPATHY (MULTI): Primary | ICD-10-CM

## 2024-03-27 PROCEDURE — RXMED WILLOW AMBULATORY MEDICATION CHARGE

## 2024-03-28 RX ORDER — METOPROLOL SUCCINATE 25 MG/1
25 TABLET, EXTENDED RELEASE ORAL DAILY
Qty: 30 TABLET | Refills: 11 | Status: SHIPPED | OUTPATIENT
Start: 2024-03-28

## 2024-03-29 ENCOUNTER — PHARMACY VISIT (OUTPATIENT)
Dept: PHARMACY | Facility: CLINIC | Age: 69
End: 2024-03-29
Payer: COMMERCIAL

## 2024-03-29 PROCEDURE — RXMED WILLOW AMBULATORY MEDICATION CHARGE

## 2024-04-10 PROBLEM — G56.01 CARPAL TUNNEL SYNDROME, RIGHT UPPER LIMB: Status: ACTIVE | Noted: 2024-04-10

## 2024-04-12 ENCOUNTER — TELEMEDICINE CLINICAL SUPPORT (OUTPATIENT)
Dept: PREADMISSION TESTING | Facility: HOSPITAL | Age: 69
End: 2024-04-12
Payer: COMMERCIAL

## 2024-04-12 VITALS — BODY MASS INDEX: 24.6 KG/M2 | WEIGHT: 122 LBS | HEIGHT: 59 IN

## 2024-04-12 NOTE — PREPROCEDURE INSTRUCTIONS
Current Medications   Medication Instructions    acetaminophen (TylenoL) 325 mg tablet Continue until night before surgery    calcium carbonate (Tums) 200 mg calcium chewable tablet Stop 7 days before surgery    cetirizine (ZyrTEC) 10 mg tablet Stop 1 day before surgery    cholecalciferol, vitamin D3, 10 mcg (400 unit) capsule Stop 7 days before surgery    ciclopirox (Penlac) 8 % solution Stop 1 day before surgery    empagliflozin (Jardiance) 10 mg Hold 3 days prior    estradiol (Estrace) 0.01 % (0.1 mg/gram) vaginal cream Stop 1 day before surgery    hydrALAZINE (Apresoline) 10 mg tablet Take morning of surgery with sip of water, no other fluids    isosorbide dinitrate (Isordil) 20 mg tablet Take morning of surgery with sip of water, no other fluids    metoprolol succinate XL (Toprol-XL) 25 mg 24 hr tablet Take morning of surgery with sip of water, no other fluids    NaCl-NaHCO3-hyaluron sod-aloe (NasogeL) 0.9 % spray gel Continue until night before surgery    spironolactone (Aldactone) 25 mg tablet Take morning of surgery with sip of water, no other fluids                       NPO Instructions:    Do not eat any food after midnight the night before your surgery/procedure.    Additional Instructions:     Seven/Six Days before Surgery:  Review your medication instructions, stop indicated medications  Five Days before Surgery:  Review your medication instructions, stop indicated medications  Three Days before Surgery:  Review your medication instructions, stop indicated medications  The Day before Surgery:  Review your medication instructions, stop indicated medications  You will be contacted regarding the time of your arrival to facility and surgery time  Do not eat any food after Midnight  Day of Surgery:  Review your medication instructions, take indicated medications  Wear  comfortable loose fitting clothing  Do not use moisturizers, creams, lotions or perfume  All jewelry and valuables should be left at  home  PAT PRE-OPERATIVE INSTRUCTIONS    [facility]  [address] [place] [address]  [phone]    Please let your surgeon know if:      You develop:  Open sores, shingles, burning or painful urination as these may increase your risk of an infection.   Fever=100.4 or greater   New or worsening cold or flu symptoms ( cough, shortness of breath, sore throat, respiratory distress, headache, fatigue, GI symptoms)   You no longer wish to have the surgery.   Any other personal circumstances change that may lead to the need to cancel or defer this surgery-such as being sick or getting admitted to any hospital within one week of your planned procedure.    Your contact details change, such as a change of address or phone number.    Starting now:     Please DO NOT drink alcohol or smoke for 24 hours before surgery. It is well known that quitting smoking can make a huge difference to your health and recovery from surgery. The longer you abstain from smoking, the better your chances of a healthy recovery. If you need help with quitting, call 1-800-QUIT-NOW to be connected to a trained counselor who will discuss the best methods to help you quit.     Before your surgery:    Please stop all supplements/ vitamins 7 days prior to surgery (or as directed by your surgeon).   Please stop taking NSAID pain medicine such as Advil, Ibuprofen, and Motrin 7 days before surgery.    If you develop any fever, cough, cold, rashes, cuts, scratches, scrapes, urinary symptoms or infection anywhere on your body (including teeth and gums) prior to surgery, please call your surgeon’s office as soon as possible. This may require treatment to reduce the chance of cancellation on the day of surgery.    The day before your surgery:   DIET- Do not eat any food after MIDNIGHT.   Get a good night’s rest.  Use the special soap for bathing if you have been instructed to use one.    Scheduled surgery times may  change and you will be notified if this occurs - please check your personal voicemail for any updates.     On the morning of surgery:   Wear comfortable, loose fitting clothes which open in the front.   Shower and please do not wear moisturizers, creams, lotions, deodorants, makeup or perfume.    Please bring with you to surgery:   Photo ID and insurance card   Current list of medicines and allergies   Pacemaker/ Defibrillator/Heart stent cards as well as remote controls for implanted devices    CPAP machine and mask    Slings/ splints/ crutches   A copy of your complete advanced directive/DHPOA.    Please do NOT bring with you to surgery:   All jewelry and valuables should be left at home.   Prosthetic devices such as contact lenses, glasses, hearing aids, dentures, eyelash extensions, hairpins and body piercings must be removed prior to going in to the surgical suite. If you have a case for these items, please bring it with you on surgery day.    Patients under the age of 18: A responsible adult must be present and remaining in the building throughout the surgical visit.    After outpatient surgery:   A responsible adult MUST accompany you at the time of discharge and stay with you for 24 hours after your surgery. You may NOT drive yourself home after surgery.    Do not drive, operate machinery, make critical decisions or do activities that require co-ordination or balance until after a night’s sleep.   Do not drink alcoholic beverages for 24 hours.   Instructions for resuming your medications will be provided by your surgeon.    CALL YOUR DOCTOR AFTER SURGERY IF YOU HAVE:     Chills and/or a fever of 101° F or higher.    Redness, swelling, pus or drainage from your surgical wound or a bad smell from the wound.    Lightheadedness, fainting or confusion.    Persistent vomiting (throwing up) and are not able to eat or drink for 12 hours.    Three or more loose, watery bowel movements in 24 hours (diarrhea).    Difficulty or pain while urinating( after non-urological surgery)    Pain and swelling in your legs, especially if it is only on one side.    Difficulty breathing or are breathing faster than normal.    Any new concerning symptoms.      Reviewed pre-op instructions with patient, states understanding and denies further questions at this time.      Take Care

## 2024-04-15 ENCOUNTER — DOCUMENTATION (OUTPATIENT)
Dept: CARDIOLOGY | Facility: CLINIC | Age: 69
End: 2024-04-15
Payer: COMMERCIAL

## 2024-04-15 NOTE — PROGRESS NOTES
Re: Cardiac preoperative risk evaluation anticipation of carpal tunnel release.    To whom it may concern:    Ms. Sandy Smith date of birth 1955 should be considered an acceptable cardiovascular risk for her anticipated carpal tunnel surgery.  No further preoperative risk evaluation is recommended at this point.  Please feel free to call me for any concerns or questions.    Sincerely,    Dr. Lupillo Haddad MD

## 2024-04-17 ENCOUNTER — DOCUMENTATION (OUTPATIENT)
Dept: CARE COORDINATION | Facility: CLINIC | Age: 69
End: 2024-04-17
Payer: COMMERCIAL

## 2024-04-17 DIAGNOSIS — L60.8 TOENAIL DEFORMITY: Primary | ICD-10-CM

## 2024-04-17 DIAGNOSIS — I10 HYPERTENSION, UNSPECIFIED TYPE: Primary | ICD-10-CM

## 2024-04-17 PROCEDURE — RXMED WILLOW AMBULATORY MEDICATION CHARGE

## 2024-04-17 RX ORDER — CICLOPIROX 80 MG/ML
SOLUTION TOPICAL NIGHTLY
Qty: 6.6 ML | Refills: 0 | Status: SHIPPED | OUTPATIENT
Start: 2024-04-17 | End: 2024-05-16 | Stop reason: SDUPTHER

## 2024-04-17 NOTE — PROGRESS NOTES
Review by CM. Patient has had no readmissions in past 30 days, with no need identified. No further outreach.

## 2024-04-19 PROCEDURE — RXMED WILLOW AMBULATORY MEDICATION CHARGE

## 2024-04-19 RX ORDER — HYDRALAZINE HYDROCHLORIDE 10 MG/1
10 TABLET, FILM COATED ORAL EVERY 8 HOURS
Qty: 540 TABLET | Refills: 1 | Status: SHIPPED | OUTPATIENT
Start: 2024-04-19 | End: 2025-04-18

## 2024-04-21 PROCEDURE — RXMED WILLOW AMBULATORY MEDICATION CHARGE

## 2024-04-23 ENCOUNTER — PHARMACY VISIT (OUTPATIENT)
Dept: PHARMACY | Facility: CLINIC | Age: 69
End: 2024-04-23
Payer: COMMERCIAL

## 2024-04-24 ENCOUNTER — PRE-ADMISSION TESTING (OUTPATIENT)
Dept: PREADMISSION TESTING | Facility: HOSPITAL | Age: 69
End: 2024-04-24
Payer: COMMERCIAL

## 2024-04-24 VITALS
WEIGHT: 125.6 LBS | BODY MASS INDEX: 25.32 KG/M2 | DIASTOLIC BLOOD PRESSURE: 69 MMHG | OXYGEN SATURATION: 98 % | HEART RATE: 86 BPM | TEMPERATURE: 98.6 F | HEIGHT: 59 IN | SYSTOLIC BLOOD PRESSURE: 114 MMHG

## 2024-04-24 PROCEDURE — 99203 OFFICE O/P NEW LOW 30 MIN: CPT

## 2024-04-24 ASSESSMENT — DUKE ACTIVITY SCORE INDEX (DASI)
CAN YOU DO HEAVY WORK AROUND THE HOUSE LIKE SCRUBBING FLOORS OR LIFTING AND MOVING HEAVY FURNITURE: YES
CAN YOU CLIMB A FLIGHT OF STAIRS OR WALK UP A HILL: YES
CAN YOU DO MODERATE WORK AROUND THE HOUSE LIKE VACUUMING, SWEEPING FLOORS OR CARRYING GROCERIES: YES
CAN YOU WALK INDOORS, SUCH AS AROUND YOUR HOUSE: YES
CAN YOU DO YARD WORK LIKE RAKING LEAVES, WEEDING OR PUSHING A MOWER: YES
CAN YOU HAVE SEXUAL RELATIONS: YES
DASI METS SCORE: 9
CAN YOU PARTICIPATE IN STRENOUS SPORTS LIKE SWIMMING, SINGLES TENNIS, FOOTBALL, BASKETBALL, OR SKIING: NO
CAN YOU RUN A SHORT DISTANCE: YES
CAN YOU TAKE CARE OF YOURSELF (EAT, DRESS, BATHE, OR USE TOILET): YES
TOTAL_SCORE: 50.7
CAN YOU PARTICIPATE IN MODERATE RECREATIONAL ACTIVITIES LIKE GOLF, BOWLING, DANCING, DOUBLES TENNIS OR THROWING A BASEBALL OR FOOTBALL: YES
CAN YOU DO LIGHT WORK AROUND THE HOUSE LIKE DUSTING OR WASHING DISHES: YES
CAN YOU WALK A BLOCK OR TWO ON LEVEL GROUND: YES

## 2024-04-24 ASSESSMENT — ENCOUNTER SYMPTOMS
PSYCHIATRIC NEGATIVE: 1
GASTROINTESTINAL NEGATIVE: 1
HEMATOLOGIC/LYMPHATIC NEGATIVE: 1
EYES NEGATIVE: 1
CONSTITUTIONAL NEGATIVE: 1
CARDIOVASCULAR NEGATIVE: 1
NUMBNESS: 1
ENDOCRINE NEGATIVE: 1
RESPIRATORY NEGATIVE: 1

## 2024-04-24 ASSESSMENT — PAIN - FUNCTIONAL ASSESSMENT: PAIN_FUNCTIONAL_ASSESSMENT: 0-10

## 2024-04-24 ASSESSMENT — PAIN SCALES - GENERAL: PAINLEVEL_OUTOF10: 0 - NO PAIN

## 2024-04-24 NOTE — CPM/PAT H&P
CPM/PAT Evaluation       Name: Sandy Smith (Sandy Smith)  /Age: 1955/68 y.o.     In-Person       Chief Complaint: Left Hand Carpal Tunnel Syndrome    HPI    68-year-old female with left hand pain.  She reports pain, numbness and tingling in her left hand worsening over the last several months.  Patient reports that this radiates up to her elbow.  Patient reports that this is a constant discomfort.  She denies taking any medication to relieve this pain.  Patient states that this pain is worse at night.  She reports that she does occasionally wear a brace without significant relief.  Patient reports that she attempted injections 1 time but that it wore off quickly.  Patient denies any recent chest pain, pressure or palpitations.  Denies shortness of breath.  Plan for left endoscopic carpal tunnel release with Dr. Hastings on 2024.    Past Medical History:   Diagnosis Date    Cardiomyopathy, unspecified (Multi)     CHF (congestive heart failure) (Multi)     CKD (chronic kidney disease)     Encounter for general adult medical examination without abnormal findings 2022    Encounter for initial preventive physical examination covered by Medicare    Hypertension     Otitis media, unspecified, right ear 2014    Acute right otitis media    Personal history of other diseases of the digestive system 2013    History of esophageal reflux    Personal history of other diseases of the musculoskeletal system and connective tissue 2013    History of low back pain    Personal history of other specified conditions 2013    History of angioedema       Past Surgical History:   Procedure Laterality Date    BONE GRAFT Left     CARDIAC DEFIBRILLATOR PLACEMENT      COLONOSCOPY  2013    Colonoscopy (Fiberoptic)    CT ANGIO CORONARY ART WITH HEARTFLOW IF SCORE >30%  2018    CT HEART CORONARY ANGIOGRAM 2018 [facility] [place] ANCILLARY LEGACY    CT ANGIO NECK  2022    CT NECK ANGIO W  AND WO IV CONTRAST 1/29/2022 [facility] CLINICAL LEGACY    KNEE SURGERY Left     OTHER SURGICAL HISTORY  02/08/2021    Pacemaker insertion    OTHER SURGICAL HISTORY  11/08/2018    Umbilical hernia repair    OTHER SURGICAL HISTORY  12/05/2013    Wrist Surgery       Patient  has no history on file for sexual activity.    Family History   Problem Relation Name Age of Onset    Aneurysm Mother      Heart disease Mother      Hyperlipidemia Mother      Hypertension Mother      Heart disease Father      Hypertension Father      Other (cardiac disorder) Sister      Coronary artery disease Sister      Other (cardiac disorder) Brother      Coronary artery disease Brother         Allergies   Allergen Reactions    Cephalexin Unknown    Lisinopril Angioedema       Current Outpatient Medications   Medication Sig Dispense Refill    acetaminophen (TylenoL) 325 mg tablet Take 2 tablets (650 mg) by mouth every 6 hours if needed for mild pain (1 - 3) or fever (temp greater than 38.0 C). 30 tablet 0    calcium carbonate (Tums) 200 mg calcium chewable tablet Chew 1 tablet (500 mg) 2 times a day. 60 tablet 2    cetirizine (ZyrTEC) 10 mg tablet Take 1 tablet (10 mg) by mouth once daily as needed.      cholecalciferol, vitamin D3, 10 mcg (400 unit) capsule TAKE 1 CAPSULE Daily      ciclopirox (Penlac) 8 % solution Apply topically once daily at bedtime. 6.6 mL 0    empagliflozin (Jardiance) 10 mg TAKE 1 TABLET BY MOUTH ONCE DAILY 90 tablet 2    estradiol (Estrace) 0.01 % (0.1 mg/gram) vaginal cream Insert 0.5 Applicatorfuls (2 g) into the vagina once daily. Apply to vagina nightly for 1 week then every Monday/Wednesday/Friday. 42.5 g 11    hydrALAZINE (Apresoline) 10 mg tablet TAKE 1 TABLET BY MOUTH EVERY 8 HOURS 540 tablet 1    isosorbide dinitrate (Isordil) 20 mg tablet TAKE 1 TABLET BY MOUTH THREE TIMES A  tablet 2    lidocaine (Xylocaine) 5 % cream cream Apply to area of pain once daily as needed.      methocarbamol (Robaxin) 500 mg  "tablet Take 1-2 tablets as needed at bedtime for muscle tightness, spasms      metoprolol succinate XL (Toprol-XL) 25 mg 24 hr tablet Take 1 tablet (25 mg) by mouth once daily. 30 tablet 11    NaCl-NaHCO3-hyaluron sod-aloe (NasogeL) 0.9 % spray gel USE AS DIRECTED.      psyllium husk, aspartame, (Metamucil Sugar-Free, aspart,) 3.4 gram/5.8 gram powder Use as directed 1-3 times daily as needed to maintain regular bowel movements.      sodium chloride-aloe vera spray,non-aerosol 1 spray each nostril at bedtime      spironolactone (Aldactone) 25 mg tablet TAKE 1 TABLET (25 MG) BY MOUTH ONCE DAILY. 90 tablet 1     No current facility-administered medications for this visit.       Review of Systems   Constitutional: Negative.   HENT: Negative.     Eyes: Negative.    Cardiovascular: Negative.    Respiratory: Negative.     Endocrine: Negative.    Hematologic/Lymphatic: Negative.    Skin: Negative.    Gastrointestinal: Negative.    Genitourinary: Negative.    Neurological:  Positive for numbness.        Left hand numbness and tingling   Psychiatric/Behavioral: Negative.            Physical Exam  Cardiovascular:      Rate and Rhythm: Normal rate and regular rhythm.      Heart sounds: No murmur heard.     No friction rub. No gallop.   Pulmonary:      Effort: Pulmonary effort is normal.      Breath sounds: Normal breath sounds. No wheezing, rhonchi or rales.   Abdominal:      General: Bowel sounds are normal.   Musculoskeletal:      Cervical back: Normal range of motion.      Right lower leg: No edema.      Left lower leg: No edema.   Skin:     General: Skin is warm and dry.   Neurological:      Mental Status: She is alert and oriented to person, place, and time.   Psychiatric:         Mood and Affect: Mood normal.         Behavior: Behavior normal.          PAT AIRWAY:   Airway:     Mallampati::  II    Neck ROM::  Full      /69   Pulse 86   Temp 37 °C (98.6 °F) (Temporal)   Ht 1.499 m (4' 11\")   Wt 57 kg (125 lb 9.6 " "oz)   SpO2 98%   BMI 25.37 kg/m²     CHADS 2 score: 4  DASI score: 50.7  METS score: 9  Revised cardiac risk index: 2  ASA: III      Assessment and Plan:     Left Hand Carpal Tunnel: Plan for left endoscopic carpal tunnel release on 4/25/2024  History of NICM: s/p ICD placement 2021, recent echocardiogram in 08/2023 showed preserved ejection fraction 50-55%  Hypertension: patient takes hydralazine, metoprolol and spironolactone  Chronic Kidney Disease: Labs on 3/3/2024 showed creatinine 1.11 which is patient's baseline  GERD: Controlled with as needed TUMs  Hepatitis C: Patient states that follow up labs were performed, and that she was \"cleared\" and did not require treatment     Cardiac clearance found in Epic notes  Labs completed 3/3/2024  EKG: 3/2/2024        "

## 2024-04-24 NOTE — H&P (VIEW-ONLY)
CPM/PAT Evaluation       Name: Sandy Smith (Sandy Smith)  /Age: 1955/68 y.o.     In-Person       Chief Complaint: Left Hand Carpal Tunnel Syndrome    HPI    68-year-old female with left hand pain.  She reports pain, numbness and tingling in her left hand worsening over the last several months.  Patient reports that this radiates up to her elbow.  Patient reports that this is a constant discomfort.  She denies taking any medication to relieve this pain.  Patient states that this pain is worse at night.  She reports that she does occasionally wear a brace without significant relief.  Patient reports that she attempted injections 1 time but that it wore off quickly.  Patient denies any recent chest pain, pressure or palpitations.  Denies shortness of breath.  Plan for left endoscopic carpal tunnel release with Dr. Hastings on 2024.    Past Medical History:   Diagnosis Date    Cardiomyopathy, unspecified (Multi)     CHF (congestive heart failure) (Multi)     CKD (chronic kidney disease)     Encounter for general adult medical examination without abnormal findings 2022    Encounter for initial preventive physical examination covered by Medicare    Hypertension     Otitis media, unspecified, right ear 2014    Acute right otitis media    Personal history of other diseases of the digestive system 2013    History of esophageal reflux    Personal history of other diseases of the musculoskeletal system and connective tissue 2013    History of low back pain    Personal history of other specified conditions 2013    History of angioedema       Past Surgical History:   Procedure Laterality Date    BONE GRAFT Left     CARDIAC DEFIBRILLATOR PLACEMENT      COLONOSCOPY  2013    Colonoscopy (Fiberoptic)    CT ANGIO CORONARY ART WITH HEARTFLOW IF SCORE >30%  2018    [place]ART [place] [facility] [place]    CT ANGIO NECK  2022    CT NECK ANGIO W  AND WO IV CONTRAST 1/29/2022 [facility] CLINICAL LEGACY    KNEE SURGERY Left     OTHER SURGICAL HISTORY  02/08/2021    Pacemaker insertion    OTHER SURGICAL HISTORY  11/08/2018    Umbilical hernia repair    OTHER SURGICAL HISTORY  12/05/2013    Wrist Surgery       Patient  has no history on file for sexual activity.    Family History   Problem Relation Name Age of Onset    Aneurysm Mother      Heart disease Mother      Hyperlipidemia Mother      Hypertension Mother      Heart disease Father      Hypertension Father      Other (cardiac disorder) Sister      Coronary artery disease Sister      Other (cardiac disorder) Brother      Coronary artery disease Brother         Allergies   Allergen Reactions    Cephalexin Unknown    Lisinopril Angioedema       Current Outpatient Medications   Medication Sig Dispense Refill    acetaminophen (TylenoL) 325 mg tablet Take 2 tablets (650 mg) by mouth every 6 hours if needed for mild pain (1 - 3) or fever (temp greater than 38.0 C). 30 tablet 0    calcium carbonate (Tums) 200 mg calcium chewable tablet Chew 1 tablet (500 mg) 2 times a day. 60 tablet 2    cetirizine (ZyrTEC) 10 mg tablet Take 1 tablet (10 mg) by mouth once daily as needed.      cholecalciferol, vitamin D3, 10 mcg (400 unit) capsule TAKE 1 CAPSULE Daily      ciclopirox (Penlac) 8 % solution Apply topically once daily at bedtime. 6.6 mL 0    empagliflozin (Jardiance) 10 mg TAKE 1 TABLET BY MOUTH ONCE DAILY 90 tablet 2    estradiol (Estrace) 0.01 % (0.1 mg/gram) vaginal cream Insert 0.5 Applicatorfuls (2 g) into the vagina once daily. Apply to vagina nightly for 1 week then every Monday/Wednesday/Friday. 42.5 g 11    hydrALAZINE (Apresoline) 10 mg tablet TAKE 1 TABLET BY MOUTH EVERY 8 HOURS 540 tablet 1    isosorbide dinitrate (Isordil) 20 mg tablet TAKE 1 TABLET BY MOUTH THREE TIMES A  tablet 2    lidocaine (Xylocaine) 5 % cream cream Apply to area of pain once daily as needed.      methocarbamol (Robaxin) 500 mg  "tablet Take 1-2 tablets as needed at bedtime for muscle tightness, spasms      metoprolol succinate XL (Toprol-XL) 25 mg 24 hr tablet Take 1 tablet (25 mg) by mouth once daily. 30 tablet 11    NaCl-NaHCO3-hyaluron sod-aloe (NasogeL) 0.9 % spray gel USE AS DIRECTED.      psyllium husk, aspartame, (Metamucil Sugar-Free, aspart,) 3.4 gram/5.8 gram powder Use as directed 1-3 times daily as needed to maintain regular bowel movements.      sodium chloride-aloe vera spray,non-aerosol 1 spray each nostril at bedtime      spironolactone (Aldactone) 25 mg tablet TAKE 1 TABLET (25 MG) BY MOUTH ONCE DAILY. 90 tablet 1     No current facility-administered medications for this visit.       Review of Systems   Constitutional: Negative.   HENT: Negative.     Eyes: Negative.    Cardiovascular: Negative.    Respiratory: Negative.     Endocrine: Negative.    Hematologic/Lymphatic: Negative.    Skin: Negative.    Gastrointestinal: Negative.    Genitourinary: Negative.    Neurological:  Positive for numbness.        Left hand numbness and tingling   Psychiatric/Behavioral: Negative.            Physical Exam  Cardiovascular:      Rate and Rhythm: Normal rate and regular rhythm.      Heart sounds: No murmur heard.     No friction rub. No gallop.   Pulmonary:      Effort: Pulmonary effort is normal.      Breath sounds: Normal breath sounds. No wheezing, rhonchi or rales.   Abdominal:      General: Bowel sounds are normal.   Musculoskeletal:      Cervical back: Normal range of motion.      Right lower leg: No edema.      Left lower leg: No edema.   Skin:     General: Skin is warm and dry.   Neurological:      Mental Status: She is alert and oriented to person, place, and time.   Psychiatric:         Mood and Affect: Mood normal.         Behavior: Behavior normal.          PAT AIRWAY:   Airway:     Mallampati::  II    Neck ROM::  Full      /69   Pulse 86   Temp 37 °C (98.6 °F) (Temporal)   Ht 1.499 m (4' 11\")   Wt 57 kg (125 lb 9.6 " "oz)   SpO2 98%   BMI 25.37 kg/m²     CHADS 2 score: 4  DASI score: 50.7  METS score: 9  Revised cardiac risk index: 2  ASA: III      Assessment and Plan:     Left Hand Carpal Tunnel: Plan for left endoscopic carpal tunnel release on 4/25/2024  History of NICM: s/p ICD placement 2021, recent echocardiogram in 08/2023 showed preserved ejection fraction 50-55%  Hypertension: patient takes hydralazine, metoprolol and spironolactone  Chronic Kidney Disease: Labs on 3/3/2024 showed creatinine 1.11 which is patient's baseline  GERD: Controlled with as needed TUMs  Hepatitis C: Patient states that follow up labs were performed, and that she was \"cleared\" and did not require treatment     Cardiac clearance found in Epic notes  Labs completed 3/3/2024  EKG: 3/2/2024        "

## 2024-04-24 NOTE — PREPROCEDURE INSTRUCTIONS
Medication List            Accurate as of April 24, 2024 12:21 PM. Always use your most recent med list.                acetaminophen 325 mg tablet  Commonly known as: TylenoL  Take 2 tablets (650 mg) by mouth every 6 hours if needed for mild pain (1 - 3) or fever (temp greater than 38.0 C).  Notes to patient: MAY TAKE MORNING OF SURGERY IF NEEDED     calcium carbonate 200 mg calcium chewable tablet  Commonly known as: Tums  Chew 1 tablet (500 mg) 2 times a day.  Medication Adjustments for Surgery: Stop 7 days before surgery     cetirizine 10 mg tablet  Commonly known as: ZyrTEC  Medication Adjustments for Surgery: Continue until night before surgery     cholecalciferol 400 unit capsule  Commonly known as: Vitamin D-3  Medication Adjustments for Surgery: Stop 7 days before surgery     ciclopirox 8 % solution  Commonly known as: Penlac  Apply topically once daily at bedtime.     estradiol 0.01 % (0.1 mg/gram) vaginal cream  Commonly known as: Estrace  Insert 0.5 Applicatorfuls (2 g) into the vagina once daily. Apply to vagina nightly for 1 week then every Monday/Wednesday/Friday.     hydrALAZINE 10 mg tablet  Commonly known as: Apresoline  TAKE 1 TABLET BY MOUTH EVERY 8 HOURS  Medication Adjustments for Surgery: Take morning of surgery with sip of water, no other fluids     isosorbide dinitrate 20 mg tablet  Commonly known as: Isordil  TAKE 1 TABLET BY MOUTH THREE TIMES A DAY  Medication Adjustments for Surgery: Take morning of surgery with sip of water, no other fluids     Jardiance 10 mg  Generic drug: empagliflozin  TAKE 1 TABLET BY MOUTH ONCE DAILY  Medication Adjustments for Surgery: Stop 3 days before surgery     lidocaine 5 % cream cream  Commonly known as: Xylocaine     Metamucil Sugar-Free (aspart) 3.4 gram/5.8 gram powder  Generic drug: psyllium husk (aspartame)     methocarbamol 500 mg tablet  Commonly known as: Robaxin     metoprolol succinate XL 25 mg 24 hr tablet  Commonly known as: Toprol-XL  Take 1  tablet (25 mg) by mouth once daily.  Medication Adjustments for Surgery: Take morning of surgery with sip of water, no other fluids     NasogeL 0.9 % spray gel  Generic drug: NaCl-NaHCO3-hyaluron sod-aloe     sodium chloride-aloe vera spray,non-aerosol     spironolactone 25 mg tablet  Commonly known as: Aldactone  TAKE 1 TABLET (25 MG) BY MOUTH ONCE DAILY.  Medication Adjustments for Surgery: Take morning of surgery with sip of water, no other fluids                              NPO Instructions:    Do not eat any food after midnight the night before your surgery/procedure.      Preoperative Fasting Guidelines    Why must I stop eating and drinking near surgery time?  With sedation, food or liquid in your stomach can enter your lungs causing serious complications  Increases nausea and vomiting    When do I need to stop eating and drinking before my surgery?  Do not eat any food after midnight the night before your surgery/procedure.  You may have up to 13.5 ounces of clear liquid until TWO hours before your instructed arrival time to the hospital.  This includes water, black tea/coffee, (no milk or cream) apple juice, and electrolyte drinks (Gatorade)  You may chew gum until TWO hours before your surgery/procedure  Additional Instructions:     Day of Surgery:  Wear  comfortable loose fitting clothing  Do not use moisturizers, creams, lotions or perfume  All jewelry and valuables should be left at home  PAT DISCHARGE INSTRUCTIONS    Please call the Same Day Surgery (SDS) Department of the hospital where your procedure will be performed after 2:00 PM the day before your surgery. If you are scheduled on a Monday, or a Tuesday following a Monday holiday, you will need to call on the last business day prior to your surgery.    [facility]  [id number] [place] [address], [address] [phone]    [address]  [address]  [phone]    [facility]  [address] [place] [address]  [phone]    Please let your surgeon know if:      You develop any open sores, shingles, burning or painful urination as these may increase your risk of an infection.   You no longer wish to have the surgery.   Any other personal circumstances change that may lead to the need to cancel or defer this surgery-such as being sick or getting admitted to any hospital within one week of your planned procedure.    Your contact details change, such as a change of address or phone number.    Starting now:     Please DO NOT drink alcohol or smoke for 24 hours before surgery. It is well known that quitting smoking can make a huge difference to your health and recovery from surgery. The longer you abstain from smoking, the better your chances of a healthy recovery. If you need help with quitting, call 2-186-QUIT-NOW to be connected to a trained counselor who will discuss the best methods to help you quit.     Before your surgery:    Please stop all supplements 7 days prior to surgery. Or as directed by your surgeon.   Please stop taking NSAID pain medicine such as Advil and Motrin 7 days before surgery.    If you develop any fever, cough, cold, rashes, cuts, scratches, scrapes, urinary symptoms or infection anywhere on your body (including teeth and gums) prior to surgery, please call your surgeon’s office as soon as possible. This may require treatment to reduce the chance of cancellation on the day of surgery.    The day before your surgery:   DIET- Please follow the diet instructions at the top of your packet.   Get a good night’s rest.  Use the special soap for bathing if you have been instructed to use one.    Scheduled surgery times may change and you will be notified if this occurs - please check your personal voicemail for any updates.     On the morning of surgery:   Wear comfortable, loose fitting clothes  which open in the front. Please do not wear moisturizers, creams, lotions, makeup or perfume.    Please bring with you to surgery:   Photo ID and insurance card   Current list of medicines and allergies   Pacemaker/ Defibrillator/Heart stent cards   CPAP machine and mask    Slings/ splints/ crutches   A copy of your complete advanced directive/DHPOA.    Please do NOT bring with you to surgery:   All jewelry and valuables should be left at home.   Prosthetic devices such as contact lenses, hearing aids, dentures, eyelash extensions, hairpins and body piercings must be removed prior to going in to the surgical suite.    After outpatient surgery:   A responsible adult MUST accompany you at the time of discharge and stay with you for 24 hours after your surgery. You may NOT drive yourself home after surgery.    Do not drive, operate machinery, make critical decisions or do activities that require co-ordination or balance until after a night’s sleep.   Do not drink alcoholic beverages for 24 hours.   Instructions for resuming your medications will be provided by your surgeon.    CALL YOUR DOCTOR AFTER SURGERY IF YOU HAVE:     Chills and/or a fever of 101° F or higher.    Redness, swelling, pus or drainage from your surgical wound or a bad smell from the wound.    Lightheadedness, fainting or confusion.    Persistent vomiting (throwing up) and are not able to eat or drink for 12 hours.    Three or more loose, watery bowel movements in 24 hours (diarrhea).   Difficulty or pain while urinating( after non-urological surgery)    Pain and swelling in your legs, especially if it is only on one side.    Difficulty breathing or are breathing faster than normal.    Any new concerning symptoms.

## 2024-04-25 ENCOUNTER — ANESTHESIA EVENT (OUTPATIENT)
Dept: OPERATING ROOM | Facility: HOSPITAL | Age: 69
End: 2024-04-25
Payer: COMMERCIAL

## 2024-04-25 ENCOUNTER — ANESTHESIA (OUTPATIENT)
Dept: OPERATING ROOM | Facility: HOSPITAL | Age: 69
End: 2024-04-25
Payer: COMMERCIAL

## 2024-04-25 ENCOUNTER — PHARMACY VISIT (OUTPATIENT)
Dept: PHARMACY | Facility: CLINIC | Age: 69
End: 2024-04-25
Payer: COMMERCIAL

## 2024-04-25 ENCOUNTER — HOSPITAL ENCOUNTER (OUTPATIENT)
Facility: HOSPITAL | Age: 69
Setting detail: OUTPATIENT SURGERY
Discharge: HOME | End: 2024-04-25
Attending: STUDENT IN AN ORGANIZED HEALTH CARE EDUCATION/TRAINING PROGRAM | Admitting: STUDENT IN AN ORGANIZED HEALTH CARE EDUCATION/TRAINING PROGRAM
Payer: COMMERCIAL

## 2024-04-25 VITALS
DIASTOLIC BLOOD PRESSURE: 76 MMHG | HEART RATE: 61 BPM | TEMPERATURE: 97.3 F | RESPIRATION RATE: 14 BRPM | HEIGHT: 59 IN | SYSTOLIC BLOOD PRESSURE: 114 MMHG | BODY MASS INDEX: 25.37 KG/M2 | OXYGEN SATURATION: 96 %

## 2024-04-25 DIAGNOSIS — G56.01 CARPAL TUNNEL SYNDROME, RIGHT UPPER LIMB: ICD-10-CM

## 2024-04-25 DIAGNOSIS — R11.0 NAUSEA: ICD-10-CM

## 2024-04-25 DIAGNOSIS — G89.18 POST-OP PAIN: Primary | ICD-10-CM

## 2024-04-25 PROBLEM — I50.9 CHF (CONGESTIVE HEART FAILURE) (MULTI): Status: ACTIVE | Noted: 2024-04-25

## 2024-04-25 PROCEDURE — 7100000002 HC RECOVERY ROOM TIME - EACH INCREMENTAL 1 MINUTE: Performed by: STUDENT IN AN ORGANIZED HEALTH CARE EDUCATION/TRAINING PROGRAM

## 2024-04-25 PROCEDURE — 3700000002 HC GENERAL ANESTHESIA TIME - EACH INCREMENTAL 1 MINUTE: Performed by: STUDENT IN AN ORGANIZED HEALTH CARE EDUCATION/TRAINING PROGRAM

## 2024-04-25 PROCEDURE — 2500000004 HC RX 250 GENERAL PHARMACY W/ HCPCS (ALT 636 FOR OP/ED): Performed by: STUDENT IN AN ORGANIZED HEALTH CARE EDUCATION/TRAINING PROGRAM

## 2024-04-25 PROCEDURE — 2500000004 HC RX 250 GENERAL PHARMACY W/ HCPCS (ALT 636 FOR OP/ED): Mod: JZ | Performed by: STUDENT IN AN ORGANIZED HEALTH CARE EDUCATION/TRAINING PROGRAM

## 2024-04-25 PROCEDURE — 7100000001 HC RECOVERY ROOM TIME - INITIAL BASE CHARGE: Performed by: STUDENT IN AN ORGANIZED HEALTH CARE EDUCATION/TRAINING PROGRAM

## 2024-04-25 PROCEDURE — 2500000004 HC RX 250 GENERAL PHARMACY W/ HCPCS (ALT 636 FOR OP/ED): Performed by: NURSE ANESTHETIST, CERTIFIED REGISTERED

## 2024-04-25 PROCEDURE — 3600000003 HC OR TIME - INITIAL BASE CHARGE - PROCEDURE LEVEL THREE: Performed by: STUDENT IN AN ORGANIZED HEALTH CARE EDUCATION/TRAINING PROGRAM

## 2024-04-25 PROCEDURE — 7100000009 HC PHASE TWO TIME - INITIAL BASE CHARGE: Performed by: STUDENT IN AN ORGANIZED HEALTH CARE EDUCATION/TRAINING PROGRAM

## 2024-04-25 PROCEDURE — A64721 PR REVISE MEDIAN N/CARPAL TUNNEL SURG: Performed by: ANESTHESIOLOGY

## 2024-04-25 PROCEDURE — 64721 CARPAL TUNNEL SURGERY: CPT | Performed by: STUDENT IN AN ORGANIZED HEALTH CARE EDUCATION/TRAINING PROGRAM

## 2024-04-25 PROCEDURE — RXMED WILLOW AMBULATORY MEDICATION CHARGE

## 2024-04-25 PROCEDURE — 3700000001 HC GENERAL ANESTHESIA TIME - INITIAL BASE CHARGE: Performed by: STUDENT IN AN ORGANIZED HEALTH CARE EDUCATION/TRAINING PROGRAM

## 2024-04-25 PROCEDURE — A64721 PR REVISE MEDIAN N/CARPAL TUNNEL SURG: Performed by: NURSE ANESTHETIST, CERTIFIED REGISTERED

## 2024-04-25 PROCEDURE — 7100000010 HC PHASE TWO TIME - EACH INCREMENTAL 1 MINUTE: Performed by: STUDENT IN AN ORGANIZED HEALTH CARE EDUCATION/TRAINING PROGRAM

## 2024-04-25 PROCEDURE — 3600000008 HC OR TIME - EACH INCREMENTAL 1 MINUTE - PROCEDURE LEVEL THREE: Performed by: STUDENT IN AN ORGANIZED HEALTH CARE EDUCATION/TRAINING PROGRAM

## 2024-04-25 PROCEDURE — 2500000005 HC RX 250 GENERAL PHARMACY W/O HCPCS: Performed by: STUDENT IN AN ORGANIZED HEALTH CARE EDUCATION/TRAINING PROGRAM

## 2024-04-25 PROCEDURE — 2720000007 HC OR 272 NO HCPCS: Performed by: STUDENT IN AN ORGANIZED HEALTH CARE EDUCATION/TRAINING PROGRAM

## 2024-04-25 RX ORDER — LIDOCAINE HYDROCHLORIDE 10 MG/ML
0.1 INJECTION INFILTRATION; PERINEURAL ONCE
Status: DISCONTINUED | OUTPATIENT
Start: 2024-04-25 | End: 2024-04-25 | Stop reason: HOSPADM

## 2024-04-25 RX ORDER — ONDANSETRON 4 MG/1
4 TABLET, FILM COATED ORAL EVERY 8 HOURS PRN
Qty: 10 TABLET | Refills: 0 | Status: SHIPPED | OUTPATIENT
Start: 2024-04-25

## 2024-04-25 RX ORDER — PROPOFOL 10 MG/ML
INJECTION, EMULSION INTRAVENOUS AS NEEDED
Status: DISCONTINUED | OUTPATIENT
Start: 2024-04-25 | End: 2024-04-25

## 2024-04-25 RX ORDER — ONDANSETRON HYDROCHLORIDE 2 MG/ML
4 INJECTION, SOLUTION INTRAVENOUS ONCE AS NEEDED
Status: DISCONTINUED | OUTPATIENT
Start: 2024-04-25 | End: 2024-04-25 | Stop reason: HOSPADM

## 2024-04-25 RX ORDER — HYDRALAZINE HYDROCHLORIDE 20 MG/ML
5 INJECTION INTRAMUSCULAR; INTRAVENOUS EVERY 30 MIN PRN
Status: DISCONTINUED | OUTPATIENT
Start: 2024-04-25 | End: 2024-04-25 | Stop reason: HOSPADM

## 2024-04-25 RX ORDER — HYDROCODONE BITARTRATE AND ACETAMINOPHEN 5; 325 MG/1; MG/1
1 TABLET ORAL EVERY 6 HOURS PRN
Qty: 12 TABLET | Refills: 0 | Status: SHIPPED | OUTPATIENT
Start: 2024-04-25

## 2024-04-25 RX ORDER — CLINDAMYCIN PHOSPHATE 900 MG/50ML
900 INJECTION, SOLUTION INTRAVENOUS ONCE
Status: COMPLETED | OUTPATIENT
Start: 2024-04-25 | End: 2024-04-25

## 2024-04-25 RX ORDER — MIDAZOLAM HYDROCHLORIDE 1 MG/ML
INJECTION, SOLUTION INTRAMUSCULAR; INTRAVENOUS AS NEEDED
Status: DISCONTINUED | OUTPATIENT
Start: 2024-04-25 | End: 2024-04-25

## 2024-04-25 RX ORDER — SODIUM CHLORIDE, SODIUM LACTATE, POTASSIUM CHLORIDE, CALCIUM CHLORIDE 600; 310; 30; 20 MG/100ML; MG/100ML; MG/100ML; MG/100ML
100 INJECTION, SOLUTION INTRAVENOUS CONTINUOUS
Status: DISCONTINUED | OUTPATIENT
Start: 2024-04-25 | End: 2024-04-25 | Stop reason: HOSPADM

## 2024-04-25 RX ORDER — FENTANYL CITRATE 50 UG/ML
INJECTION, SOLUTION INTRAMUSCULAR; INTRAVENOUS AS NEEDED
Status: DISCONTINUED | OUTPATIENT
Start: 2024-04-25 | End: 2024-04-25

## 2024-04-25 RX ORDER — BUPIVACAINE HYDROCHLORIDE AND EPINEPHRINE 5; 5 MG/ML; UG/ML
INJECTION, SOLUTION EPIDURAL; INTRACAUDAL; PERINEURAL AS NEEDED
Status: DISCONTINUED | OUTPATIENT
Start: 2024-04-25 | End: 2024-04-25 | Stop reason: HOSPADM

## 2024-04-25 RX ORDER — ALBUTEROL SULFATE 0.83 MG/ML
2.5 SOLUTION RESPIRATORY (INHALATION) ONCE AS NEEDED
Status: DISCONTINUED | OUTPATIENT
Start: 2024-04-25 | End: 2024-04-25 | Stop reason: HOSPADM

## 2024-04-25 RX ORDER — LIDOCAINE HYDROCHLORIDE 10 MG/ML
INJECTION INFILTRATION; PERINEURAL AS NEEDED
Status: DISCONTINUED | OUTPATIENT
Start: 2024-04-25 | End: 2024-04-25 | Stop reason: HOSPADM

## 2024-04-25 RX ORDER — ONDANSETRON HYDROCHLORIDE 2 MG/ML
INJECTION, SOLUTION INTRAVENOUS AS NEEDED
Status: DISCONTINUED | OUTPATIENT
Start: 2024-04-25 | End: 2024-04-25

## 2024-04-25 RX ADMIN — CLINDAMYCIN IN 5 PERCENT DEXTROSE 900 MG: 18 INJECTION, SOLUTION INTRAVENOUS at 08:03

## 2024-04-25 RX ADMIN — MIDAZOLAM 2 MG: 1 INJECTION INTRAMUSCULAR; INTRAVENOUS at 08:08

## 2024-04-25 RX ADMIN — SODIUM CHLORIDE, SODIUM LACTATE, POTASSIUM CHLORIDE, AND CALCIUM CHLORIDE 100 ML/HR: 600; 310; 30; 20 INJECTION, SOLUTION INTRAVENOUS at 07:06

## 2024-04-25 RX ADMIN — FENTANYL CITRATE 50 MCG: 50 INJECTION INTRAMUSCULAR; INTRAVENOUS at 08:52

## 2024-04-25 RX ADMIN — FENTANYL CITRATE 50 MCG: 50 INJECTION INTRAMUSCULAR; INTRAVENOUS at 08:09

## 2024-04-25 RX ADMIN — PROPOFOL 200 MG: 10 INJECTION, EMULSION INTRAVENOUS at 08:12

## 2024-04-25 RX ADMIN — ONDANSETRON 4 MG: 2 INJECTION, SOLUTION INTRAMUSCULAR; INTRAVENOUS at 08:53

## 2024-04-25 SDOH — HEALTH STABILITY: MENTAL HEALTH: CURRENT SMOKER: 0

## 2024-04-25 ASSESSMENT — PAIN - FUNCTIONAL ASSESSMENT
PAIN_FUNCTIONAL_ASSESSMENT: 0-10

## 2024-04-25 ASSESSMENT — PAIN SCALES - GENERAL
PAINLEVEL_OUTOF10: 0 - NO PAIN

## 2024-04-25 NOTE — BRIEF OP NOTE
Date: 2024  OR Location: [facility]    Name: Sandy Smith, : 1955, Age: 68 y.o., MRN: [id number], Sex: female    Diagnosis  Pre-op Diagnosis     * Carpal tunnel syndrome, right upper limb [G56.01] Post-op Diagnosis     * Carpal tunnel syndrome, right upper limb [G56.01]     Procedures  LEFT ENDOSCOPIC CARPAL TUNNEL RELEASE  [id number] - ME NDSC WRST SURG W/RLS TRANSVRS CARPL LIGM      Surgeons      * Aurelio Greyr - Primary    Resident/Fellow/Other Assistant:  Surgeons and Role:  * No surgeons found with a matching role *    Procedure Summary  Anesthesia: General  ASA: III  Anesthesia Staff: Anesthesiologist: Austin Dawkins MD  CRNA: DERRICK Alfaro-CRNA  Estimated Blood Loss: 5mL  Intra-op Medications:   Administrations occurring from 0800 to 0910 on 24:   Medication Name Total Dose   BUPivacaine-EPINEPHrine (PF) (Marcaine w/EPI) 0.5 %-1:200,000 injection 10 mL   lidocaine (Xylocaine) 10 mg/mL (1 %) injection 10 mL   clindamycin in D5W (Cleocin) IVPB 900 mg 900 mg              Anesthesia Record               Intraprocedure I/O Totals       None           Specimen: No specimens collected     Staff:   Circulator: Lindsey Gaines RN  Scrub Person: Nuris Titus PA-C; Deion Cotton          Findings: left carpal tunnel     Complications:  None; patient tolerated the procedure well.     Disposition: PACU - hemodynamically stable.  Condition: stable  Specimens Collected: No specimens collected  Attending Attestation: I was present and scrubbed for the entire procedure.    Aurelio Hastings  Phone Number: [phone]

## 2024-04-25 NOTE — PERIOPERATIVE NURSING NOTE
Pt received from OR via cart, monitors on, report relieved. Pt arousable, VSS. Assessment completed as documented, orders reviewed/safety maintained.

## 2024-04-25 NOTE — H&P
No interval change since last office visit. Okay to proceed with surgery.    History of Present Illness:  The patient presents today for evaluation of side: left hand pain.  The patient endorses numbness and tingling (predominantly radial three digits).  There is no current neck pain or cervical spine issues.  The patient has tried to the following interventions thus far:  bracing .  She recently was evaluated in the emergency room for carpal tunnel syndrome. The patient notes the pain is worse with activity such as driving or talking on the phone and at night.  The patient denies any recent trauma.  The pain is sharp, electrical in nature.     She wakes up at night with numbness and tingling in her left hand     3/18/2024 Follow Up:  Patient presents for follow-up today.  She continues to have numbness and tingling in the radial 3 digits of her left hand.  She notes that her previous carpal tunnel injection gave her significant relief however has worn off.  She continues to wake up with numbness and tingling.  She wears her brace intermittently at night without significant relief.     Medical History        Past Medical History:   Diagnosis Date    Encounter for general adult medical examination without abnormal findings 01/31/2022     Encounter for initial preventive physical examination covered by Medicare    Otitis media, unspecified, right ear 01/31/2014     Acute right otitis media    Personal history of other diseases of the digestive system 07/21/2013     History of esophageal reflux    Personal history of other diseases of the musculoskeletal system and connective tissue 07/21/2013     History of low back pain    Personal history of other specified conditions 07/21/2013     History of angioedema            Medication Documentation Review Audit         Reviewed by Jyoti Matos MA (Medical Assistant) on 03/18/24 at 1021       Medication Order Taking? Sig Documenting Provider Last Dose Status    acetaminophen (TylenoL) 325 mg tablet [id number] No Take 2 tablets (650 mg) by mouth every 6 hours if needed for mild pain (1 - 3) or fever (temp greater than 38.0 C). DERRICK Marquez-CNP Taking Active   alendronate (Fosamax) 70 mg tablet [id number] No TAKE 1 TABLET BY MOUTH EVERY 7 DAYS. TAKE IN THE MORNING WITH A FULL GLASS OF WATER, ON AN EMPTY STOMACH, AND DO NOT TAKE ANYTHING ELSE BY MOUTH OR LIE FOR THE NEXT 30 MINUTES.   Patient not taking: Reported on 3/6/2024    Tanmay Whitten MD Not Taking Active   alum-mag hydroxide-simeth (Mylanta) 200-200-20 mg/5 mL oral suspension [id number] No Take 20 mL by mouth 4 times a day as needed for indigestion or heartburn for up to 14 days. Joel Cabrera MD Taking  24 2359   Discontinued 23 0832   calcium carbonate (Tums) 200 mg calcium chewable tablet [id number]   Chew 1 tablet (500 mg) 2 times a day. Olga Newsome MD   Active   cetirizine (ZyrTEC) 10 mg tablet [id number] No Take 1 tablet (10 mg) by mouth once daily as needed. Historical Provider, MD Taking Active   cholecalciferol, vitamin D3, 10 mcg (400 unit) capsule [id number] No TAKE 1 CAPSULE Daily Historical Provider, MD Taking Active   ciclopirox (Penlac) 8 % solution [id number] No APPLY TO AFFECTED TOENAILS ONCE DAILY.   Patient taking differently: if needed.    Ana Patiño DPM Taking Active   diclofenac sodium (Voltaren) 1 % gel gel [id number] No Apply sparingly to affected areas three times a day as needed for pain Historical Provider, MD Not Taking Active   empagliflozin (Jardiance) 10 mg [id number] No TAKE 1 TABLET BY MOUTH ONCE DAILY Olga Newsome MD Taking Active   estradiol (Estrace) 0.01 % (0.1 mg/gram) vaginal cream [id number]   Insert 0.5 Applicatorfuls (2 g) into the vagina once daily. Apply to vagina nightly for 1 week then every Monday/Wednesday/Friday. Olga Newsome MD   Active   famotidine (Pepcid) 20 mg tablet [id number] No Take 1 tablet (20  mg) by mouth once daily.   Patient not taking: Reported on 3/6/2024    Iris Beltran, APRN-CNP Not Taking Active   fluticasone (Flonase) 50 mcg/actuation nasal spray [id number] No USE 1 TO 2 SPRAYS IN EACH NOSTRIL ONCE DAILY. Red Angel MD Not Taking Active   hydrALAZINE (Apresoline) 10 mg tablet [id number] No TAKE 1 TABLET BY MOUTH EVERY 8 HOURS Tanmay Whitten MD Taking Active   isosorbide dinitrate (Isordil) 20 mg tablet [id number] No TAKE 1 TABLET BY MOUTH THREE TIMES A DAY Olga Newsome MD Taking Active   lidocaine (Xylocaine) 5 % cream cream [id number] No Apply to area of pain once daily as needed. Red Angel MD Not Taking Active   methocarbamol (Robaxin) 500 mg tablet [id number] No Take 1-2 tablets as needed at bedtime for muscle tightness, spasms Red Angel MD Taking Active   metoprolol succinate XL (Toprol-XL) 25 mg 24 hr tablet [id number] No Take 1 tablet (25 mg) by mouth once daily. Historical MD Stanley Taking Active   NaCl-NaHCO3-hyaluron sod-aloe (NasogeL) 0.9 % spray gel [id number] No USE AS DIRECTED. Red Angel MD Taking Active   psyllium husk, aspartame, (Metamucil Sugar-Free, aspart,) 3.4 gram/5.8 gram powder [id number] No Use as directed 1-3 times daily as needed to maintain regular bowel movements. Red Angel MD Not Taking Active   sodium chloride-aloe vera spray,non-aerosol [id number] No 1 spray each nostril at bedtime Historical MD Stanley Taking Active   Discontinued 03/14/24 0441   spironolactone (Aldactone) 25 mg tablet [id number]   TAKE 1 TABLET (25 MG) BY MOUTH ONCE DAILY. Olga Newsome MD   Active                               Allergies   Allergen Reactions    Cephalexin Unknown    Lisinopril Angioedema         Social History               Socioeconomic History    Marital status: Single       Spouse name: Not on file    Number of children: Not on file    Years of education: Not on file    Highest education level: Not on file    Occupational History    Not on file   Tobacco Use    Smoking status: Former       Types: Cigarettes       Passive exposure: Past    Smokeless tobacco: Never   Vaping Use    Vaping Use: Never used   Substance and Sexual Activity    Alcohol use: Yes       Comment: occasional    Drug use: Never    Sexual activity: Not on file   Other Topics Concern    Not on file   Social History Narrative    Not on file      Social Determinants of Health           Financial Resource Strain: Low Risk  (3/3/2024)     Overall Financial Resource Strain (CARDIA)      Difficulty of Paying Living Expenses: Not hard at all   Food Insecurity: Not on file   Transportation Needs: No Transportation Needs (3/3/2024)     PRAPARE - Transportation      Lack of Transportation (Medical): No      Lack of Transportation (Non-Medical): No   Physical Activity: Not on file   Stress: Not on file   Social Connections: Not on file   Intimate Partner Violence: Not on file   Housing Stability: Low Risk  (3/3/2024)     Housing Stability Vital Sign      Unable to Pay for Housing in the Last Year: No      Number of Places Lived in the Last Year: 1      Unstable Housing in the Last Year: No            Surgical History         Past Surgical History:   Procedure Laterality Date    COLONOSCOPY   08/23/2013     Colonoscopy (Fiberoptic)    CT ANGIO CORONARY ART WITH HEARTFLOW IF SCORE >30%   11/20/2018     CT HEART CORONARY ANGIOGRAM 11/20/2018 [facility] [place] ANCILLARY LEGACY    CT ANGIO NECK   1/29/2022     CT NECK ANGIO W AND WO IV CONTRAST 1/29/2022 [facility]ICAL LEGAC[facility]    OTHER SURGICAL HISTORY   02/08/2021     Pacemaker insertion    OTHER SURGICAL HISTORY   11/08/2018     Umbilical hernia repair    OTHER SURGICAL HISTORY   12/05/2013     Wrist Surgery               No History of diabetes or hypothyroidism.     Review of Systems   GENERAL: Negative for malaise, significant weight loss, fever  MUSCULOSKELETAL: see HPI  NEURO:  Negative     Physical Examination:  No tenderness  to palpation cervical spine  Good ROM of neck  Negative Spurlings test     side: left wrist/hand:  No obvious swelling or masses  Thenar eminence muscle mass: thenar: mild atrophy note, fasciulations in thenar musculature  No atrophy noted of hypothenar eminence   Positive Tinel's at carpal tunnel  + Median nerve compression test  + Phalens  Negative tinels at guyons and cubital tunnel  Negative elbow flexion test  Decreased sensation to light touch  in median nerve distribution  Motor exam within normal limits  Radial pulse palpable  Good capillary refill        Additional studies: XR of left wrist was reviewed in office today: 1/24/2024  Mild radiocarpal, STT, CMC of the thumb arthritis.  No fracture dislocations noted     Assessment:  Patient with side: left carpal tunnel syndrome     Plan:  We reviewed the pathogenesis and treatment of carpal tunnel syndrome today in detail. We reviewed the role of night splints in the treatment of carpal tunnel syndrome. We did review the role of a carpal tunnel corticosteroid injection, and should symptoms worsen this may be considered in the future for both diagnostic and therapeutic purposes. We discussed that carpal tunnel corticosteroid injections are generally not curative, but can provide symptomatic relief for 6 weeks - 6 months. We also reviewed the role of electrodiagnostic studies including EMG/NCS of the extremity to further evaluate possible anatomic sites of compression of the nerve peripherally or more proximally in the cervical spine, as well as providing objective measurements of nerve conduction velocity, distal motor and sensory latency, and motor and sensory amplitude. We discussed that electrodiagnostic studies are not necessary to make the diagnosis of carpal tunnel syndrome, but can provide confirmatory information as well as baseline values for the above noted measures. We discussed that the false negative rate of electrodiagnostic studies in the setting  of CTS is approximately 10%. The patient is understanding of these recommendations. We discussed that should their symptoms worsen we may proceed with the above course of treatment. Ultimately if their symptoms were to worsen or not be sufficiently improved with any conservative treatment modalities, or if concern existed for severe compression of the nerve and potential irreversible damage to the sensory organs or thenar motor function, we may consider an open or endoscopic carpal tunnel release.     CTS-6 Score     Numbness in predominantly or exclusively in median nerve distribution (3.5pts): 3.5     Nocturnal symptoms (4 pts): 4     Thenar atrophy or weakness (5 pts): 5     Positive Phalen test (5 pts): 5     2pd > 5mm (4.5 pts): 0     Positive Tinel sign (4 pts): 4     Total score: 21.5/26 (>/= 12 pts equates to approximately 80% probability of CTS diagnosis)     Pt elected for Left carpal tunnel release.     For Surgical planning:  For Surgical Planning:  Diagnosis: Left carpal tunnel syndrome  Procedure: Left Endoscopic carpal tunnel release  CPT: 48186  Anesthesia: General  Duration: 40 minutes  Special Equipment Needed: Arthrex endoscopic carpal tunnel set  Pre-operative testing needed  Location: [facility]  Initial Post Operative Visit: 2 weeks

## 2024-04-25 NOTE — ANESTHESIA PREPROCEDURE EVALUATION
Patient: Sandy Smith    Procedure Information       Date/Time: 04/25/24 0800    Procedure: LEFT ENDOSCOPIC CARPAL TUNNEL RELEASE/ 40 MINS ( ARTHREX ENDOSCOPIC CARPAL TUNNEL SET ) (Left: Hand)    Location: [facility]    Surgeons: Aurelio DAWSON Beucler, DO          Past Medical History:   Diagnosis Date    Cardiomyopathy, unspecified (Multi)     CHF (congestive heart failure) (Multi)     CKD (chronic kidney disease)     Encounter for general adult medical examination without abnormal findings 01/31/2022    Encounter for initial preventive physical examination covered by Medicare    Hypertension     Otitis media, unspecified, right ear 01/31/2014    Acute right otitis media    Personal history of other diseases of the digestive system 07/21/2013    History of esophageal reflux    Personal history of other diseases of the musculoskeletal system and connective tissue 07/21/2013    History of low back pain    Personal history of other specified conditions 07/21/2013    History of angioedema        Relevant Problems   Cardiac   (+) CHF (congestive heart failure) (Multi)   (+) Chest pain   (+) Hyperlipidemia   (+) Hypertension   (+) ICD (implantable cardioverter-defibrillator) in place   (+) Rib pain      Neuro   (+) 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   (+) Bilateral carpal tunnel syndrome   (+) Carpal tunnel syndrome, right upper limb      GI   (+) GERD (gastroesophageal reflux disease)      Liver   (+) Chronic hepatitis C without hepatic coma (Multi)      Musculoskeletal   (+) Bilateral carpal tunnel syndrome   (+) Carpal tunnel syndrome, right upper limb      HEENT   (+) Chronic sinusitis   (+) Mixed conductive and sensorineural hearing loss of both ears   (+) Sinus congestion      ID   (+) Chronic hepatitis C without hepatic coma (Multi)   (+) Onychomycosis of toenail   (+) Toe infection     Past Surgical History:   Procedure Laterality Date    BONE GRAFT Left     CARDIAC DEFIBRILLATOR PLACEMENT      COLONOSCOPY  08/23/2013     Colonoscopy (Fiberoptic)    CT ANGIO CORONARY ART WITH HEARTFLOW IF SCORE >30%  11/20/2018    CT HEART CORONARY ANGIOGRAM 11/20/2018 [facility] ANCILLARY LEGACY    CT ANGIO NECK  01/29/2022    CT NECK ANGIO W AND WO IV CONTRAST 1/29/2022 [facility] CLINICAL LEGACY    KNEE SURGERY Left     OTHER SURGICAL HISTORY  02/08/2021    Pacemaker insertion    OTHER SURGICAL HISTORY  11/08/2018    Umbilical hernia repair    OTHER SURGICAL HISTORY  12/05/2013    Wrist Surgery      Clinical information reviewed:   Tobacco  Allergies  Meds   Med Hx  Surg Hx   Fam Hx  Soc Hx        NPO Detail:  No data recorded     Physical Exam    Airway  Mallampati: II  TM distance: >3 FB  Neck ROM: full     Cardiovascular    Dental    Pulmonary    Abdominal            Anesthesia Plan    History of general anesthesia?: yes  History of complications of general anesthesia?: no    ASA 3     general     The patient is not a current smoker.  Patient was not previously instructed to abstain from smoking on day of procedure.  Patient did not smoke on day of procedure.    intravenous induction   Anesthetic plan and risks discussed with patient.    Plan discussed with attending.

## 2024-05-01 ENCOUNTER — OFFICE VISIT (OUTPATIENT)
Dept: PRIMARY CARE | Facility: CLINIC | Age: 69
End: 2024-05-01
Payer: COMMERCIAL

## 2024-05-01 VITALS
HEART RATE: 85 BPM | WEIGHT: 122 LBS | HEIGHT: 59 IN | SYSTOLIC BLOOD PRESSURE: 107 MMHG | TEMPERATURE: 97 F | OXYGEN SATURATION: 97 % | BODY MASS INDEX: 24.6 KG/M2 | DIASTOLIC BLOOD PRESSURE: 67 MMHG

## 2024-05-01 DIAGNOSIS — Z12.4 CERVICAL CANCER SCREENING: Primary | ICD-10-CM

## 2024-05-01 PROCEDURE — 1159F MED LIST DOCD IN RCRD: CPT

## 2024-05-01 PROCEDURE — 88175 CYTOPATH C/V AUTO FLUID REDO: CPT

## 2024-05-01 PROCEDURE — 3074F SYST BP LT 130 MM HG: CPT

## 2024-05-01 PROCEDURE — 99213 OFFICE O/P EST LOW 20 MIN: CPT

## 2024-05-01 PROCEDURE — 3078F DIAST BP <80 MM HG: CPT

## 2024-05-01 PROCEDURE — 1160F RVW MEDS BY RX/DR IN RCRD: CPT

## 2024-05-01 ASSESSMENT — PATIENT HEALTH QUESTIONNAIRE - PHQ9
1. LITTLE INTEREST OR PLEASURE IN DOING THINGS: NOT AT ALL
2. FEELING DOWN, DEPRESSED OR HOPELESS: NOT AT ALL
SUM OF ALL RESPONSES TO PHQ9 QUESTIONS 1 AND 2: 0

## 2024-05-01 ASSESSMENT — PAIN SCALES - GENERAL: PAINLEVEL_OUTOF10: 0 - NO PAIN

## 2024-05-01 ASSESSMENT — ACTIVITIES OF DAILY LIVING (ADL)
DOING_HOUSEWORK: INDEPENDENT
TAKING_MEDICATION: INDEPENDENT
MANAGING_FINANCES: INDEPENDENT
GROCERY_SHOPPING: INDEPENDENT

## 2024-05-01 ASSESSMENT — ENCOUNTER SYMPTOMS
OCCASIONAL FEELINGS OF UNSTEADINESS: 0
LOSS OF SENSATION IN FEET: 0
DEPRESSION: 0

## 2024-05-01 NOTE — PROGRESS NOTES
Agree with Resident and/or Medical student plan for routine pap test today.    Patient discussed with attending, Dr. Tomi Drake MD  Family Medicine, PGY-3

## 2024-05-01 NOTE — PROGRESS NOTES
Patient ID: Sandy Smith is a 68 y.o. female with PMH of HTN, pacemaker/defibrillator, and CHF  who presents for PAP smear     Subjective     HPI  Patient presents for pap smear, she was seen [facility] 3/2024 for medicare annual wellness check. She was unable to stay and have her pap completed that day and rescheduled for today. She denies any vaginal bleeding or pain, discharge     Review of Systems   Constitutional:  Negative for activity change, appetite change, chills, fever and unexpected weight change.   Respiratory:  Negative for apnea, chest tightness and shortness of breath.    Genitourinary:  Negative for dyspareunia, dysuria, flank pain, urgency, vaginal bleeding, vaginal discharge and vaginal pain.     Past Medical History:   Diagnosis Date    Cardiomyopathy, unspecified (Multi)     CHF (congestive heart failure) (Multi)     CKD (chronic kidney disease)     Encounter for general adult medical examination without abnormal findings 01/31/2022    Encounter for initial preventive physical examination covered by Medicare    Hypertension     Otitis media, unspecified, right ear 01/31/2014    Acute right otitis media    Personal history of other diseases of the digestive system 07/21/2013    History of esophageal reflux    Personal history of other diseases of the musculoskeletal system and connective tissue 07/21/2013    History of low back pain    Personal history of other specified conditions 07/21/2013    History of angioedema     Past Surgical History:   Procedure Laterality Date    BONE GRAFT Left     CARDIAC DEFIBRILLATOR PLACEMENT      COLONOSCOPY  08/23/2013    Colonoscopy (Fiberoptic)    CT ANGIO CORONARY ART WITH HEARTFLOW IF SCORE >30%  11/20/2018    CT HEART CORONARY ANGIOGRAM 11/20/2018 [facility] ANCILLARY LEGACY    CT ANGIO NECK  01/29/2022    CT NECK ANGIO W AND WO IV CONTRAST 1/29/2022 [facility] CLINICAL LEGACY    KNEE SURGERY Left     OTHER SURGICAL HISTORY  02/08/2021    Pacemaker insertion    OTHER  SURGICAL HISTORY  2018    Umbilical hernia repair    OTHER SURGICAL HISTORY  2013    Wrist Surgery     Family History   Problem Relation Name Age of Onset    Aneurysm Mother      Heart disease Mother      Hyperlipidemia Mother      Hypertension Mother      Heart disease Father      Hypertension Father      Other (cardiac disorder) Sister      Coronary artery disease Sister      Other (cardiac disorder) Brother      Coronary artery disease Brother       Cephalexin and Lisinopril   Social History     Tobacco Use    Smoking status: Former     Current packs/day: 0.00     Types: Cigarettes     Quit date: 1970     Years since quittin.3     Passive exposure: Past    Smokeless tobacco: Never   Substance Use Topics    Alcohol use: Yes     Comment: occasional       Current Outpatient Medications on File Prior to Visit   Medication Sig Dispense Refill    acetaminophen (TylenoL) 325 mg tablet Take 2 tablets (650 mg) by mouth every 6 hours if needed for mild pain (1 - 3) or fever (temp greater than 38.0 C). (Patient not taking: Reported on 2024) 30 tablet 0    calcium carbonate (Tums) 200 mg calcium chewable tablet Chew 1 tablet (500 mg) 2 times a day. 60 tablet 2    cetirizine (ZyrTEC) 10 mg tablet Take 1 tablet (10 mg) by mouth once daily as needed.      cholecalciferol, vitamin D3, 10 mcg (400 unit) capsule TAKE 1 CAPSULE Daily      ciclopirox (Penlac) 8 % solution Apply topically once daily at bedtime. (Patient not taking: Reported on 2024) 6.6 mL 0    empagliflozin (Jardiance) 10 mg TAKE 1 TABLET BY MOUTH ONCE DAILY 90 tablet 2    estradiol (Estrace) 0.01 % (0.1 mg/gram) vaginal cream Insert 0.5 Applicatorfuls (2 g) into the vagina once daily. Apply to vagina nightly for 1 week then every Monday/Wednesday/Friday. 42.5 g 11    hydrALAZINE (Apresoline) 10 mg tablet TAKE 1 TABLET BY MOUTH EVERY 8 HOURS 540 tablet 1    HYDROcodone-acetaminophen (Norco) 5-325 mg tablet Take 1 tablet by mouth every 6  "hours if needed for severe pain (7 - 10). 12 tablet 0    isosorbide dinitrate (Isordil) 20 mg tablet TAKE 1 TABLET BY MOUTH THREE TIMES A  tablet 2    lidocaine (Xylocaine) 5 % cream cream Apply to area of pain once daily as needed.      methocarbamol (Robaxin) 500 mg tablet Take 1-2 tablets as needed at bedtime for muscle tightness, spasms      metoprolol succinate XL (Toprol-XL) 25 mg 24 hr tablet Take 1 tablet (25 mg) by mouth once daily. 30 tablet 11    NaCl-NaHCO3-hyaluron sod-aloe (NasogeL) 0.9 % spray gel USE AS DIRECTED.      ondansetron (Zofran) 4 mg tablet Take 1 tablet (4 mg) by mouth every 8 hours if needed for nausea or vomiting. 10 tablet 0    psyllium husk, aspartame, (Metamucil Sugar-Free, aspart,) 3.4 gram/5.8 gram powder Use as directed 1-3 times daily as needed to maintain regular bowel movements.      sodium chloride-aloe vera spray,non-aerosol 1 spray each nostril at bedtime      spironolactone (Aldactone) 25 mg tablet TAKE 1 TABLET (25 MG) BY MOUTH ONCE DAILY. 90 tablet 1    [DISCONTINUED] amiodarone (Pacerone) 200 mg tablet Take 1 tablet (200 mg) by mouth once daily. 30 tablet 2     No current facility-administered medications on file prior to visit.        Objective   Vitals: /67 (BP Location: Right arm, Patient Position: Sitting, BP Cuff Size: Adult)   Pulse 85   Temp 36.1 °C (97 °F)   Ht 1.499 m (4' 11\")   Wt 55.3 kg (122 lb)   SpO2 97%   BMI 24.64 kg/m²      Physical Exam  Genitourinary:      Vulva, bladder, rectum and urethral meatus normal.      Right Labia: No rash, tenderness, lesions or skin changes.     Left Labia: No tenderness, lesions, skin changes or rash.     No vaginal discharge.        Right Adnexa: not tender and no mass present.     Left Adnexa: not tender and no mass present.     No cervical discharge, friability or lesion.         Assessment/Plan   68 yr old female with pmh of HTN, pacemaker/defibrillator, and CHF  who presents for PAP smear    Problem " List Items Addressed This Visit    None  Visit Diagnoses       Cervical cancer screening    -  Primary    Relevant Orders    THINPREP PAP TEST          Plan:    #Pap smear  - Patient presents for pap given last PAP 2019 (patient 63 years old) and did not receive last pap at 65. Previous Paps have been nml   - Per screening guidelines, last screening to be done at current age. She will no longer require additional paps following neg results  - Pap was collected and sent, will fu results     Patient d/w Dr. Krishna     RTC in 6 months or sooner if needed     Olga Newsome MD   PGY1, Fam Med

## 2024-05-06 ENCOUNTER — OFFICE VISIT (OUTPATIENT)
Dept: ORTHOPEDIC SURGERY | Facility: HOSPITAL | Age: 69
End: 2024-05-06
Payer: COMMERCIAL

## 2024-05-06 DIAGNOSIS — G56.02 CARPAL TUNNEL SYNDROME OF LEFT WRIST: Primary | ICD-10-CM

## 2024-05-06 PROCEDURE — 99024 POSTOP FOLLOW-UP VISIT: CPT | Performed by: STUDENT IN AN ORGANIZED HEALTH CARE EDUCATION/TRAINING PROGRAM

## 2024-05-06 PROCEDURE — 1159F MED LIST DOCD IN RCRD: CPT | Performed by: STUDENT IN AN ORGANIZED HEALTH CARE EDUCATION/TRAINING PROGRAM

## 2024-05-06 PROCEDURE — 1160F RVW MEDS BY RX/DR IN RCRD: CPT | Performed by: STUDENT IN AN ORGANIZED HEALTH CARE EDUCATION/TRAINING PROGRAM

## 2024-05-06 ASSESSMENT — PAIN - FUNCTIONAL ASSESSMENT: PAIN_FUNCTIONAL_ASSESSMENT: 0-10

## 2024-05-06 ASSESSMENT — PAIN SCALES - GENERAL: PAINLEVEL_OUTOF10: 0 - NO PAIN

## 2024-05-06 NOTE — PROGRESS NOTES
[facility]  Hand and Upper Extremity Service  Post Operative visit        Date of surgery: 4/25/2024  Surgery(s) performed: Left carpal tunnel release        Subjective report:   Patient is approximately 2 weeks postop left carpal tunnel release.  She is doing well.  She notes the numbness and tingling has improved she notes her pain in her hand has improved.  She is no longer waking up having to shake out her hand.  She is pleased with her results.  She would like to return to work.  She denies fevers and chills        Exam findings; left upper extremity  There are well-healed incisions with sutures intact.  There is no erythema, warmth, drainage.  There is no significant swelling present.  Sutures were removed.  Sensation is intact to light touch to radial, ulnar, median nerves.  AIN, PIN, ulnar motor nerves intact.  Brisk capillary refill.  She has good opposition of her thumb with good APB muscle strength.        Radiograph findings: None        Plan:   Sutures removed in office.  Steri-Strips were placed.  We discussed our management.  She start this in 1 week.  She may use vitamin E/cocoa butter/lotion of her choosing to massage her scar tissue.  She was given a cock up wrist brace to wear for work.  She is planning to return to work on Thursday.  She cleans homes for living.  This is to be used for symptomatic purposes..  Follow-up as needed     Medications Prescribed: None           Will Beucler, DO  [facility] [employer]  Department of Orthopaedic Surgery  Hand and Upper Extremity Surgery  [facility]     Dictation performed with the use of voice recognition software. Syntax and grammatical errors may exist.

## 2024-05-07 PROCEDURE — RXMED WILLOW AMBULATORY MEDICATION CHARGE

## 2024-05-09 LAB
CYTOLOGY CMNT CVX/VAG CYTO-IMP: NORMAL
LAB AP HPV GENOTYPE QUESTION: YES
LAB AP HPV HR: NORMAL
LABORATORY COMMENT REPORT: NORMAL
PATH REPORT.TOTAL CANCER: NORMAL

## 2024-05-10 PROCEDURE — RXMED WILLOW AMBULATORY MEDICATION CHARGE

## 2024-05-10 ASSESSMENT — ENCOUNTER SYMPTOMS
SHORTNESS OF BREATH: 0
CHEST TIGHTNESS: 0
DYSURIA: 0
FLANK PAIN: 0
APNEA: 0
UNEXPECTED WEIGHT CHANGE: 0
FEVER: 0
CHILLS: 0
APPETITE CHANGE: 0
ACTIVITY CHANGE: 0

## 2024-05-11 NOTE — PROGRESS NOTES
I was the supervising physician in the delivery of the service.  I reviewed the resident/fellow's documentation and discussed the patient with the resident/fellow. I agree with the resident/fellow's medical decision making as documented in their note with the exception/addition of the following:    Went Pap test results return, patient will need plan for follow-up    So Krishna MD

## 2024-05-16 DIAGNOSIS — L60.8 TOENAIL DEFORMITY: ICD-10-CM

## 2024-05-17 ENCOUNTER — PHARMACY VISIT (OUTPATIENT)
Dept: PHARMACY | Facility: CLINIC | Age: 69
End: 2024-05-17
Payer: COMMERCIAL

## 2024-05-19 RX ORDER — CICLOPIROX 80 MG/ML
SOLUTION TOPICAL NIGHTLY
Qty: 6.6 ML | Refills: 0 | Status: SHIPPED | OUTPATIENT
Start: 2024-05-19 | End: 2024-06-21

## 2024-05-20 ENCOUNTER — TELEPHONE (OUTPATIENT)
Dept: PEDIATRICS | Facility: HOSPITAL | Age: 69
End: 2024-05-20
Payer: COMMERCIAL

## 2024-05-20 PROCEDURE — RXMED WILLOW AMBULATORY MEDICATION CHARGE

## 2024-05-20 NOTE — TELEPHONE ENCOUNTER
Result Communication    11:14 AM    Results were not successfully communicated with the patient and they were unable to be reached over the phone. Will attempt to call again. Result findings for Pap smear were communicated through Rafael Newsome MD   PGY1, [facility] Med

## 2024-05-21 PROCEDURE — RXMED WILLOW AMBULATORY MEDICATION CHARGE

## 2024-05-22 ENCOUNTER — PHARMACY VISIT (OUTPATIENT)
Dept: PHARMACY | Facility: CLINIC | Age: 69
End: 2024-05-22
Payer: COMMERCIAL

## 2024-05-28 ENCOUNTER — HOSPITAL ENCOUNTER (OUTPATIENT)
Dept: CARDIOLOGY | Facility: CLINIC | Age: 69
Discharge: HOME | End: 2024-05-28
Payer: COMMERCIAL

## 2024-05-28 DIAGNOSIS — Z95.810 PRESENCE OF AUTOMATIC (IMPLANTABLE) CARDIAC DEFIBRILLATOR: ICD-10-CM

## 2024-05-28 DIAGNOSIS — I42.0 DILATED CARDIOMYOPATHY (MULTI): ICD-10-CM

## 2024-05-28 PROCEDURE — 93296 REM INTERROG EVL PM/IDS: CPT

## 2024-06-09 DIAGNOSIS — M81.0 OSTEOPOROSIS WITHOUT CURRENT PATHOLOGICAL FRACTURE, UNSPECIFIED OSTEOPOROSIS TYPE: ICD-10-CM

## 2024-06-09 PROCEDURE — RXMED WILLOW AMBULATORY MEDICATION CHARGE

## 2024-06-10 ENCOUNTER — PHARMACY VISIT (OUTPATIENT)
Dept: PHARMACY | Facility: CLINIC | Age: 69
End: 2024-06-10
Payer: COMMERCIAL

## 2024-06-11 RX ORDER — CALCIUM CARBONATE 200(500)MG
1 TABLET,CHEWABLE ORAL 2 TIMES DAILY
Qty: 60 TABLET | Refills: 2 | Status: SHIPPED | OUTPATIENT
Start: 2024-06-11

## 2024-06-15 DIAGNOSIS — L60.8 TOENAIL DEFORMITY: ICD-10-CM

## 2024-06-17 PROCEDURE — RXMED WILLOW AMBULATORY MEDICATION CHARGE

## 2024-06-17 RX ORDER — CICLOPIROX 80 MG/ML
SOLUTION TOPICAL NIGHTLY
Qty: 6.6 ML | Refills: 0 | Status: SHIPPED | OUTPATIENT
Start: 2024-06-17 | End: 2024-07-22

## 2024-06-20 PROCEDURE — RXMED WILLOW AMBULATORY MEDICATION CHARGE

## 2024-06-22 ENCOUNTER — PHARMACY VISIT (OUTPATIENT)
Dept: PHARMACY | Facility: CLINIC | Age: 69
End: 2024-06-22
Payer: COMMERCIAL

## 2024-06-22 PROCEDURE — RXMED WILLOW AMBULATORY MEDICATION CHARGE

## 2024-07-01 ENCOUNTER — HOSPITAL ENCOUNTER (OUTPATIENT)
Dept: RADIOLOGY | Facility: HOSPITAL | Age: 69
Discharge: HOME | End: 2024-07-01
Payer: COMMERCIAL

## 2024-07-01 VITALS — HEIGHT: 59 IN | WEIGHT: 121.91 LBS | BODY MASS INDEX: 24.58 KG/M2

## 2024-07-01 PROCEDURE — 77067 SCR MAMMO BI INCL CAD: CPT | Performed by: STUDENT IN AN ORGANIZED HEALTH CARE EDUCATION/TRAINING PROGRAM

## 2024-07-01 PROCEDURE — 77067 SCR MAMMO BI INCL CAD: CPT

## 2024-07-01 PROCEDURE — 77063 BREAST TOMOSYNTHESIS BI: CPT | Performed by: STUDENT IN AN ORGANIZED HEALTH CARE EDUCATION/TRAINING PROGRAM

## 2024-07-02 PROCEDURE — RXMED WILLOW AMBULATORY MEDICATION CHARGE

## 2024-07-03 ENCOUNTER — PHARMACY VISIT (OUTPATIENT)
Dept: PHARMACY | Facility: CLINIC | Age: 69
End: 2024-07-03
Payer: COMMERCIAL

## 2024-07-06 ENCOUNTER — PHARMACY VISIT (OUTPATIENT)
Dept: PHARMACY | Facility: CLINIC | Age: 69
End: 2024-07-06
Payer: COMMERCIAL

## 2024-07-06 PROCEDURE — RXMED WILLOW AMBULATORY MEDICATION CHARGE

## 2024-07-08 ENCOUNTER — OFFICE VISIT (OUTPATIENT)
Dept: CARDIOLOGY | Facility: CLINIC | Age: 69
End: 2024-07-08
Payer: COMMERCIAL

## 2024-07-08 VITALS
BODY MASS INDEX: 24.61 KG/M2 | DIASTOLIC BLOOD PRESSURE: 56 MMHG | OXYGEN SATURATION: 96 % | SYSTOLIC BLOOD PRESSURE: 96 MMHG | HEART RATE: 76 BPM | HEIGHT: 59 IN | WEIGHT: 122.1 LBS

## 2024-07-08 DIAGNOSIS — I10 PRIMARY HYPERTENSION: ICD-10-CM

## 2024-07-08 DIAGNOSIS — I50.9 HEART FAILURE, UNSPECIFIED HF CHRONICITY, UNSPECIFIED HEART FAILURE TYPE (MULTI): ICD-10-CM

## 2024-07-08 DIAGNOSIS — I10 HYPERTENSION, UNSPECIFIED TYPE: ICD-10-CM

## 2024-07-08 DIAGNOSIS — I42.0 DILATED CARDIOMYOPATHY (MULTI): ICD-10-CM

## 2024-07-08 DIAGNOSIS — I42.8 OTHER CARDIOMYOPATHY (MULTI): ICD-10-CM

## 2024-07-08 DIAGNOSIS — I50.20 HFREF (HEART FAILURE WITH REDUCED EJECTION FRACTION) (MULTI): ICD-10-CM

## 2024-07-08 PROCEDURE — 99213 OFFICE O/P EST LOW 20 MIN: CPT | Performed by: INTERNAL MEDICINE

## 2024-07-08 PROCEDURE — 1036F TOBACCO NON-USER: CPT | Performed by: INTERNAL MEDICINE

## 2024-07-08 PROCEDURE — 1126F AMNT PAIN NOTED NONE PRSNT: CPT | Performed by: INTERNAL MEDICINE

## 2024-07-08 PROCEDURE — 1159F MED LIST DOCD IN RCRD: CPT | Performed by: INTERNAL MEDICINE

## 2024-07-08 PROCEDURE — 3074F SYST BP LT 130 MM HG: CPT | Performed by: INTERNAL MEDICINE

## 2024-07-08 PROCEDURE — RXMED WILLOW AMBULATORY MEDICATION CHARGE

## 2024-07-08 PROCEDURE — 1160F RVW MEDS BY RX/DR IN RCRD: CPT | Performed by: INTERNAL MEDICINE

## 2024-07-08 PROCEDURE — 3078F DIAST BP <80 MM HG: CPT | Performed by: INTERNAL MEDICINE

## 2024-07-08 RX ORDER — METOPROLOL SUCCINATE 25 MG/1
25 TABLET, EXTENDED RELEASE ORAL DAILY
Qty: 90 TABLET | Refills: 3 | Status: SHIPPED | OUTPATIENT
Start: 2024-07-08 | End: 2025-07-08

## 2024-07-08 RX ORDER — SPIRONOLACTONE 25 MG/1
25 TABLET ORAL
Qty: 90 TABLET | Refills: 3 | Status: SHIPPED | OUTPATIENT
Start: 2024-07-08

## 2024-07-08 RX ORDER — METOPROLOL SUCCINATE 25 MG/1
25 TABLET, EXTENDED RELEASE ORAL DAILY
Qty: 30 TABLET | Refills: 11 | Status: SHIPPED | OUTPATIENT
Start: 2024-07-08 | End: 2024-07-08

## 2024-07-08 RX ORDER — ISOSORBIDE DINITRATE 20 MG/1
20 TABLET ORAL 3 TIMES DAILY
Qty: 270 TABLET | Refills: 2 | Status: SHIPPED | OUTPATIENT
Start: 2024-07-08 | End: 2025-07-07

## 2024-07-08 RX ORDER — HYDRALAZINE HYDROCHLORIDE 10 MG/1
10 TABLET, FILM COATED ORAL EVERY 8 HOURS
Qty: 540 TABLET | Refills: 1 | Status: SHIPPED | OUTPATIENT
Start: 2024-07-08 | End: 2025-07-07

## 2024-07-08 ASSESSMENT — PAIN SCALES - GENERAL: PAINLEVEL: 0-NO PAIN

## 2024-07-08 ASSESSMENT — ENCOUNTER SYMPTOMS
OCCASIONAL FEELINGS OF UNSTEADINESS: 0
LOSS OF SENSATION IN FEET: 0
DEPRESSION: 0

## 2024-07-08 NOTE — PROGRESS NOTES
Primary Care Physician: Olga Newsome MD  Date of Visit: 07/08/2024  9:40 AM EDT  Location of visit: [facility] [place] [address]     Chief Complaint:   Chief Complaint   Patient presents with    Follow-up        HPI / Summary:   Sandy Smith is a 69 y.o. female presents for followup    History of cardiomyopathy possibly related to PVC frequency, EF was around 20% in 2020 with a 39% PVC burden with episodes of nonsustained VT, underwent RFA for PVC ablation and placed on amiodarone in October 2020 with a drop in PVC burden transiently wore LifeVest.  Eventually underwent ICD implantation due to continuing low EF.  August 2023 EF of 50 to 55% with normal LV chamber size.    Feels an aching under left breast, intermittent, no trigger. Like GERD.  No spasm.  Had carpal tunnel release right hand in April.   Tingling in hand improved.  Going to [place] July 28, for 5 weeks.  No swelling, CP.  Cleans empty buildings.  Moderate ETOH, no tobacco.  5 kids,  One daughter had leukemia.              Specialty Problems          Cardiology Problems    Bradycardia    Cardiomyopathy (Multi)    Hyperlipidemia    Hypertension    ICD (implantable cardioverter-defibrillator) in place    Non-sustained ventricular tachycardia (Multi)    Chest pain    CHF (congestive heart failure) (Multi)        Past Medical History:   Diagnosis Date    Cardiomyopathy, unspecified (Multi)     CHF (congestive heart failure) (Multi)     CKD (chronic kidney disease)     Encounter for general adult medical examination without abnormal findings 01/31/2022    Encounter for initial preventive physical examination covered by Medicare    Hypertension     Otitis media, unspecified, right ear 01/31/2014    Acute right otitis media    Personal history of other diseases of the digestive system 07/21/2013    History of esophageal reflux    Personal history of other diseases of the musculoskeletal system and connective tissue 07/21/2013    History of low back pain     Personal history of other specified conditions 07/21/2013    History of angioedema          Past Surgical History:   Procedure Laterality Date    BONE GRAFT Left     CARDIAC DEFIBRILLATOR PLACEMENT      COLONOSCOPY  08/23/2013    Colonoscopy (Fiberoptic)    CT ANGIO CORONARY ART WITH HEARTFLOW IF SCORE >30%  11/20/2018    CT HEART CORONARY ANGIOGRAM [place] [facility] [place]    CT ANGIO NECK  01/29/2022    CT NECK ANGIO W AND WO IV CONTRAST 1/29/2022 [facility] CLINICAL LEGACY    KNEE SURGERY Left     OTHER SURGICAL HISTORY  02/08/2021    Pacemaker insertion    OTHER SURGICAL HISTORY  11/08/2018    Umbilical hernia repair    OTHER SURGICAL HISTORY  12/05/2013    Wrist Surgery          Social History:   reports that she quit smoking about 54 years ago. Her smoking use included cigarettes. She has been exposed to tobacco smoke. She has never used smokeless tobacco. She reports current alcohol use. She reports that she does not use drugs.      Allergies:  Allergies   Allergen Reactions    Cephalexin Unknown    Lisinopril Angioedema       Outpatient Medications:  Current Outpatient Medications   Medication Instructions    acetaminophen (TYLENOL) 650 mg, oral, Every 6 hours PRN    calcium carbonate (TUMS) 500 mg, oral, 2 times daily    cetirizine (ZyrTEC) 10 mg tablet Take 1 tablet (10 mg) by mouth once daily as needed.    cholecalciferol, vitamin D3, 10 mcg (400 unit) capsule TAKE 1 CAPSULE Daily    ciclopirox (Penlac) 8 % solution Topical, Nightly    empagliflozin (Jardiance) 10 mg TAKE 1 TABLET BY MOUTH ONCE DAILY    estradiol (ESTRACE) 2 g, vaginal, Daily, Apply to vagina nightly for 1 week then every Monday/Wednesday/Friday.    hydrALAZINE (Apresoline) 10 mg tablet TAKE 1 TABLET BY MOUTH EVERY 8 HOURS    HYDROcodone-acetaminophen (Norco) 5-325 mg tablet 1 tablet, oral, Every 6 hours PRN    isosorbide dinitrate (Isordil) 20 mg tablet TAKE 1 TABLET BY MOUTH THREE TIMES A DAY    lidocaine (Xylocaine) 5 % cream cream  Apply to area of pain once daily as needed.    methocarbamol (Robaxin) 500 mg tablet Take 1-2 tablets as needed at bedtime for muscle tightness, spasms    metoprolol succinate XL (TOPROL-XL) 25 mg, oral, Daily    NaCl-NaHCO3-hyaluron sod-aloe (NasogeL) 0.9 % spray gel USE AS DIRECTED.    ondansetron (ZOFRAN) 4 mg, oral, Every 8 hours PRN    psyllium husk, aspartame, (Metamucil Sugar-Free, aspart,) 3.4 gram/5.8 gram powder Use as directed 1-3 times daily as needed to maintain regular bowel movements.    sodium chloride-aloe vera spray,non-aerosol 1 spray each nostril at bedtime    spironolactone (Aldactone) 25 mg tablet TAKE 1 TABLET (25 MG) BY MOUTH ONCE DAILY.       ROS     Physical Exam:  There were no vitals filed for this visit.  Wt Readings from Last 5 Encounters:   07/01/24 55.3 kg (121 lb 14.6 oz)   05/01/24 55.3 kg (122 lb)   04/24/24 57 kg (125 lb 9.6 oz)   04/12/24 55.3 kg (122 lb)   03/20/24 56.2 kg (124 lb)     There is no height or weight on file to calculate BMI.     Well-developed female in no acute distress.  Flat JVP.  Normal carotid upstrokes no bruits.  Regular rhythm without gallop or murmur.  Clear lungs or crackles.  Soft abdomen without masses.  No dependent edema with intact pedal pulses  Last Labs:  CMP:  Recent Labs     03/03/24  0018 01/15/24  1606 10/17/23  2203 04/08/23  1529 01/17/23  1658    143 142 142 145   K 4.1 4.0 5.1 4.7 4.0    108* 105 110* 108*   CO2 27 26 27 26 28   ANIONGAP 14 13 15 11 13   BUN 14 15 15 13 22   CREATININE 1.11* 1.06* 1.17* 1.03 1.21*   EGFR 54* 57* 51*  --   --    GLUCOSE 91 81 81 94 76     Recent Labs     03/03/24  0018 10/17/23  2203 07/19/23  1400 04/08/23  1529 03/21/23  1031 01/17/23  1658   ALBUMIN 4.6 4.3 4.0 3.5 4.3 4.1   ALKPHOS 84  --  60 51 69 68   ALT 35  --  37 40 52* 46*   AST 36  --  33 36 45* 41*   BILITOT 0.4  --  0.5 0.5 0.4 0.4   LIPASE 35  --   --   --   --   --      CBC:  Recent Labs     03/03/24  0018 01/15/24  1606  10/17/23  2203 07/19/23  1400 04/08/23  1529   WBC 7.2 7.4 7.1 5.6 5.7   HGB 16.3* 14.2 14.9 14.0 14.2   HCT 48.7* 45.5 45.6 45.1 45.4    244 221 238 207   MCV 84 88 85 90 85     COAG:   Recent Labs     02/02/22  1141 01/29/22  0855 01/26/21  1009 01/16/21  0651 10/25/20  0712   INR 0.9 0.9 1.0 0.9 1.0     HEME/ENDO:  Recent Labs     09/11/23  1729 07/21/23  1127 01/28/22  1147 06/07/21  1118 10/24/20  0330 09/21/20  0641 09/17/20  0634 06/04/18  1558   TSH  --  6.54* 6.56* 6.83* 2.08   < >  --  2.02   HGBA1C 5.4  --  5.8*  --   --   --  5.4 5.4    < > = values in this interval not displayed.      CARDIAC:   Recent Labs     03/03/24  0154 03/03/24  0018 01/15/24  1606 04/08/23  1529 07/20/22  2120 10/21/20  1825 09/20/20  2210   TROPHS 9 13  --  14 10  --   --    BNP  --  48 66 79  --  910* 356*     Recent Labs     07/21/23  1127 09/17/20  0634 06/15/18  1302   CHOL 191 189 172   LDLF 97 104* 92   HDL 74.4 68.1 62.9   TRIG 98 84 88       Last Cardiology Tests:  ECG:      Echo:  Echo Results:  No results found for this or any previous visit from the past 3650 days.       Cath:      Stress Test:  Stress Results:  No results found for this or any previous visit from the past 365 days.         Cardiac Imaging:  BI mammo bilateral screening tomosynthesis  Narrative: Interpreted By:  Riky Shannon and Stephens Katherine   STUDY:  BI MAMMO BILATERAL SCREENING TOMOSYNTHESIS;  7/1/2024 10:04 am      ACCESSION NUMBER(S):  [id number]      ORDERING CLINICIAN:  SUNSHINE RASCON      INDICATION:  Screening.      COMPARISON:  06/01/2023 and 09/25/2020      FINDINGS:  2D and tomosynthesis images were reviewed at 1 mm slice thickness. A  pacemaker overlies the left axillary region limiting underlying  evaluation.      Density:  There are areas of scattered fibroglandular tissue.      No suspicious masses or calcifications are identified.      Impression: No mammographic evidence of malignancy.      BI-RADS CATEGORY:  BI-RADS  Category:  1 Negative.  Recommendation:  Annual Screening.  Recommended Date:  1 Year.  Laterality:  Bilateral.              For any future breast imaging appointments, please call [phone]  ([phone]).              I personally reviewed the images/study and I agree with Jackeline Cary DO's (radiology resident) findings as stated. This study  was interpreted at [facility],  [place], [place].      MACRO:  None      Signed by: Riky Shannon 7/5/2024 10:29 AM  Dictation workstation:   [id number]        Assessment/Plan       She seems to be stable from the standpoint of her PVCs, history of cardiomyopathy with no signs of heart failure or recurrent arrhythmia.  She is stable for travel abroad by my estimate.      Orders:  No orders of the defined types were placed in this encounter.     Followup Appts:  Future Appointments   Date Time Provider Department Center   7/17/2024  2:45 PM Will SAMIR Hastings DO [id number] [facility]   7/18/2024  8:45 AM Joaquín Urbina OD [id number] [facility]   8/19/2024 10:30 AM Arun Garcia MD [id number] [facility]           ____________________________________________________________  Lupillo Haddad MD    Senior Attending Physician  [facility]  [facility]

## 2024-07-13 PROCEDURE — RXMED WILLOW AMBULATORY MEDICATION CHARGE

## 2024-07-15 DIAGNOSIS — L60.8 TOENAIL DEFORMITY: ICD-10-CM

## 2024-07-15 PROCEDURE — RXMED WILLOW AMBULATORY MEDICATION CHARGE

## 2024-07-15 RX ORDER — CICLOPIROX 80 MG/ML
SOLUTION TOPICAL NIGHTLY
Qty: 6.6 ML | Refills: 0 | OUTPATIENT
Start: 2024-07-15 | End: 2024-08-14

## 2024-07-17 ENCOUNTER — PHARMACY VISIT (OUTPATIENT)
Dept: PHARMACY | Facility: CLINIC | Age: 69
End: 2024-07-17
Payer: COMMERCIAL

## 2024-07-18 ENCOUNTER — APPOINTMENT (OUTPATIENT)
Dept: OPHTHALMOLOGY | Facility: CLINIC | Age: 69
End: 2024-07-18
Payer: COMMERCIAL

## 2024-07-18 DIAGNOSIS — H25.13 NUCLEAR SCLEROSIS OF BOTH EYES: Primary | ICD-10-CM

## 2024-07-18 DIAGNOSIS — Z98.890 HISTORY OF REPAIR OF RETINAL TEAR BY LASER PHOTOCOAGULATION: ICD-10-CM

## 2024-07-18 DIAGNOSIS — H52.01 HYPEROPIA, RIGHT: ICD-10-CM

## 2024-07-18 DIAGNOSIS — Z01.00 ENCOUNTER FOR VISION SCREENING: ICD-10-CM

## 2024-07-18 DIAGNOSIS — H52.12 MYOPIA, LEFT EYE: ICD-10-CM

## 2024-07-18 DIAGNOSIS — H43.813 PVD (POSTERIOR VITREOUS DETACHMENT), BOTH EYES: ICD-10-CM

## 2024-07-18 PROCEDURE — 92015 DETERMINE REFRACTIVE STATE: CPT | Performed by: STUDENT IN AN ORGANIZED HEALTH CARE EDUCATION/TRAINING PROGRAM

## 2024-07-18 PROCEDURE — 92014 COMPRE OPH EXAM EST PT 1/>: CPT | Performed by: STUDENT IN AN ORGANIZED HEALTH CARE EDUCATION/TRAINING PROGRAM

## 2024-07-18 ASSESSMENT — CONF VISUAL FIELD
OS_SUPERIOR_NASAL_RESTRICTION: 0
OD_SUPERIOR_TEMPORAL_RESTRICTION: 0
OD_SUPERIOR_NASAL_RESTRICTION: 0
OD_INFERIOR_NASAL_RESTRICTION: 0
OS_NORMAL: 1
OS_INFERIOR_NASAL_RESTRICTION: 0
METHOD: COUNTING FINGERS
OS_INFERIOR_TEMPORAL_RESTRICTION: 0
OS_SUPERIOR_TEMPORAL_RESTRICTION: 0
OD_INFERIOR_TEMPORAL_RESTRICTION: 0
OD_NORMAL: 1

## 2024-07-18 ASSESSMENT — VISUAL ACUITY
OD_SC: 20/25
METHOD: SNELLEN - LINEAR
OS_PH_SC+: -1
OS_PH_SC: 20/30
OS_BAT_MED: 20/40
OS_SC: 20/40

## 2024-07-18 ASSESSMENT — REFRACTION_MANIFEST
OD_ADD: +2.50
OD_SPHERE: +0.25
METHOD_AUTOREFRACTION: 1
OS_ADD: +2.50
OD_CYLINDER: -1.00
OS_SPHERE: -1.00
OD_CYLINDER: -0.50
OS_AXIS: 100
OS_SPHERE: -0.75
OS_CYLINDER: SPHERE
OD_AXIS: 125
OS_CYLINDER: -0.25
OD_AXIS: 110
OD_SPHERE: +0.50

## 2024-07-18 ASSESSMENT — ENCOUNTER SYMPTOMS
GASTROINTESTINAL NEGATIVE: 0
MUSCULOSKELETAL NEGATIVE: 0
CONSTITUTIONAL NEGATIVE: 0
RESPIRATORY NEGATIVE: 0
CARDIOVASCULAR NEGATIVE: 0
EYES NEGATIVE: 0
HEMATOLOGIC/LYMPHATIC NEGATIVE: 0
ALLERGIC/IMMUNOLOGIC NEGATIVE: 0
PSYCHIATRIC NEGATIVE: 0
ENDOCRINE NEGATIVE: 0
NEUROLOGICAL NEGATIVE: 0

## 2024-07-18 ASSESSMENT — SLIT LAMP EXAM - LIDS
COMMENTS: GOOD POSITION
COMMENTS: GOOD POSITION

## 2024-07-18 ASSESSMENT — EXTERNAL EXAM - LEFT EYE: OS_EXAM: NORMAL

## 2024-07-18 ASSESSMENT — TONOMETRY
OS_IOP_MMHG: 16
OD_IOP_MMHG: 16
IOP_METHOD: GOLDMANN APPLANATION

## 2024-07-18 ASSESSMENT — CUP TO DISC RATIO
OD_RATIO: .4
OS_RATIO: .4

## 2024-07-18 ASSESSMENT — EXTERNAL EXAM - RIGHT EYE: OD_EXAM: NORMAL

## 2024-07-18 NOTE — PROGRESS NOTES
Assessment/Plan   Diagnoses and all orders for this visit:  Nuclear sclerosis of both eyes  Myopia, left eye  Hyperopia, right  -cataracts becoming visually significant with myopic shift in RX and patient noticing glare while night driving with headlights  -BCVA with MRX today OD 20/20 OS 20/25  -pt ed to monitor at this time  History of repair of retinal tear by laser photocoagulation  PVD (posterior vitreous detachment), both eyes  -retinal exam stable  -Discussed signs and symtpoms of retinal hole, tear, detachment. Patient consents to return if they notice new floaters, flashes, curtain or veil covering vision.    RTC 1 year for annual with DFE and MRX

## 2024-07-19 ENCOUNTER — APPOINTMENT (OUTPATIENT)
Dept: ORTHOPEDIC SURGERY | Facility: CLINIC | Age: 69
End: 2024-07-19
Payer: COMMERCIAL

## 2024-07-19 DIAGNOSIS — M19.049 CMC ARTHRITIS: Primary | ICD-10-CM

## 2024-07-19 PROCEDURE — 99024 POSTOP FOLLOW-UP VISIT: CPT | Performed by: STUDENT IN AN ORGANIZED HEALTH CARE EDUCATION/TRAINING PROGRAM

## 2024-08-05 NOTE — PROGRESS NOTES
[facility]  Hand and Upper Extremity Service  Post Operative visit        Date of surgery: 4/25/2024  Surgery(s) performed: Left carpal tunnel release        Subjective report:   Patient is approximately 3 months postop left carpal tunnel release.  She is doing well.  She notes the numbness and tingling has improved she notes her pain in her hand has improved.  She is no longer waking up having to shake out her hand.  She is pleased with her results.  She would like to return to work.  She denies fevers and chills        Exam findings; left upper extremity  Well healed incision.  There is no erythema, warmth, drainage.  There is no significant swelling present.  Sutures were removed.  Sensation is intact to light touch to radial, ulnar, median nerves.  AIN, PIN, ulnar motor nerves intact.  Brisk capillary refill.  She has good opposition of her thumb with good APB muscle strength.    TTP bilateral thumb cmc joints. Positive CMC grind test bilaterally.        Radiograph findings: None        Plan:   Pt was given comfort cool braces and will trail diclofenac gel. She may return for corticosteroid injections as needed  Follow-up as needed     Medications Prescribed: None           Will Beucler, DO  [employer] [employer] [facility] [employer]  Department of Orthopaedic Surgery  Hand and Upper Extremity Surgery  [facility]     Dictation performed with the use of voice recognition software. Syntax and grammatical errors may exist.

## 2024-08-19 ENCOUNTER — APPOINTMENT (OUTPATIENT)
Dept: DERMATOLOGY | Facility: CLINIC | Age: 69
End: 2024-08-19
Payer: COMMERCIAL

## 2024-08-27 ENCOUNTER — HOSPITAL ENCOUNTER (OUTPATIENT)
Dept: CARDIOLOGY | Facility: CLINIC | Age: 69
Discharge: HOME | End: 2024-08-27
Payer: COMMERCIAL

## 2024-08-27 DIAGNOSIS — I42.9 CARDIOMYOPATHY, UNSPECIFIED TYPE (MULTI): ICD-10-CM

## 2024-08-27 PROCEDURE — 93296 REM INTERROG EVL PM/IDS: CPT

## 2024-08-27 PROCEDURE — 93295 DEV INTERROG REMOTE 1/2/MLT: CPT | Performed by: INTERNAL MEDICINE

## 2024-10-06 ENCOUNTER — PHARMACY VISIT (OUTPATIENT)
Dept: PHARMACY | Facility: CLINIC | Age: 69
End: 2024-10-06
Payer: COMMERCIAL

## 2024-10-06 PROCEDURE — RXMED WILLOW AMBULATORY MEDICATION CHARGE

## 2024-10-07 ENCOUNTER — APPOINTMENT (OUTPATIENT)
Dept: PRIMARY CARE | Facility: CLINIC | Age: 69
End: 2024-10-07
Payer: COMMERCIAL

## 2024-10-07 VITALS
TEMPERATURE: 96.8 F | WEIGHT: 122 LBS | OXYGEN SATURATION: 96 % | DIASTOLIC BLOOD PRESSURE: 77 MMHG | HEART RATE: 79 BPM | SYSTOLIC BLOOD PRESSURE: 122 MMHG | BODY MASS INDEX: 24.6 KG/M2 | HEIGHT: 59 IN

## 2024-10-07 DIAGNOSIS — D17.22 LIPOMA OF LEFT UPPER EXTREMITY: ICD-10-CM

## 2024-10-07 DIAGNOSIS — Z00.00 ROUTINE GENERAL MEDICAL EXAMINATION AT HEALTH CARE FACILITY: Primary | ICD-10-CM

## 2024-10-07 PROCEDURE — 1170F FXNL STATUS ASSESSED: CPT

## 2024-10-07 PROCEDURE — 1126F AMNT PAIN NOTED NONE PRSNT: CPT

## 2024-10-07 PROCEDURE — 3008F BODY MASS INDEX DOCD: CPT

## 2024-10-07 PROCEDURE — 3074F SYST BP LT 130 MM HG: CPT

## 2024-10-07 PROCEDURE — 1159F MED LIST DOCD IN RCRD: CPT

## 2024-10-07 PROCEDURE — G0439 PPPS, SUBSEQ VISIT: HCPCS

## 2024-10-07 PROCEDURE — 3078F DIAST BP <80 MM HG: CPT

## 2024-10-07 ASSESSMENT — ACTIVITIES OF DAILY LIVING (ADL)
MANAGING_FINANCES: INDEPENDENT
DRESSING: INDEPENDENT
GROCERY_SHOPPING: INDEPENDENT
DOING_HOUSEWORK: INDEPENDENT
TAKING_MEDICATION: INDEPENDENT
BATHING: INDEPENDENT

## 2024-10-07 ASSESSMENT — ENCOUNTER SYMPTOMS
LOSS OF SENSATION IN FEET: 0
OCCASIONAL FEELINGS OF UNSTEADINESS: 0
DEPRESSION: 0

## 2024-10-07 ASSESSMENT — PAIN SCALES - GENERAL: PAINLEVEL: 0-NO PAIN

## 2024-10-07 NOTE — PROGRESS NOTES
Subjective   Reason for Visit: Sandy Smith is an 69 y.o. female here for a Medicare Wellness visit.     Past Medical, Surgical, and Family History reviewed and updated in chart.    Reviewed all medications by prescribing practitioner or clinical pharmacist (such as prescriptions, OTCs, herbal therapies and supplements) and documented in the medical record.    HPI    # Medicare Wellness Questionnaire  - Age: 69 y.o.  - Gender: female   - During the past 4 weeks, how much have you been bothered by emotional problems such as feeling anxious, depressed, irritable, sad, or downhearted and blue? Not at all  - During the past 4 weeks, history of physical and emotional health limited your social activities with family, friends, neighbors, or groups? Not at all  - During the past 4 weeks, how much bodily pain have you generally had? No pain  - During the past 4 weeks, was someone available to help you if you needed and wanted help?  (For example, if you felt very nervous, lonely, or blue; got sick and had to stay in bed; needed someone to talk to; needed help with daily chores; or needed help just taking care of yourself.) Yes, as much as I wanted  - During the past 4 weeks, what was the hardest physical activity you could do for at least 2 minutes? Heavy  - ADLS/IADLs:  --- Can you get to places out of walking distance without help?  (For example, can you travel alone on buses or taxis, or drive your own car?) Yes  --- Can you go shopping for groceries or clothes without someone's help? Yes  --- Can you prepare your own meals? Yes  --- Can you do your housework without help? Yes  --- Because of any health problems, do you need the help of another person with your personal care needs such as eating, bathing, dressing, or getting around the house? No  --- Can you handle your own money without help? Yes  - During the past 4 weeks, how would you rate your health in general?Good  - How have things been going for you during  "the past 4 weeks? Very well; could hardly be better  - Are you having difficulties driving your car? No  - Do you always fasten your seatbelt when you are in a car? Yes, usually  - How often during the past 4 weeks have you been bothered by any of the following problems?  --- Falling or dizzy when standing up. Never  --- Sexual problems. Seldom  --- Trouble eating well. Never  --- Teeth or denture problems. Seldom  --- Problems using the telephone. Never  --- Tiredness or fatigue. Never  - Have you fallen 2 or more times in the past year? No  - Are you afraid of falling? No  - Are you a smoker? No  - During the past 4 weeks, how many drinks of wine, beer, or other alcoholic beverages did you have? 2-5 drinks per week, ~1-2 per week, while on vacation   - Do you exercise for about 20 minutes, 3 or more days a week? Yes, most of the time  - Have you been given any information to help you with the following:  --- Hazards in your house that might hurt you? No  --- Keeping track of your medications? Yes  - How often do you have trouble taking medicines the way you have been told to take them? I always take them as prescribed  - How confident are you that you can control and manage most of your health problems? Very confident  - What is your race?  (Check all that apply) Black or -American    Review of Systems   All other systems reviewed and are negative.      Objective   Vitals:  /77 (BP Location: Left arm, Patient Position: Sitting, BP Cuff Size: Adult)   Pulse 79   Temp 36 °C (96.8 °F) (Temporal)   Ht 1.499 m (4' 11\")   Wt 55.3 kg (122 lb)   SpO2 96%   BMI 24.64 kg/m²       Physical Exam  Vitals reviewed.   Constitutional:       General: She is not in acute distress.     Appearance: Normal appearance.   HENT:      Head: Atraumatic.      Right Ear: External ear normal.      Left Ear: External ear normal.      Mouth/Throat:      Mouth: Mucous membranes are moist.      Pharynx: Oropharynx is clear. "   Eyes:      Extraocular Movements: Extraocular movements intact.      Conjunctiva/sclera: Conjunctivae normal.      Pupils: Pupils are equal, round, and reactive to light.   Cardiovascular:      Rate and Rhythm: Normal rate and regular rhythm.      Pulses: Normal pulses.      Heart sounds: Normal heart sounds. No murmur heard.     No gallop.   Pulmonary:      Effort: No respiratory distress.      Breath sounds: Normal breath sounds. No wheezing or rales.   Abdominal:      General: Bowel sounds are normal.      Palpations: There is no mass.      Tenderness: There is no abdominal tenderness. There is no guarding.   Musculoskeletal:         General: No swelling. Normal range of motion.      Cervical back: Normal range of motion. No tenderness.   Lymphadenopathy:      Cervical: No cervical adenopathy.   Skin:     General: Skin is warm and dry.      Capillary Refill: Capillary refill takes less than 2 seconds.      Comments: L lateral forearm and R midclavicular mobile nontender nodule    Neurological:      Mental Status: She is alert.         Assessment & Plan       #HM   - Colonoscopy completed 2021: repeat in 5 years, Due 2026  - Pap smear: no longer indicated   - Mammogram 3/2024. Repeat due next year   - Flu and Covid vaccine at [facility]     #Lipomas   - L forearm and R clavicular nontender mobile lipomas  - Referred to dermatologist for removal     #Flu like symptoms   - now resolved, s/p calcium patch, lozenge, and tea     Advance Directives Discussion  16 - 20 minutes were spent discussing Advanced Care Planning (including a Living Will, Medical Power Of , as well as specific end of life choices and/or directives). The details of that discussion were documented in Advanced Directives Discussion section of the medical record.      Patient seen and d/w attending Dr. Loc Newsome MD   Family Medicine, PGY2

## 2024-10-07 NOTE — PATIENT INSTRUCTIONS
Thank you for coming in to see us today, Ms. Sandy Smith! It was a pleasure managing your health together.    Today in clinic, we discussed medicare annual wellness visit.    If we ordered bloodwork today, you can get this done at ANY  location and the results will come to me directly. The [facility] here at [facility] are walk-in (no appointment needed); [facility]'s hours are M-F 6:30a-6p and Sat 8a-12p.    If you have any questions/concerns, you can always use [employer] to message me directly. Or if you prefer, you can call the office at [phone]; if you leave a message, the office staff should translate this to a message in my inbox.    I'm looking forward to seeing you back in clinic in 4-6 months to discuss chronic conditions.    Eliel,  Dr. Newsome

## 2024-10-24 PROCEDURE — RXMED WILLOW AMBULATORY MEDICATION CHARGE

## 2024-10-28 PROCEDURE — RXMED WILLOW AMBULATORY MEDICATION CHARGE

## 2024-10-30 ENCOUNTER — PHARMACY VISIT (OUTPATIENT)
Dept: PHARMACY | Facility: CLINIC | Age: 69
End: 2024-10-30
Payer: COMMERCIAL

## 2024-11-10 ENCOUNTER — HOSPITAL ENCOUNTER (OUTPATIENT)
Facility: HOSPITAL | Age: 69
Setting detail: OBSERVATION
Discharge: HOME | End: 2024-11-11
Attending: EMERGENCY MEDICINE | Admitting: EMERGENCY MEDICINE
Payer: COMMERCIAL

## 2024-11-10 ENCOUNTER — APPOINTMENT (OUTPATIENT)
Dept: RADIOLOGY | Facility: HOSPITAL | Age: 69
End: 2024-11-10
Payer: COMMERCIAL

## 2024-11-10 ENCOUNTER — CLINICAL SUPPORT (OUTPATIENT)
Dept: EMERGENCY MEDICINE | Facility: HOSPITAL | Age: 69
End: 2024-11-10
Payer: COMMERCIAL

## 2024-11-10 DIAGNOSIS — J06.9 VIRAL URI WITH COUGH: ICD-10-CM

## 2024-11-10 DIAGNOSIS — R07.9 CHEST PAIN, UNSPECIFIED TYPE: Primary | ICD-10-CM

## 2024-11-10 PROBLEM — E11.9 TYPE 2 DIABETES MELLITUS WITHOUT COMPLICATIONS (MULTI): Status: ACTIVE | Noted: 2024-11-10

## 2024-11-10 PROBLEM — M31.6 OTHER GIANT CELL ARTERITIS: Status: ACTIVE | Noted: 2024-11-10

## 2024-11-10 LAB
ALBUMIN SERPL BCP-MCNC: 4.2 G/DL (ref 3.4–5)
ALP SERPL-CCNC: 69 U/L (ref 33–136)
ALT SERPL W P-5'-P-CCNC: 29 U/L (ref 7–45)
ANION GAP SERPL CALC-SCNC: 15 MMOL/L (ref 10–20)
AST SERPL W P-5'-P-CCNC: 33 U/L (ref 9–39)
BASOPHILS # BLD AUTO: 0.03 X10*3/UL (ref 0–0.1)
BASOPHILS NFR BLD AUTO: 0.4 %
BILIRUB SERPL-MCNC: 0.4 MG/DL (ref 0–1.2)
BUN SERPL-MCNC: 16 MG/DL (ref 6–23)
CALCIUM SERPL-MCNC: 9.4 MG/DL (ref 8.6–10.6)
CARDIAC TROPONIN I PNL SERPL HS: 11 NG/L (ref 0–34)
CARDIAC TROPONIN I PNL SERPL HS: 13 NG/L (ref 0–34)
CHLORIDE SERPL-SCNC: 107 MMOL/L (ref 98–107)
CO2 SERPL-SCNC: 22 MMOL/L (ref 21–32)
CREAT SERPL-MCNC: 1.06 MG/DL (ref 0.5–1.05)
EGFRCR SERPLBLD CKD-EPI 2021: 57 ML/MIN/1.73M*2
EOSINOPHIL # BLD AUTO: 0.26 X10*3/UL (ref 0–0.7)
EOSINOPHIL NFR BLD AUTO: 3.3 %
ERYTHROCYTE [DISTWIDTH] IN BLOOD BY AUTOMATED COUNT: 13.6 % (ref 11.5–14.5)
FLUAV RNA RESP QL NAA+PROBE: NOT DETECTED
FLUBV RNA RESP QL NAA+PROBE: NOT DETECTED
GLUCOSE SERPL-MCNC: 115 MG/DL (ref 74–99)
HCT VFR BLD AUTO: 44.7 % (ref 36–46)
HGB BLD-MCNC: 15.6 G/DL (ref 12–16)
IMM GRANULOCYTES # BLD AUTO: 0.03 X10*3/UL (ref 0–0.7)
IMM GRANULOCYTES NFR BLD AUTO: 0.4 % (ref 0–0.9)
LYMPHOCYTES # BLD AUTO: 2.39 X10*3/UL (ref 1.2–4.8)
LYMPHOCYTES NFR BLD AUTO: 30.7 %
MCH RBC QN AUTO: 28.2 PG (ref 26–34)
MCHC RBC AUTO-ENTMCNC: 34.9 G/DL (ref 32–36)
MCV RBC AUTO: 81 FL (ref 80–100)
MONOCYTES # BLD AUTO: 0.55 X10*3/UL (ref 0.1–1)
MONOCYTES NFR BLD AUTO: 7.1 %
NEUTROPHILS # BLD AUTO: 4.53 X10*3/UL (ref 1.2–7.7)
NEUTROPHILS NFR BLD AUTO: 58.1 %
NRBC BLD-RTO: 0 /100 WBCS (ref 0–0)
PLATELET # BLD AUTO: 264 X10*3/UL (ref 150–450)
POTASSIUM SERPL-SCNC: 3.6 MMOL/L (ref 3.5–5.3)
PROT SERPL-MCNC: 7.8 G/DL (ref 6.4–8.2)
RBC # BLD AUTO: 5.53 X10*6/UL (ref 4–5.2)
SARS-COV-2 RNA RESP QL NAA+PROBE: NOT DETECTED
SODIUM SERPL-SCNC: 140 MMOL/L (ref 136–145)
WBC # BLD AUTO: 7.8 X10*3/UL (ref 4.4–11.3)

## 2024-11-10 PROCEDURE — 2500000001 HC RX 250 WO HCPCS SELF ADMINISTERED DRUGS (ALT 637 FOR MEDICARE OP)

## 2024-11-10 PROCEDURE — 99285 EMERGENCY DEPT VISIT HI MDM: CPT | Mod: 25

## 2024-11-10 PROCEDURE — 84484 ASSAY OF TROPONIN QUANT: CPT

## 2024-11-10 PROCEDURE — 71046 X-RAY EXAM CHEST 2 VIEWS: CPT

## 2024-11-10 PROCEDURE — 80053 COMPREHEN METABOLIC PANEL: CPT

## 2024-11-10 PROCEDURE — 71046 X-RAY EXAM CHEST 2 VIEWS: CPT | Mod: FOREIGN READ | Performed by: RADIOLOGY

## 2024-11-10 PROCEDURE — 87636 SARSCOV2 & INF A&B AMP PRB: CPT

## 2024-11-10 PROCEDURE — 36415 COLL VENOUS BLD VENIPUNCTURE: CPT

## 2024-11-10 PROCEDURE — G0378 HOSPITAL OBSERVATION PER HR: HCPCS

## 2024-11-10 PROCEDURE — 93005 ELECTROCARDIOGRAM TRACING: CPT

## 2024-11-10 PROCEDURE — 85025 COMPLETE CBC W/AUTO DIFF WBC: CPT

## 2024-11-10 RX ORDER — METOPROLOL TARTRATE 1 MG/ML
5 INJECTION, SOLUTION INTRAVENOUS ONCE AS NEEDED
Status: DISCONTINUED | OUTPATIENT
Start: 2024-11-10 | End: 2024-11-11 | Stop reason: HOSPADM

## 2024-11-10 RX ORDER — NITROGLYCERIN 0.4 MG/1
0.8 TABLET SUBLINGUAL ONCE
Status: COMPLETED | OUTPATIENT
Start: 2024-11-10 | End: 2024-11-11

## 2024-11-10 RX ORDER — METOPROLOL SUCCINATE 25 MG/1
25 TABLET, EXTENDED RELEASE ORAL DAILY
Status: DISCONTINUED | OUTPATIENT
Start: 2024-11-10 | End: 2024-11-11 | Stop reason: HOSPADM

## 2024-11-10 RX ORDER — LORAZEPAM 2 MG/ML
0.5 INJECTION INTRAMUSCULAR EVERY 5 MIN PRN
Status: DISCONTINUED | OUTPATIENT
Start: 2024-11-10 | End: 2024-11-11 | Stop reason: HOSPADM

## 2024-11-10 RX ORDER — ONDANSETRON 4 MG/1
4 TABLET, FILM COATED ORAL EVERY 6 HOURS PRN
Status: DISCONTINUED | OUTPATIENT
Start: 2024-11-10 | End: 2024-11-11 | Stop reason: HOSPADM

## 2024-11-10 RX ORDER — METOPROLOL TARTRATE 100 MG/1
100 TABLET ORAL ONCE AS NEEDED
Status: COMPLETED | OUTPATIENT
Start: 2024-11-10 | End: 2024-11-11

## 2024-11-10 RX ORDER — ACETAMINOPHEN 325 MG/1
650 TABLET ORAL EVERY 6 HOURS PRN
Status: DISCONTINUED | OUTPATIENT
Start: 2024-11-10 | End: 2024-11-11 | Stop reason: HOSPADM

## 2024-11-10 RX ORDER — FAMOTIDINE 20 MG/1
20 TABLET, FILM COATED ORAL DAILY
Status: DISCONTINUED | OUTPATIENT
Start: 2024-11-10 | End: 2024-11-11 | Stop reason: HOSPADM

## 2024-11-10 RX ORDER — METOPROLOL TARTRATE 100 MG/1
100 TABLET ORAL ONCE
Status: DISCONTINUED | OUTPATIENT
Start: 2024-11-10 | End: 2024-11-11 | Stop reason: HOSPADM

## 2024-11-10 RX ORDER — BENZONATATE 100 MG/1
100 CAPSULE ORAL ONCE
Status: COMPLETED | OUTPATIENT
Start: 2024-11-10 | End: 2024-11-10

## 2024-11-10 RX ORDER — GUAIFENESIN 600 MG/1
1200 TABLET, EXTENDED RELEASE ORAL 2 TIMES DAILY PRN
Status: DISCONTINUED | OUTPATIENT
Start: 2024-11-10 | End: 2024-11-11 | Stop reason: HOSPADM

## 2024-11-10 RX ORDER — ISOSORBIDE DINITRATE 20 MG/1
20 TABLET ORAL
Status: DISCONTINUED | OUTPATIENT
Start: 2024-11-10 | End: 2024-11-11 | Stop reason: HOSPADM

## 2024-11-10 RX ORDER — SPIRONOLACTONE 25 MG/1
25 TABLET ORAL DAILY
Status: DISCONTINUED | OUTPATIENT
Start: 2024-11-10 | End: 2024-11-11 | Stop reason: HOSPADM

## 2024-11-10 RX ORDER — METOPROLOL TARTRATE 1 MG/ML
5 INJECTION, SOLUTION INTRAVENOUS ONCE
Status: COMPLETED | OUTPATIENT
Start: 2024-11-10 | End: 2024-11-11

## 2024-11-10 RX ORDER — METOPROLOL TARTRATE 1 MG/ML
5 INJECTION, SOLUTION INTRAVENOUS ONCE AS NEEDED
Status: COMPLETED | OUTPATIENT
Start: 2024-11-10 | End: 2024-11-11

## 2024-11-10 RX ORDER — HYDRALAZINE HYDROCHLORIDE 10 MG/1
10 TABLET, FILM COATED ORAL EVERY 8 HOURS SCHEDULED
Status: DISCONTINUED | OUTPATIENT
Start: 2024-11-10 | End: 2024-11-11 | Stop reason: HOSPADM

## 2024-11-10 RX ORDER — ALUMINUM HYDROXIDE, MAGNESIUM HYDROXIDE, AND SIMETHICONE 1200; 120; 1200 MG/30ML; MG/30ML; MG/30ML
10 SUSPENSION ORAL ONCE
Status: COMPLETED | OUTPATIENT
Start: 2024-11-10 | End: 2024-11-10

## 2024-11-10 ASSESSMENT — PAIN - FUNCTIONAL ASSESSMENT
PAIN_FUNCTIONAL_ASSESSMENT: 0-10
PAIN_FUNCTIONAL_ASSESSMENT: 0-10

## 2024-11-10 ASSESSMENT — HEART SCORE
HEART SCORE: 5
HISTORY: SLIGHTLY SUSPICIOUS
RISK FACTORS: >2 RISK FACTORS OR HX OF ATHEROSCLEROTIC DISEASE
AGE: 65+
TROPONIN: LESS THAN OR EQUAL TO NORMAL LIMIT
ECG: NON-SPECIFIC REPOLARIZATION DISTURBANCE

## 2024-11-10 ASSESSMENT — LIFESTYLE VARIABLES
EVER FELT BAD OR GUILTY ABOUT YOUR DRINKING: NO
HAVE PEOPLE ANNOYED YOU BY CRITICIZING YOUR DRINKING: NO
TOTAL SCORE: 0
HAVE YOU EVER FELT YOU SHOULD CUT DOWN ON YOUR DRINKING: NO
EVER HAD A DRINK FIRST THING IN THE MORNING TO STEADY YOUR NERVES TO GET RID OF A HANGOVER: NO

## 2024-11-10 ASSESSMENT — PAIN SCALES - GENERAL
PAINLEVEL_OUTOF10: 0 - NO PAIN

## 2024-11-10 ASSESSMENT — COLUMBIA-SUICIDE SEVERITY RATING SCALE - C-SSRS
6. HAVE YOU EVER DONE ANYTHING, STARTED TO DO ANYTHING, OR PREPARED TO DO ANYTHING TO END YOUR LIFE?: NO
2. HAVE YOU ACTUALLY HAD ANY THOUGHTS OF KILLING YOURSELF?: NO
1. IN THE PAST MONTH, HAVE YOU WISHED YOU WERE DEAD OR WISHED YOU COULD GO TO SLEEP AND NOT WAKE UP?: NO

## 2024-11-10 NOTE — H&P
"History and Physical  Clinical Decision Unit  Patient: Sandy Smith  MRN: [id number]  : 1955    Limitations to history: None  External Records Reviewed: Patient's chart    Patient History:  Sandy Smith is a 69 y.o. female with a past medical history significant for cardiomyopathy, hepatitis C, GERD, HLD, ICD, temporal arteritis, CHF, and DM2 who presented to the emergency department with concerns of URI symptoms and tingling of her left breast.  Patient endorsed various URI symptoms for the past 2 days consisting of sinus pressure, nasal congestion, sore throat, and a cough.  She denies any shortness of breath.  She also endorses tingling and warm sensation in her left breast for the past day.  Patient stated it felt like reflux, although she does not have a history of gastric reflux.  She denies previous similar events or any breast/nipple changes.    Workup in the ED consisted of EKG, labs, and imaging.  EKG did reveal new T wave inversions in leads II, III, aVF, and V4 through V6.  Viral swabs unremarkable.  CBC within normal limits.  CMP with very mild hyperglycemia of 115.  Creatinine elevated at 1.06 and GFR low at 57.  These appear similar to previous values within the past year.  2 sets of troponin normal at 13 and 11.  Chest x-ray revealed nodular soft tissue thickening of the lower neck and supraclavicular regions bilaterally.  Patient was treated in the ED with a dose of Tessalon Perles and Mylanta.  She was sent to the CDU to undergo a cardiac workup given her age and risk factors.  Of note, patient does have a defibrillator in place.    Upon admission to the Clinical Decision Unit, patient reports continued URI symptoms as noted above.  She describes her left breast symptoms as \"warmth.\"  She continues to deny any breast or nipple changes.  She does states she is currently grieving over the loss of a close friend.  Patient denies any fevers, chills, chest pain/tightness, shortness of " breath, difficulty breathing, abdominal pain, nausea/vomiting, or changes in urinary/bowel habits.    The acute evaluation included:  Orders Placed This Encounter   Procedures    XR chest 2 views    CT angio coronary art with heartflow if score >30%    CBC and Auto Differential    Comprehensive metabolic panel    Troponin I Series, High Sensitivity (0, 1 HR)    Influenza A, and B PCR    Sars-CoV-2 PCR    Troponin I, High Sensitivity, Initial    Troponin, High Sensitivity, 1 Hour    NPO Diet; Effective midnight    Adult diet Cardiac; 70 gm fat; 2 - 3 grams Sodium    Vital signs    Vital Signs    Notify provider    Nursing communication for Metoprolol/Labetalol dosing    Initiate droplet plus isolation    ECG 12 lead    Insert and maintain peripheral IV    Saline lock IV    Initiate Observation Send to CDU    Initiate observation status       I reviewed the below labs and imaging as ordered by the ED provider:  XR chest 2 views   Final Result   No acute thoracic findings. Nodular soft tissue thickening noted about   the lower neck and supraclavicular regions bilaterally.   Signed by Jose Gonzalez II, MD      CT angio coronary art with heartflow if score >30%    (Results Pending)       Labs Reviewed   CBC WITH AUTO DIFFERENTIAL - Abnormal       Result Value    WBC 7.8      nRBC 0.0      RBC 5.53 (*)     Hemoglobin 15.6      Hematocrit 44.7      MCV 81      MCH 28.2      MCHC 34.9      RDW 13.6      Platelets 264      Neutrophils % 58.1      Immature Granulocytes %, Automated 0.4      Lymphocytes % 30.7      Monocytes % 7.1      Eosinophils % 3.3      Basophils % 0.4      Neutrophils Absolute 4.53      Immature Granulocytes Absolute, Automated 0.03      Lymphocytes Absolute 2.39      Monocytes Absolute 0.55      Eosinophils Absolute 0.26      Basophils Absolute 0.03     COMPREHENSIVE METABOLIC PANEL - Abnormal    Glucose 115 (*)     Sodium 140      Potassium 3.6      Chloride 107      Bicarbonate 22      Anion Gap 15       Urea Nitrogen 16      Creatinine 1.06 (*)     eGFR 57 (*)     Calcium 9.4      Albumin 4.2      Alkaline Phosphatase 69      Total Protein 7.8      AST 33      Bilirubin, Total 0.4      ALT 29     INFLUENZA A AND B PCR - Normal    Flu A Result Not Detected      Flu B Result Not Detected      Narrative:     This assay is an in vitro diagnostic multiplex nucleic acid amplification test for the detection and discrimination of Influenza A & B from nasopharyngeal specimens, and has been validated for use at [facility]. Negative results do not preclude Influenza A/B infections, and should not be used as the sole basis for diagnosis, treatment, or other management decisions. If Influenza A/B and RSV PCR results are negative, testing for Parainfluenza virus, Adenovirus and Metapneumovirus is routinely performed for [facility] pediatric oncology and intensive care inpatients, and is available on other patients by placing an add-on request.   SARS-COV-2 PCR - Normal    Coronavirus 2019, PCR Not Detected      Narrative:     This assay has received FDA Emergency Use Authorization (EUA) and is only authorized for the duration of time that circumstances exist to justify the authorization of the emergency use of in vitro diagnostic tests for the detection of SARS-CoV-2 virus and/or diagnosis of COVID-19 infection under section 564(b)(1) of the Act, 21 U.S.C. 360bbb-3(b)(1). This assay is an in vitro diagnostic nucleic acid amplification test for the qualitative detection of SARS-CoV-2 from nasopharyngeal specimens and has been validated for use at [facility]. Negative results do not preclude COVID-19 infections and should not be used as the sole basis for diagnosis, treatment, or other management decisions.     SERIAL TROPONIN-INITIAL - Normal    Troponin I, High Sensitivity (CMC) 13      Narrative:     Less than 99th percentile of normal range cutoff-  Female and children under 18 years  old <35 ng/L; Male <54 ng/L: Negative  Repeat testing should be performed if clinically indicated.     Female and children under 18 years old  ng/L; Male  ng/L:  Consistent with possible cardiac damage and possible increased clinical   risk. Serial measurements may help to assess extent of myocardial damage.     >120 ng/L: Consistent with cardiac damage, increased clinical risk and  myocardial infarction. Serial measurements may help assess extent of   myocardial damage.      NOTE: Children less than 1 year old may have higher baseline troponin   levels and results should be interpreted in conjunction with the overall   clinical context.    NOTE: Troponin I testing is performed using a different   testing methodology at [facility] than at other   [facility]. Direct result comparisons should only   be made within the same method.     SERIAL TROPONIN, 1 HOUR - Normal    Troponin I, High Sensitivity (CMC) 11      Narrative:     Less than 99th percentile of normal range cutoff-  Female and children under 18 years old <35 ng/L; Male <54 ng/L: Negative  Repeat testing should be performed if clinically indicated.     Female and children under 18 years old  ng/L; Male  ng/L:  Consistent with possible cardiac damage and possible increased clinical   risk. Serial measurements may help to assess extent of myocardial damage.     >120 ng/L: Consistent with cardiac damage, increased clinical risk and  myocardial infarction. Serial measurements may help assess extent of   myocardial damage.      NOTE: Children less than 1 year old may have higher baseline troponin   levels and results should be interpreted in conjunction with the overall   clinical context.    NOTE: Troponin I testing is performed using a different   testing methodology at [facility] than at other   [facility]. Direct result comparisons should only   be made within the same method.     TROPONIN  SERIES- (INITIAL, 1 HR)    Narrative:     The following orders were created for panel order Troponin I Series, High Sensitivity (0, 1 HR).  Procedure                               Abnormality         Status                     ---------                               -----------         ------                     Troponin I, High Sensiti...[[id number]]  Normal              Final result               Troponin, High Sensitivi...[[id number]]  Normal              Final result                 Please view results for these tests on the individual orders.         Past History     Past Medical History:   Diagnosis Date    Cardiomyopathy, unspecified     CHF (congestive heart failure)     CKD (chronic kidney disease)     Encounter for general adult medical examination without abnormal findings 01/31/2022    Encounter for initial preventive physical examination covered by Medicare    Hypertension     Otitis media, unspecified, right ear 01/31/2014    Acute right otitis media    Personal history of other diseases of the digestive system 07/21/2013    History of esophageal reflux    Personal history of other diseases of the musculoskeletal system and connective tissue 07/21/2013    History of low back pain    Personal history of other specified conditions 07/21/2013    History of angioedema     Past Surgical History:   Procedure Laterality Date    BONE GRAFT Left     CARDIAC DEFIBRILLATOR PLACEMENT      COLONOSCOPY  08/23/2013    Colonoscopy (Fiberoptic)    CT ANGIO CORONARY ART WITH HEARTFLOW IF SCORE >30%  11/20/2018    CT HEART CORONARY ANGIOGRAM 11/20/2018 [facility] ANCILLARY LEGACY    CT ANGIO NECK  01/29/2022    CT NECK ANGIO W AND WO IV CONTRAST 1/29/2022 [facility] CLINICAL LEGACY    KNEE SURGERY Left     OTHER SURGICAL HISTORY  02/08/2021    Pacemaker insertion    OTHER SURGICAL HISTORY  11/08/2018    Umbilical hernia repair    OTHER SURGICAL HISTORY  12/05/2013    Wrist Surgery     Social History     Socioeconomic History    Marital  status: Single   Tobacco Use    Smoking status: Former     Current packs/day: 0.00     Types: Cigarettes     Quit date: 1970     Years since quittin.8     Passive exposure: Past    Smokeless tobacco: Never   Vaping Use    Vaping status: Never Used   Substance and Sexual Activity    Alcohol use: Yes     Comment: occasional    Drug use: Never     Social Drivers of Health     Financial Resource Strain: Low Risk  (3/3/2024)    Overall Financial Resource Strain (CARDIA)     Difficulty of Paying Living Expenses: Not hard at all   Transportation Needs: No Transportation Needs (3/3/2024)    PRAPARE - Transportation     Lack of Transportation (Medical): No     Lack of Transportation (Non-Medical): No   Housing Stability: Low Risk  (3/3/2024)    Housing Stability Vital Sign     Unable to Pay for Housing in the Last Year: No     Number of Places Lived in the Last Year: 1     Unstable Housing in the Last Year: No         Medications/Allergies     Previous Medications    ACETAMINOPHEN (TYLENOL) 325 MG TABLET    Take 2 tablets (650 mg) by mouth every 6 hours if needed for mild pain (1 - 3) or fever (temp greater than 38.0 C).    CALCIUM CARBONATE (TUMS) 200 MG CALCIUM CHEWABLE TABLET    Chew 1 tablet (500 mg) 2 times a day.    CHOLECALCIFEROL, VITAMIN D3, 10 MCG (400 UNIT) CAPSULE    TAKE 1 CAPSULE Daily    CICLOPIROX (PENLAC) 8 % SOLUTION    Apply topically once daily at bedtime.    EMPAGLIFLOZIN (JARDIANCE) 10 MG    TAKE 1 TABLET BY MOUTH ONCE DAILY    ESTRADIOL (ESTRACE) 0.01 % (0.1 MG/GRAM) VAGINAL CREAM    Insert 0.5 Applicatorfuls (2 g) into the vagina once daily. Apply to vagina nightly for 1 week then every Monday/Wednesday/Friday.    HYDRALAZINE (APRESOLINE) 10 MG TABLET    TAKE 1 TABLET BY MOUTH EVERY 8 HOURS    HYDROCODONE-ACETAMINOPHEN (NORCO) 5-325 MG TABLET    Take 1 tablet by mouth every 6 hours if needed for severe pain (7 - 10).    ISOSORBIDE DINITRATE (ISORDIL) 20 MG TABLET    TAKE 1 TABLET BY MOUTH THREE  "TIMES A DAY    LIDOCAINE (XYLOCAINE) 5 % CREAM CREAM    Apply to area of pain once daily as needed.    METHOCARBAMOL (ROBAXIN) 500 MG TABLET    Take 1-2 tablets as needed at bedtime for muscle tightness, spasms    METOPROLOL SUCCINATE XL (TOPROL-XL) 25 MG 24 HR TABLET    Take 1 tablet (25 mg) by mouth once daily.    NACL-NAHCO3-HYALURON SOD-ALOE (NASOGEL) 0.9 % SPRAY GEL    USE AS DIRECTED.    ONDANSETRON (ZOFRAN) 4 MG TABLET    Take 1 tablet (4 mg) by mouth every 8 hours if needed for nausea or vomiting.    SPIRONOLACTONE (ALDACTONE) 25 MG TABLET    TAKE 1 TABLET (25 MG) BY MOUTH ONCE DAILY.     Allergies   Allergen Reactions    Cephalexin Unknown    Lisinopril Angioedema         Review of Systems  All systems reviewed and otherwise negative, except as stated above in HPI.      Physical Exam     Visit Vitals  /79 (BP Location: Right arm, Patient Position: Sitting)   Pulse 69   Temp 36.7 °C (98.1 °F) (Oral)   Resp 16   Ht 1.499 m (4' 11\")   Wt 55.3 kg (122 lb)   SpO2 98%   BMI 24.64 kg/m²   OB Status Postmenopausal   Smoking Status Former   BSA 1.52 m²       GENERAL:  Resting comfortably in no acute distress. The patient appears nourished and normally developed. Vital signs as documented.     HEENT:  Head normocephalic, atraumatic. PERRLA. Mucous membranes moist. Postnasal drip present within posterior oropharynx. No erythema, edema, exudate, or evidence of abscess. Nares patent without copious rhinorrhea. No lymphadenopathy.    PULMONARY:  Lungs are clear to auscultation without any respiratory distress. Able to speak clearly in full sentences. No accessory muscle use.    CARDIAC:  Normal rate. No murmurs, rubs or gallops.    CHEST:  No deformity or reproducible TTP of the chest wall.    ABDOMEN:  Soft, non-distended, non-tender. BS positive x 4 quadrants. No rebound, guarding, or peritoneal signs.    MUSCULOSKELETAL:  Able to ambulate. Non edematous with no obvious deformities. Pulses intact distal.    SKIN: "  Warm and dry. Good color with no significant rashes. No pallor, erythema, or edema.    NEURO:  No obvious neurological deficits. Able to follow commands.    Psych:  Appropriate mood and affect.    Consultants  1) N/A      Impression and Plan  In summary, Sandy Smith is admitted to the [facility] Clinical Decision Unit for chest pain. Dr. Plaza is the CDU admission attending.    This patient has been risk-stratified based on available history, physical exam, and related study findings. Admission to the observation status for further diagnosis/treatment/monitoring of symptoms is warranted clinically. This extended period of observation is specifically required to determine the need for hospitalization.     The goals of this admission based on the patient's clinical problem list are:  - CTA in AM  - Continue home medications  - Cardiac/diabetic diet  - Monitor VS    We will observe the patient for the following endpoints:   - Symptomatic improvement  - No acute findings on CTA  - Stable VS    When met, appropriate disposition will be arranged.    Lupe Bloom PA-C   Emergency Medicine/Clinical Decision Unit   [facility]

## 2024-11-10 NOTE — ED TRIAGE NOTES
Tingling in left breast. No pain at this time.  Pt states that she is congested, has had a head cold, and sore throat. Pt states that she may have had a fever but it went away after tylenol

## 2024-11-10 NOTE — ED PROVIDER NOTES
HPI   Chief Complaint   Patient presents with    Sore Throat    Breast Problem       Patient is a 69-year-old female past medical history of cardiomyopathy, hepatitis C, GERD, HLD, HTN, ICD, giant cell arteritis, CHF, T2DM presenting today for flulike symptoms.  Reports that for the past 2 days she has been experiencing nasal congestion, sore throat, postnasal drip, bilateral ear pain/pressure and a clear productive cough.  Denies any shortness of breath.  Reports acid reflux sensation in her throat but not in her chest but does report a history of acid reflux, has not tried any medication at home.  Reports starting yesterday intermittent left-sided breast tingling.  Notes mild pain underneath her left breast where the wire of her bra is but reports a different pain that she describes as a tingling sensation in her left axilla radiating into her left breast.  Denies any rashes to the area.  Has never had pain like this before.              Patient History   Past Medical History:   Diagnosis Date    Cardiomyopathy, unspecified     CHF (congestive heart failure)     CKD (chronic kidney disease)     Encounter for general adult medical examination without abnormal findings 01/31/2022    Encounter for initial preventive physical examination covered by Medicare    Hypertension     Otitis media, unspecified, right ear 01/31/2014    Acute right otitis media    Personal history of other diseases of the digestive system 07/21/2013    History of esophageal reflux    Personal history of other diseases of the musculoskeletal system and connective tissue 07/21/2013    History of low back pain    Personal history of other specified conditions 07/21/2013    History of angioedema     Past Surgical History:   Procedure Laterality Date    BONE GRAFT Left     CARDIAC DEFIBRILLATOR PLACEMENT      COLONOSCOPY  08/23/2013    Colonoscopy (Fiberoptic)    CT ANGIO CORONARY ART WITH HEARTFLOW IF SCORE >30%  11/20/2018    CT HEART CORONARY  ANGIOGRAM 2018 [facility]CILLARY LEGAC[facility]    CT ANGIO NECK  2022    CT NECK ANGIO W AND WO IV CONTRAST 2022 [facility] CLINICAL LEGACY    KNEE SURGERY Left     OTHER SURGICAL HISTORY  2021    Pacemaker insertion    OTHER SURGICAL HISTORY  2018    Umbilical hernia repair    OTHER SURGICAL HISTORY  2013    Wrist Surgery     Family History   Problem Relation Name Age of Onset    Aneurysm Mother      Heart disease Mother      Hyperlipidemia Mother      Hypertension Mother      Heart disease Father      Hypertension Father      Other (cardiac disorder) Sister      Coronary artery disease Sister      Other (cardiac disorder) Brother      Coronary artery disease Brother       Social History     Tobacco Use    Smoking status: Former     Current packs/day: 0.00     Types: Cigarettes     Quit date: 1970     Years since quittin.8     Passive exposure: Past    Smokeless tobacco: Never   Vaping Use    Vaping status: Never Used   Substance Use Topics    Alcohol use: Yes     Comment: occasional    Drug use: Never       Physical Exam   ED Triage Vitals [11/10/24 1500]   Temperature Pulse Respirations BP   36.4 °C (97.5 °F) -- 18 121/72      Pulse Ox Temp Source Heart Rate Source Patient Position   94 % Temporal Monitor Sitting      BP Location FiO2 (%)     Left arm --       Physical Exam  Vitals and nursing note reviewed.   Constitutional:       General: She is not in acute distress.     Appearance: Normal appearance. She is not ill-appearing.   HENT:      Head: Normocephalic and atraumatic.      Right Ear: Hearing, ear canal and external ear normal. Tympanic membrane is bulging. Tympanic membrane is not erythematous.      Left Ear: Hearing, ear canal and external ear normal. Tympanic membrane is bulging. Tympanic membrane is not erythematous.      Nose: Nose normal. No congestion or rhinorrhea.      Right Turbinates: Not swollen or pale.      Left Turbinates: Not swollen or pale.      Right Sinus: No  maxillary sinus tenderness or frontal sinus tenderness.      Left Sinus: No maxillary sinus tenderness or frontal sinus tenderness.      Mouth/Throat:      Mouth: Mucous membranes are moist.      Pharynx: Oropharynx is clear. Uvula midline. No pharyngeal swelling, oropharyngeal exudate, posterior oropharyngeal erythema or uvula swelling.      Tonsils: No tonsillar exudate or tonsillar abscesses. 0 on the right. 0 on the left.   Eyes:      Extraocular Movements: Extraocular movements intact.      Conjunctiva/sclera: Conjunctivae normal.      Pupils: Pupils are equal, round, and reactive to light.   Cardiovascular:      Rate and Rhythm: Normal rate and regular rhythm.      Heart sounds: Normal heart sounds.   Pulmonary:      Effort: No accessory muscle usage or respiratory distress.      Breath sounds: Normal breath sounds. No wheezing, rhonchi or rales.   Abdominal:      General: Abdomen is flat. Bowel sounds are normal. There is no distension.      Palpations: Abdomen is soft.      Tenderness: There is no abdominal tenderness. There is no right CVA tenderness or left CVA tenderness.   Musculoskeletal:         General: No swelling or deformity. Normal range of motion.      Cervical back: Normal range of motion and neck supple.      Right lower leg: No edema.      Left lower leg: No edema.   Skin:     General: Skin is warm and dry.      Capillary Refill: Capillary refill takes less than 2 seconds.      Comments: No rash noted in dermatomal distribution to left chest wall/breast, non tender on palpation of chest wall   Neurological:      General: No focal deficit present.      Mental Status: She is alert and oriented to person, place, and time.      GCS: GCS eye subscore is 4. GCS verbal subscore is 5. GCS motor subscore is 6.      Cranial Nerves: Cranial nerves 2-12 are intact.      Sensory: No sensory deficit.      Motor: Motor function is intact. No weakness.   Psychiatric:         Mood and Affect: Mood and affect  normal.         Speech: Speech normal.         Behavior: Behavior normal. Behavior is cooperative.           ED Course & MDM   ED Course as of 11/10/24 1737   Sun Nov 10, 2024   1648 ECG 12 lead  Atrial paced rhythm.  64 bpm.  OH interval 158.  QTc 435.  New T wave inversions in leads II, 3, aVF and leads V4 through V6 which are new as compared to previous EKG.  No ST elevation. [ML]      ED Course User Index  [ML] Montse Levin PA-C         Diagnoses as of 11/10/24 1737   Chest pain, unspecified type   Viral URI with cough                 No data recorded     Moss Coma Scale Score: 15 (11/10/24 1503 : Harvinder Palmer, TRUNG)                           Medical Decision Making  69-year-old male presenting today for flulike symptoms and left-sided chest tingling sensation.  On my exam of HEENT, TMs erythema though mildly bulging likely from congestion.  Oropharynx is without erythema, tonsillar swelling or exudates.  Lungs clear auscultation without any adventitious lung sounds.  Vital signs stable, she is afebrile and not hypoxic.  Denies shortness of breath.  Endorses breast tingling starting in her axilla radiating forward to her nipple line.  No rash noted, nontender on exam, no erythema or swelling to the breast area.  Lower suspicion for shingles.  Considering left-sided symptoms, will get ACS workup.  Will also obtain COVID and flu swabs.  Discussed antihistamine and nasal steroids for symptoms of a viral URI.    COVID and flu are negative.  Chest x-ray negative.  CBC CMP within normal limits.  2 troponins are negative.  Does have new T wave inversions in her EKG as compared to previous EKG.  Discussed EKG findings with left-sided chest symptoms and offered admission versus discharge home with close follow-up with primary care for stress testing.  Patient opted to be admitted to the CDU for further stress testing and obtain a coronary CTA.    Case discussed with the ED attending   Bola        Procedure  Procedures     Montse Levin PA-C  11/10/24 7891

## 2024-11-11 ENCOUNTER — APPOINTMENT (OUTPATIENT)
Dept: RADIOLOGY | Facility: HOSPITAL | Age: 69
End: 2024-11-11
Payer: COMMERCIAL

## 2024-11-11 ENCOUNTER — PHARMACY VISIT (OUTPATIENT)
Dept: PHARMACY | Facility: CLINIC | Age: 69
End: 2024-11-11
Payer: COMMERCIAL

## 2024-11-11 VITALS
HEART RATE: 61 BPM | BODY MASS INDEX: 24.6 KG/M2 | RESPIRATION RATE: 16 BRPM | SYSTOLIC BLOOD PRESSURE: 111 MMHG | TEMPERATURE: 98.1 F | OXYGEN SATURATION: 97 % | DIASTOLIC BLOOD PRESSURE: 66 MMHG | HEIGHT: 59 IN | WEIGHT: 122 LBS

## 2024-11-11 LAB
ATRIAL RATE: 64 BPM
P AXIS: 43 DEGREES
P OFFSET: 208 MS
P ONSET: 149 MS
PR INTERVAL: 158 MS
Q ONSET: 227 MS
QRS COUNT: 11 BEATS
QRS DURATION: 96 MS
QT INTERVAL: 422 MS
QTC CALCULATION(BAZETT): 435 MS
QTC FREDERICIA: 431 MS
R AXIS: 14 DEGREES
T AXIS: -18 DEGREES
T OFFSET: 438 MS
VENTRICULAR RATE: 64 BPM

## 2024-11-11 PROCEDURE — RXMED WILLOW AMBULATORY MEDICATION CHARGE

## 2024-11-11 PROCEDURE — 2500000002 HC RX 250 W HCPCS SELF ADMINISTERED DRUGS (ALT 637 FOR MEDICARE OP, ALT 636 FOR OP/ED)

## 2024-11-11 PROCEDURE — 2550000001 HC RX 255 CONTRASTS

## 2024-11-11 PROCEDURE — 2500000004 HC RX 250 GENERAL PHARMACY W/ HCPCS (ALT 636 FOR OP/ED)

## 2024-11-11 PROCEDURE — 96374 THER/PROPH/DIAG INJ IV PUSH: CPT | Mod: 59

## 2024-11-11 PROCEDURE — 2500000001 HC RX 250 WO HCPCS SELF ADMINISTERED DRUGS (ALT 637 FOR MEDICARE OP)

## 2024-11-11 PROCEDURE — 75574 CT ANGIO HRT W/3D IMAGE: CPT | Performed by: INTERNAL MEDICINE

## 2024-11-11 PROCEDURE — 75574 CT ANGIO HRT W/3D IMAGE: CPT

## 2024-11-11 PROCEDURE — G0378 HOSPITAL OBSERVATION PER HR: HCPCS

## 2024-11-11 RX ORDER — BENZONATATE 100 MG/1
100 CAPSULE ORAL 3 TIMES DAILY PRN
Status: DISCONTINUED | OUTPATIENT
Start: 2024-11-11 | End: 2024-11-11 | Stop reason: HOSPADM

## 2024-11-11 RX ORDER — ASPIRIN 81 MG/1
81 TABLET ORAL DAILY
Qty: 30 TABLET | Refills: 11 | Status: SHIPPED | OUTPATIENT
Start: 2024-11-11 | End: 2025-11-11

## 2024-11-11 RX ORDER — ATORVASTATIN CALCIUM 40 MG/1
40 TABLET, FILM COATED ORAL DAILY
Qty: 30 TABLET | Refills: 0 | Status: SHIPPED | OUTPATIENT
Start: 2024-11-11 | End: 2024-12-11

## 2024-11-11 ASSESSMENT — PAIN - FUNCTIONAL ASSESSMENT: PAIN_FUNCTIONAL_ASSESSMENT: 0-10

## 2024-11-11 NOTE — DISCHARGE SUMMARY
Disposition Note  [facility] CLINICAL DECISION  Patient: Sandy Smith  MRN: [id number]  : 1955  Date of Evaluation: 11/10/2024  ED Provider: ARIELA Lester DNP      Limitations to history: none  Independent Historian: yes  External Records Reviewed: EMR       Subjective:    Sandy Smith is a 69 y.o. female has undergone comprehensive diagnostic evaluation and therapeutic management in accordance with the CDU guidelines for chest pain. Based on the patient's  clinical response and diagnostic information during this period of observation, it has been determined that the patient will be discharged.     The acute evaluation included:  Orders Placed This Encounter   Procedures    XR chest 2 views    CT angio coronary art with heartflow if score >30%    CBC and Auto Differential    Comprehensive metabolic panel    Troponin I Series, High Sensitivity (0, 1 HR)    Influenza A, and B PCR    Sars-CoV-2 PCR    Troponin I, High Sensitivity, Initial    Troponin, High Sensitivity, 1 Hour    Adult diet Regular    Vital signs    Vital Signs    Notify provider    Nursing communication for Metoprolol/Labetalol dosing    Initiate droplet plus isolation    ECG 12 lead    Insert and maintain peripheral IV    Saline lock IV    Initiate Observation Send to CDU    Initiate observation status         Placed in observation at: 11/10       Past History     Past Medical History:   Diagnosis Date    Cardiomyopathy, unspecified     CHF (congestive heart failure)     CKD (chronic kidney disease)     Encounter for general adult medical examination without abnormal findings 2022    Encounter for initial preventive physical examination covered by Medicare    Hypertension     Otitis media, unspecified, right ear 2014    Acute right otitis media    Personal history of other diseases of the digestive system 2013    History of esophageal reflux    Personal history of other diseases of the  musculoskeletal system and connective tissue 2013    History of low back pain    Personal history of other specified conditions 2013    History of angioedema     Past Surgical History:   Procedure Laterality Date    BONE GRAFT Left     CARDIAC DEFIBRILLATOR PLACEMENT      COLONOSCOPY  2013    Colonoscopy (Fiberoptic)    CT ANGIO CORONARY ART WITH HEARTFLOW IF SCORE >30%  2018    CT HEART CORONARY ANGIOGRAM 2018 [facility] ANCILLARY LEGACY    CT ANGIO NECK  2022    CT NECK ANGIO W AND WO IV CONTRAST 2022 [facility] CLINICAL LEGACY    KNEE SURGERY Left     OTHER SURGICAL HISTORY  2021    Pacemaker insertion    OTHER SURGICAL HISTORY  2018    Umbilical hernia repair    OTHER SURGICAL HISTORY  2013    Wrist Surgery     Social History     Socioeconomic History    Marital status: Single   Tobacco Use    Smoking status: Former     Current packs/day: 0.00     Types: Cigarettes     Quit date: 1970     Years since quittin.8     Passive exposure: Past    Smokeless tobacco: Never   Vaping Use    Vaping status: Never Used   Substance and Sexual Activity    Alcohol use: Yes     Comment: occasional    Drug use: Never     Social Drivers of Health     Financial Resource Strain: Low Risk  (3/3/2024)    Overall Financial Resource Strain (CARDIA)     Difficulty of Paying Living Expenses: Not hard at all   Transportation Needs: No Transportation Needs (3/3/2024)    PRAPARE - Transportation     Lack of Transportation (Medical): No     Lack of Transportation (Non-Medical): No   Housing Stability: Low Risk  (3/3/2024)    Housing Stability Vital Sign     Unable to Pay for Housing in the Last Year: No     Number of Places Lived in the Last Year: 1     Unstable Housing in the Last Year: No         Medications/Allergies     Previous Medications    ACETAMINOPHEN (TYLENOL) 325 MG TABLET    Take 2 tablets (650 mg) by mouth every 6 hours if needed for mild pain (1 - 3) or fever (temp greater than  38.0 C).    CALCIUM CARBONATE (TUMS) 200 MG CALCIUM CHEWABLE TABLET    Chew 1 tablet (500 mg) 2 times a day.    CHOLECALCIFEROL, VITAMIN D3, 10 MCG (400 UNIT) CAPSULE    TAKE 1 CAPSULE Daily    CICLOPIROX (PENLAC) 8 % SOLUTION    Apply topically once daily at bedtime.    EMPAGLIFLOZIN (JARDIANCE) 10 MG    TAKE 1 TABLET BY MOUTH ONCE DAILY    ESTRADIOL (ESTRACE) 0.01 % (0.1 MG/GRAM) VAGINAL CREAM    Insert 0.5 Applicatorfuls (2 g) into the vagina once daily. Apply to vagina nightly for 1 week then every Monday/Wednesday/Friday.    HYDRALAZINE (APRESOLINE) 10 MG TABLET    TAKE 1 TABLET BY MOUTH EVERY 8 HOURS    HYDROCODONE-ACETAMINOPHEN (NORCO) 5-325 MG TABLET    Take 1 tablet by mouth every 6 hours if needed for severe pain (7 - 10).    ISOSORBIDE DINITRATE (ISORDIL) 20 MG TABLET    TAKE 1 TABLET BY MOUTH THREE TIMES A DAY    LIDOCAINE (XYLOCAINE) 5 % CREAM CREAM    Apply to area of pain once daily as needed.    METHOCARBAMOL (ROBAXIN) 500 MG TABLET    Take 1-2 tablets as needed at bedtime for muscle tightness, spasms    METOPROLOL SUCCINATE XL (TOPROL-XL) 25 MG 24 HR TABLET    Take 1 tablet (25 mg) by mouth once daily.    NACL-NAHCO3-HYALURON SOD-ALOE (NASOGEL) 0.9 % SPRAY GEL    USE AS DIRECTED.    ONDANSETRON (ZOFRAN) 4 MG TABLET    Take 1 tablet (4 mg) by mouth every 8 hours if needed for nausea or vomiting.    SPIRONOLACTONE (ALDACTONE) 25 MG TABLET    TAKE 1 TABLET (25 MG) BY MOUTH ONCE DAILY.     Allergies   Allergen Reactions    Cephalexin Unknown    Lisinopril Angioedema         Review of Systems  All systems reviewed and otherwise negative, except as stated above in HPI.    Diagnostics reviewed by Aishwarya Rush, APRN-CNP, DNP     Labs:  Results for orders placed or performed during the hospital encounter of 11/10/24   CBC and Auto Differential    Collection Time: 11/10/24  3:37 PM   Result Value Ref Range    WBC 7.8 4.4 - 11.3 x10*3/uL    nRBC 0.0 0.0 - 0.0 /100 WBCs    RBC 5.53 (H) 4.00 - 5.20 x10*6/uL     Hemoglobin 15.6 12.0 - 16.0 g/dL    Hematocrit 44.7 36.0 - 46.0 %    MCV 81 80 - 100 fL    MCH 28.2 26.0 - 34.0 pg    MCHC 34.9 32.0 - 36.0 g/dL    RDW 13.6 11.5 - 14.5 %    Platelets 264 150 - 450 x10*3/uL    Neutrophils % 58.1 40.0 - 80.0 %    Immature Granulocytes %, Automated 0.4 0.0 - 0.9 %    Lymphocytes % 30.7 13.0 - 44.0 %    Monocytes % 7.1 2.0 - 10.0 %    Eosinophils % 3.3 0.0 - 6.0 %    Basophils % 0.4 0.0 - 2.0 %    Neutrophils Absolute 4.53 1.20 - 7.70 x10*3/uL    Immature Granulocytes Absolute, Automated 0.03 0.00 - 0.70 x10*3/uL    Lymphocytes Absolute 2.39 1.20 - 4.80 x10*3/uL    Monocytes Absolute 0.55 0.10 - 1.00 x10*3/uL    Eosinophils Absolute 0.26 0.00 - 0.70 x10*3/uL    Basophils Absolute 0.03 0.00 - 0.10 x10*3/uL   Comprehensive metabolic panel    Collection Time: 11/10/24  3:37 PM   Result Value Ref Range    Glucose 115 (H) 74 - 99 mg/dL    Sodium 140 136 - 145 mmol/L    Potassium 3.6 3.5 - 5.3 mmol/L    Chloride 107 98 - 107 mmol/L    Bicarbonate 22 21 - 32 mmol/L    Anion Gap 15 10 - 20 mmol/L    Urea Nitrogen 16 6 - 23 mg/dL    Creatinine 1.06 (H) 0.50 - 1.05 mg/dL    eGFR 57 (L) >60 mL/min/1.73m*2    Calcium 9.4 8.6 - 10.6 mg/dL    Albumin 4.2 3.4 - 5.0 g/dL    Alkaline Phosphatase 69 33 - 136 U/L    Total Protein 7.8 6.4 - 8.2 g/dL    AST 33 9 - 39 U/L    Bilirubin, Total 0.4 0.0 - 1.2 mg/dL    ALT 29 7 - 45 U/L   Influenza A, and B PCR    Collection Time: 11/10/24  3:37 PM   Result Value Ref Range    Flu A Result Not Detected Not Detected    Flu B Result Not Detected Not Detected   Sars-CoV-2 PCR    Collection Time: 11/10/24  3:37 PM   Result Value Ref Range    Coronavirus 2019, PCR Not Detected Not Detected   Troponin I, High Sensitivity, Initial    Collection Time: 11/10/24  3:37 PM   Result Value Ref Range    Troponin I, High Sensitivity ([facility]) 13 0 - 34 ng/L   Troponin, High Sensitivity, 1 Hour    Collection Time: 11/10/24  4:41 PM   Result Value Ref Range    Troponin I, High  "Sensitivity ([facility]) 11 0 - 34 ng/L     *Note: Due to a large number of results and/or encounters for the requested time period, some results have not been displayed. A complete set of results can be found in Results Review.     Radiographs:  CT angio coronary art with heartflow if score >30%   Final Result   1. Nonobstructive coronary artery disease.   2. The proximal LAD has focal calcified atherosclerosis with minimal   stenosis (<25%)   3. The LCx and RCA have no significant atherosclerotic change or   stenotic disease.   4. Right-dominant coronary system   5. Coronary artery calcium score 13.   6. Dilated main pulmonary artery (3.1 cm). Correlate/concern for   elevated pulmonary pressures.             MACRO:   None        Signed by: Vivian Godwin 11/11/2024 10:11 AM   Dictation workstation:   [id number]      XR chest 2 views   Final Result   No acute thoracic findings. Nodular soft tissue thickening noted about   the lower neck and supraclavicular regions bilaterally.   Signed by Jose Gonzalez II, MD              Physical Exam     Visit Vitals  /66   Pulse 61   Temp 36.7 °C (98.1 °F) (Oral)   Resp 16   Ht 1.499 m (4' 11\")   Wt 55.3 kg (122 lb)   SpO2 97%   BMI 24.64 kg/m²   OB Status Postmenopausal   Smoking Status Former   BSA 1.52 m²       GENERAL:  The patient appears nourished and normally developed. Vital signs as documented.     HEENT:  Head normocephalic, atraumatic, EOMs intact, PERRLA, Mucous membranes moist. Nares patent without copious rhinorrhea.  No lymphadenopathy.    PULMONARY:  Lungs are clear to auscultation, without any respiratory distress. Able to speak full sentences, no accessory muscle use    CARDIAC:   Normal rate. No murmurs, rubs or gallops    ABDOMEN:  Soft, non-distended, non-tender, BS positive x 4 quadrants, No rebound or guarding, no peritoneal signs, no CVA tenderness, no masses or organomegaly    MUSCULOSKELETAL:   Able to ambulate, Non edematous, with no obvious " deformities. Pulses intact distal    SKIN:   Good color, with no significant rashes.  No pallor.    NEURO:  No obvious neurological deficits, normal sensation and strength bilaterally.  Able to follow commands, NIH 0, CN 2-12 intact.    Psych: Appropriate mood and affect    Consultants  1) none       Impression and Plan    In summary, Sandy Smith has been cared for according to the standard [facility] Clinical Decision Unit observation protocol for Chest pain. This extended period of observation was specifically required to determine the need for hospitalization. Prior to discharge from observation, the final physical exam is documented above.     Significant events during the course of observation based on the goals of the clinical problem list include:   1) Chest pain   Patient had a coronary CTA done this morning which showed non-obstructive CAD. I spoke to her regarding prevention and taking a baby ASA and atorvastatin a day. She is not so sure about taking statin and would like to discuss this further with her primary care in Jan but would like the prescription just in case.       Based on the patient's condition and test results, the patient will be discharged.     Total length of observation was 1832 hours. Dr. York  is the CDU disposition attending.      Discharge Diagnosis  Chest pain    Issues Requiring Follow-Up  Chest pain  Prevention     Discharge Meds     Your medication list        START taking these medications        Instructions Last Dose Given Next Dose Due   aspirin 81 mg EC tablet      Take 1 tablet (81 mg) by mouth once daily.       atorvastatin 40 mg tablet  Commonly known as: Lipitor      Take 1 tablet (40 mg) by mouth once daily.              ASK your doctor about these medications        Instructions Last Dose Given Next Dose Due   acetaminophen 325 mg tablet  Commonly known as: TylenoL      Take 2 tablets (650 mg) by mouth every 6 hours if needed for mild  pain (1 - 3) or fever (temp greater than 38.0 C).       Antacid (calcium carbonate) 200 mg calcium (500 mg) chewable tablet  Generic drug: calcium carbonate      Chew 1 tablet (500 mg) 2 times a day.       cholecalciferol 400 unit capsule  Commonly known as: Vitamin D-3           ciclopirox 8 % solution  Commonly known as: Penlac      Apply topically once daily at bedtime.       estradiol 0.01 % (0.1 mg/gram) vaginal cream  Commonly known as: Estrace      Insert 0.5 Applicatorfuls (2 g) into the vagina once daily. Apply to vagina nightly for 1 week then every Monday/Wednesday/Friday.       hydrALAZINE 10 mg tablet  Commonly known as: Apresoline      TAKE 1 TABLET BY MOUTH EVERY 8 HOURS       HYDROcodone-acetaminophen 5-325 mg tablet  Commonly known as: Norco      Take 1 tablet by mouth every 6 hours if needed for severe pain (7 - 10).       isosorbide dinitrate 20 mg tablet  Commonly known as: Isordil      TAKE 1 TABLET BY MOUTH THREE TIMES A DAY       Jardiance 10 mg  Generic drug: empagliflozin      TAKE 1 TABLET BY MOUTH ONCE DAILY       lidocaine 5 % cream cream  Commonly known as: Xylocaine           methocarbamol 500 mg tablet  Commonly known as: Robaxin           metoprolol succinate XL 25 mg 24 hr tablet  Commonly known as: Toprol-XL      Take 1 tablet (25 mg) by mouth once daily.       NasogeL 0.9 % spray gel  Generic drug: NaCl-NaHCO3-hyaluron sod-aloe           ondansetron 4 mg tablet  Commonly known as: Zofran      Take 1 tablet (4 mg) by mouth every 8 hours if needed for nausea or vomiting.       spironolactone 25 mg tablet  Commonly known as: Aldactone      TAKE 1 TABLET (25 MG) BY MOUTH ONCE DAILY.                 Where to Get Your Medications        You can get these medications from any pharmacy    Bring a paper prescription for each of these medications  aspirin 81 mg EC tablet  atorvastatin 40 mg tablet         Test Results Pending At Discharge  Pending Labs       No current pending labs.             CDU COURSE:  The patient was admitted to the CDU for cardiac work up. She had a coronary CTA done this morning which showed non-obstructive CAD with a calcium score of 13. I reviewed the results with her and the importance of prevention at this time. WE spoke about statins and Aspirin. She is not so sure about taking statins but would like the prescription and will discuss this with her primary care.   She thinks she will take ASA daily.   Remained hemodynamically stable.     Outpatient Follow-Up  Future Appointments   Date Time Provider Department Center   12/12/2024  9:30 AM Olga Newsome MD [id number] [facility]   1/6/2025  1:00 PM Lupillo Haddad MD [id number] [facility]   7/31/2025  8:45 AM Joaquín Urbina OD [id number] [facility]         DERRICK Lester-CNP, DNP

## 2024-11-13 ENCOUNTER — TELEPHONE (OUTPATIENT)
Dept: CARDIOLOGY | Facility: HOSPITAL | Age: 69
End: 2024-11-13
Payer: COMMERCIAL

## 2024-11-21 PROCEDURE — RXMED WILLOW AMBULATORY MEDICATION CHARGE

## 2024-11-23 ENCOUNTER — PHARMACY VISIT (OUTPATIENT)
Dept: PHARMACY | Facility: CLINIC | Age: 69
End: 2024-11-23
Payer: COMMERCIAL

## 2024-11-26 ENCOUNTER — APPOINTMENT (OUTPATIENT)
Dept: CARDIOLOGY | Facility: CLINIC | Age: 69
End: 2024-11-26
Payer: COMMERCIAL

## 2024-11-26 VITALS
HEIGHT: 59 IN | SYSTOLIC BLOOD PRESSURE: 96 MMHG | DIASTOLIC BLOOD PRESSURE: 58 MMHG | HEART RATE: 75 BPM | BODY MASS INDEX: 25.2 KG/M2 | WEIGHT: 125 LBS | OXYGEN SATURATION: 96 %

## 2024-11-26 DIAGNOSIS — I42.0 DILATED CARDIOMYOPATHY (MULTI): Primary | ICD-10-CM

## 2024-11-26 PROCEDURE — 3008F BODY MASS INDEX DOCD: CPT | Performed by: INTERNAL MEDICINE

## 2024-11-26 PROCEDURE — 1160F RVW MEDS BY RX/DR IN RCRD: CPT | Performed by: INTERNAL MEDICINE

## 2024-11-26 PROCEDURE — 99214 OFFICE O/P EST MOD 30 MIN: CPT | Performed by: INTERNAL MEDICINE

## 2024-11-26 PROCEDURE — 3074F SYST BP LT 130 MM HG: CPT | Performed by: INTERNAL MEDICINE

## 2024-11-26 PROCEDURE — 1159F MED LIST DOCD IN RCRD: CPT | Performed by: INTERNAL MEDICINE

## 2024-11-26 PROCEDURE — 3078F DIAST BP <80 MM HG: CPT | Performed by: INTERNAL MEDICINE

## 2024-11-26 PROCEDURE — 1125F AMNT PAIN NOTED PAIN PRSNT: CPT | Performed by: INTERNAL MEDICINE

## 2024-11-26 PROCEDURE — 1036F TOBACCO NON-USER: CPT | Performed by: INTERNAL MEDICINE

## 2024-11-26 ASSESSMENT — ENCOUNTER SYMPTOMS: OCCASIONAL FEELINGS OF UNSTEADINESS: 0

## 2024-11-26 ASSESSMENT — COLUMBIA-SUICIDE SEVERITY RATING SCALE - C-SSRS
2. HAVE YOU ACTUALLY HAD ANY THOUGHTS OF KILLING YOURSELF?: NO
1. IN THE PAST MONTH, HAVE YOU WISHED YOU WERE DEAD OR WISHED YOU COULD GO TO SLEEP AND NOT WAKE UP?: NO
6. HAVE YOU EVER DONE ANYTHING, STARTED TO DO ANYTHING, OR PREPARED TO DO ANYTHING TO END YOUR LIFE?: NO

## 2024-11-26 ASSESSMENT — PAIN SCALES - GENERAL: PAINLEVEL_OUTOF10: 2

## 2024-11-26 NOTE — PROGRESS NOTES
Primary Care Physician: Olga Newsome MD  Date of Visit: 11/26/2024  9:40 AM EST  Location of visit: [facility] [place] [address]     Chief Complaint:   No chief complaint on file.       HPI / Summary:   Sandy Smith is a 69 y.o. female presents for followup.       July 8 last office visit.  Cardiomyopathy related to few PVCs in 2020 underwent RFA placed on Amio eventually ICD implanted low normal EF.  Her last echocardiogram was in August 2023 prior echo in 2020 the estimated EF was 25 to 30%.  She was admitted on November 10 for chest pain I believe she was in the clinical decision unit and reviewing the discharge summary calcium score was 13 coronary CT angiogram showed nonobstructive CAD dilated main PA I believe she is being seen in post hospital follow-up.  Non exertional left upper chest into axilla.,extended into axilla. Began after reaching into a tight space.  Lasts about 10 minutes.    4 episodes in last few days.   No activity limitation.  No DAVIS.  No redness, fever, drainage in pocket.  Normal wbc count.    Specialty Problems          Cardiology Problems    Bradycardia    Cardiomyopathy    Hyperlipidemia    Hypertension    ICD (implantable cardioverter-defibrillator) in place    Non-sustained ventricular tachycardia (Multi)    Chest pain    CHF (congestive heart failure)        Past Medical History:   Diagnosis Date    Cardiomyopathy, unspecified     CHF (congestive heart failure)     CKD (chronic kidney disease)     Encounter for general adult medical examination without abnormal findings 01/31/2022    Encounter for initial preventive physical examination covered by Medicare    Hypertension     Otitis media, unspecified, right ear 01/31/2014    Acute right otitis media    Personal history of other diseases of the digestive system 07/21/2013    History of esophageal reflux    Personal history of other diseases of the musculoskeletal system and connective tissue 07/21/2013    History of low back pain     Personal history of other specified conditions 07/21/2013    History of angioedema          Past Surgical History:   Procedure Laterality Date    BONE GRAFT Left     CARDIAC DEFIBRILLATOR PLACEMENT      COLONOSCOPY  08/23/2013    Colonoscopy (Fiberoptic)    CT ANGIO CORONARY ART WITH HEARTFLOW IF SCORE >30%  11/20/2018    CT HEART [place]ON[place] [facility] [place]    CT ANGIO NECK  01/29/2022    CT NECK ANGIO W AND WO IV CONTRAST 1/29/2022 [facility] CLINICAL LEGACY    KNEE SURGERY Left     OTHER SURGICAL HISTORY  02/08/2021    Pacemaker insertion    OTHER SURGICAL HISTORY  11/08/2018    Umbilical hernia repair    OTHER SURGICAL HISTORY  12/05/2013    Wrist Surgery          Social History:   reports that she quit smoking about 54 years ago. Her smoking use included cigarettes. She has been exposed to tobacco smoke. She has never used smokeless tobacco. She reports current alcohol use. She reports that she does not use drugs.      Allergies:  Allergies   Allergen Reactions    Cephalexin Unknown    Lisinopril Angioedema       Outpatient Medications:  Current Outpatient Medications   Medication Instructions    acetaminophen (TYLENOL) 650 mg, oral, Every 6 hours PRN    Antacid (calcium carbonate) 500 mg, oral, 2 times daily    aspirin 81 mg, oral, Daily    atorvastatin (LIPITOR) 40 mg, oral, Daily    cholecalciferol, vitamin D3, 10 mcg (400 unit) capsule TAKE 1 CAPSULE Daily    ciclopirox (Penlac) 8 % solution Topical, Nightly    empagliflozin (Jardiance) 10 mg TAKE 1 TABLET BY MOUTH ONCE DAILY    estradiol (ESTRACE) 2 g, vaginal, Daily, Apply to vagina nightly for 1 week then every Monday/Wednesday/Friday.    hydrALAZINE (Apresoline) 10 mg tablet TAKE 1 TABLET BY MOUTH EVERY 8 HOURS    HYDROcodone-acetaminophen (Norco) 5-325 mg tablet 1 tablet, oral, Every 6 hours PRN    isosorbide dinitrate (Isordil) 20 mg tablet TAKE 1 TABLET BY MOUTH THREE TIMES A DAY    lidocaine (Xylocaine) 5 % cream cream Apply to  "area of pain once daily as needed.    methocarbamol (Robaxin) 500 mg tablet Take 1-2 tablets as needed at bedtime for muscle tightness, spasms    metoprolol succinate XL (TOPROL-XL) 25 mg, oral, Daily    NaCl-NaHCO3-hyaluron sod-aloe (NasogeL) 0.9 % spray gel USE AS DIRECTED.    ondansetron (ZOFRAN) 4 mg, oral, Every 8 hours PRN    spironolactone (Aldactone) 25 mg tablet TAKE 1 TABLET (25 MG) BY MOUTH ONCE DAILY.       ROS     Physical Exam:  Vitals:    11/26/24 0946   BP: 96/58   BP Location: Right arm   Patient Position: Sitting   Pulse: 75   SpO2: 96%   Weight: 56.7 kg (125 lb)   Height: 1.499 m (4' 11\")     Wt Readings from Last 5 Encounters:   11/26/24 56.7 kg (125 lb)   11/10/24 55.3 kg (122 lb)   10/07/24 55.3 kg (122 lb)   07/08/24 55.4 kg (122 lb 1.6 oz)   07/01/24 55.3 kg (121 lb 14.6 oz)     Body mass index is 25.25 kg/m².   Anxious appearing well-developed female.  Flat JVP.  Regular rhythm.  Clear lungs.  Occasional skipped beat.  ICD seems to be sitting a little low in the pocket a bit lateral.  Pocket itself does not seem to have fluid.  No redness.  Minimal palpitation related tenderness.  .  Normal range of motion in her arm.  No left arm swelling.  No dependent edema soft abdomen clear lungs  Last Labs:  CMP:  Recent Labs     11/10/24  1537 03/03/24  0018 01/15/24  1606 10/17/23  2203 04/08/23  1529    140 143 142 142   K 3.6 4.1 4.0 5.1 4.7    103 108* 105 110*   CO2 22 27 26 27 26   ANIONGAP 15 14 13 15 11   BUN 16 14 15 15 13   CREATININE 1.06* 1.11* 1.06* 1.17* 1.03   EGFR 57* 54* 57* 51*  --    GLUCOSE 115* 91 81 81 94     Recent Labs     11/10/24  1537 03/03/24  0018 10/17/23  2203 07/19/23  1400 04/08/23  1529 03/21/23  1031   ALBUMIN 4.2 4.6 4.3 4.0 3.5 4.3   ALKPHOS 69 84  --  60 51 69   ALT 29 35  --  37 40 52*   AST 33 36  --  33 36 45*   BILITOT 0.4 0.4  --  0.5 0.5 0.4   LIPASE  --  35  --   --   --   --      CBC:  Recent Labs     11/10/24  1537 03/03/24  0018 " 01/15/24  1606 10/17/23  2203 07/19/23  1400   WBC 7.8 7.2 7.4 7.1 5.6   HGB 15.6 16.3* 14.2 14.9 14.0   HCT 44.7 48.7* 45.5 45.6 45.1    274 244 221 238   MCV 81 84 88 85 90     COAG:   Recent Labs     02/02/22  1141 01/29/22  0855 01/26/21  1009 01/16/21  0651 10/25/20  0712   INR 0.9 0.9 1.0 0.9 1.0     HEME/ENDO:  Recent Labs     09/11/23  1729 07/21/23  1127 01/28/22  1147 06/07/21  1118 10/24/20  0330 09/21/20  0641 09/17/20  0634 06/04/18  1558   TSH  --  6.54* 6.56* 6.83* 2.08   < >  --  2.02   HGBA1C 5.4  --  5.8*  --   --   --  5.4 5.4    < > = values in this interval not displayed.      CARDIAC:   Recent Labs     11/10/24  1641 11/10/24  1537 03/03/24  0154 03/03/24  0018 01/15/24  1606 04/08/23  1529 07/20/22  2120 10/21/20  1825 09/20/20  2210   TROPHS 11 13 9 13  --  14   < >  --   --    BNP  --   --   --  48 66 79  --  910* 356*    < > = values in this interval not displayed.     Recent Labs     07/21/23  1127 09/17/20  0634 06/15/18  1302   CHOL 191 189 172   LDLF 97 104* 92   HDL 74.4 68.1 62.9   TRIG 98 84 88       Last Cardiology Tests:  ECG:      Echo:  Echo Results:  No results found for this or any previous visit from the past 3650 days.       Cath:      Stress Test:  Stress Results:  No results found for this or any previous visit from the past 365 days.         Cardiac Imaging:  ECG 12 lead  Atrial-paced rhythm with occasional supraventricular complexes and Premature supraventricular complexes  T wave abnormality, consider inferolateral ischemia  Abnormal ECG  When compared with ECG of 03-MAR-2024 01:40,  Electronic atrial pacemaker has replaced Sinus rhythm  Inverted T waves have replaced nonspecific T wave abnormality in Inferior leads  T wave inversion now evident in Lateral leads    See ED provider note for full interpretation and clinical correlation  Confirmed by Yadi Bojorquez ([id number]) on 11/11/2024 7:06:26 PM  CT angio coronary art with heartflow if score >30%  Narrative:  Interpreted By:  Vivian Godwin and Amoah Joseph   STUDY:  CT ANGIO CORONARY ART WITH HEARTFLOW IF SCORE >30%;  11/11/2024 9:17  am      INDICATION:  Signs/Symptoms:left sided pain/tingling under breast.          COMPARISON:  None.      ACCESSION NUMBER(S):  [id number]      ORDERING CLINICIAN:  DONTE FALCON      TECHNIQUE:  Using multi-detector CT technology,  axial, sequential imaging with  prospective gating was performed of the chest following the  intravenous administration of 90 ML Omnipaque 350.  In addition,  CT-FFR analysis was also performed.      The patient was premedicated with  10 mg i.v metoprolol and 0.8 Mg  sublingual nitroglycerin for heart rate control and coronary  dilation, respectively.      For optimization of anatomic evaluation, multiplanar reconstruction,  maximum intensity projections, and advanced 3-D off-line  postprocessing were performed on a dedicated stand-alone workstation  under the direct supervision of the interpreting physician.      FINDINGS:  POTENTIAL STUDY LIMITATIONS:  Motion artifact.      CORONARY ARTERIES:  Coronary artery calcium score:  LM: 0  LAD: 13.3  LCX: 0  RCA:0  Total: 13.3      CORONARY ANATOMY:  There is normal origin of the coronary arteries.      LEFT MAIN CORONARY ARTERY:  The left main is normal sized vessel that  bifurcates into the LAD  and circumflex. There is no significant atherosclerotic change or  stenotic disease.      LEFT ANTERIOR DESCENDING ARTERY:  The LAD is a normal size vessel that  wraps around the apex.  It gives rise to  1 acute diagonal branches.  Focal calcified atherosclerosis at the proximal LAD with estimated  luminal stenosis of less than 25%      LEFT CIRCUMFLEX ARTERY:  The LCX is a normal size vessel, which is  non-dominant.  It gives rise to  2 obtuse marginal branches.  There is no significant atherosclerotic change or stenotic disease.      RIGHT CORONARY ARTERY:  The RCA is a normal size vessel, which is  dominant .  It  gives rise to a  conus branch,  janel branch, and  2 acute  marginal branches.  In its distal segment it bifurcates into the PDA  and PV branch. There is no significant atherosclerotic change or  stenotic disease.      CARDIAC CHAMBERS:  The cardiac chambers demonstrate normal atrioventricular and  ventriculoarterial concordance, and systemic and pulmonary venous  return.      LEFT ATRIUM:  Normal size ( 20 - cm2)      RIGHT ATRIUM:  Normal size ( 20 - cm2), CIED lead in situ.      INTERATRIAL SEPTUM:  Intact.      LEFT VENTRICLE:  Normal size (4.8 - cm)      RIGHT VENTRICLE:  Normal size (4 - cm), CIED lead in situ.      AORTIC VALVE:  The aortic valve is  trileaflet in morphology.  No calcifications.      MITRAL VALVE:  No thickening/calcification.      THORACIC AORTA:  The visualized thoracic aorta is normal in course, caliber, and  contour. There is no acute aortic pathology, such as dissection,  intramural hematoma, or contained rupture. The aortic arch is not  included on this examination.      Dilated main pulmonary artery (3.1 cm).      PERICARDIUM:  There is no pericardial effusion of thickening.      CHEST:  The chest wall is normal.  No significant lymphadenopathy or mass is seen in limited images of  the mediastinum. Limited imaging through the lungs reveals no gross  abnormalities. No pleural effusion or pneumothorax.      UPPER ABDOMEN:  Limited imaging through the upper abdomen reveals no abnormalities of  the visualized organs.      Impression: 1. Nonobstructive coronary artery disease.  2. The proximal LAD has focal calcified atherosclerosis with minimal  stenosis (<25%)  3. The LCx and RCA have no significant atherosclerotic change or  stenotic disease.  4. Right-dominant coronary system  5. Coronary artery calcium score 13.  6. Dilated main pulmonary artery (3.1 cm). Correlate/concern for  elevated pulmonary pressures.          MACRO:  None      Signed by: Vivian Godwin 11/11/2024 10:11  AM  Dictation workstation:   [id number]        Assessment/Plan     Cardiomyopathy possibly related to PVCs.  Low normal EF based on last echo.  I reviewed her physical exam labs recent CT I do not think she is showing signs of pocket infection.  I asked her to let us know if she develops any redness at the pocket site, arm swelling, fever, diminished left arm range of motion, chills or night sweats.  I believe her chest wall pain is musculoskeletal in nature and should resolve over time.        Orders:  No orders of the defined types were placed in this encounter.     Followup Appts:  Future Appointments   Date Time Provider Department Center   12/12/2024  9:30 AM Olga Newsome MD [id number] Academic   7/31/2025  8:45 AM Joaquín Urbina OD [id number] Academic           ____________________________________________________________  Lupillo Haddad MD    Senior Attending Physician  [facility]  [facility]

## 2024-12-02 ENCOUNTER — HOSPITAL ENCOUNTER (OUTPATIENT)
Dept: CARDIOLOGY | Facility: CLINIC | Age: 69
Discharge: HOME | End: 2024-12-02
Payer: COMMERCIAL

## 2024-12-02 DIAGNOSIS — I42.9 CARDIOMYOPATHY, UNSPECIFIED TYPE (MULTI): ICD-10-CM

## 2024-12-02 PROCEDURE — 93296 REM INTERROG EVL PM/IDS: CPT

## 2024-12-02 PROCEDURE — 93295 DEV INTERROG REMOTE 1/2/MLT: CPT | Performed by: INTERNAL MEDICINE

## 2024-12-12 ENCOUNTER — APPOINTMENT (OUTPATIENT)
Dept: PRIMARY CARE | Facility: CLINIC | Age: 69
End: 2024-12-12
Payer: COMMERCIAL

## 2024-12-12 VITALS
TEMPERATURE: 98 F | OXYGEN SATURATION: 94 % | HEIGHT: 59 IN | HEART RATE: 82 BPM | DIASTOLIC BLOOD PRESSURE: 69 MMHG | WEIGHT: 120.9 LBS | RESPIRATION RATE: 16 BRPM | SYSTOLIC BLOOD PRESSURE: 100 MMHG | BODY MASS INDEX: 24.37 KG/M2

## 2024-12-12 DIAGNOSIS — I10 PRIMARY HYPERTENSION: ICD-10-CM

## 2024-12-12 DIAGNOSIS — M79.10 MUSCLE ACHE: Primary | ICD-10-CM

## 2024-12-12 PROCEDURE — 3078F DIAST BP <80 MM HG: CPT

## 2024-12-12 PROCEDURE — 1159F MED LIST DOCD IN RCRD: CPT

## 2024-12-12 PROCEDURE — 3074F SYST BP LT 130 MM HG: CPT

## 2024-12-12 PROCEDURE — 99213 OFFICE O/P EST LOW 20 MIN: CPT

## 2024-12-12 PROCEDURE — 3008F BODY MASS INDEX DOCD: CPT

## 2024-12-12 PROCEDURE — 1126F AMNT PAIN NOTED NONE PRSNT: CPT

## 2024-12-12 PROCEDURE — RXMED WILLOW AMBULATORY MEDICATION CHARGE

## 2024-12-12 PROCEDURE — 1036F TOBACCO NON-USER: CPT

## 2024-12-12 PROCEDURE — G2211 COMPLEX E/M VISIT ADD ON: HCPCS

## 2024-12-12 RX ORDER — METHOCARBAMOL 500 MG/1
500 TABLET, FILM COATED ORAL 4 TIMES DAILY PRN
Qty: 120 TABLET | Refills: 0 | Status: SHIPPED | OUTPATIENT
Start: 2024-12-12 | End: 2025-01-11

## 2024-12-12 RX ORDER — ISOSORBIDE DINITRATE 20 MG/1
20 TABLET ORAL 3 TIMES DAILY
Qty: 270 TABLET | Refills: 2 | Status: SHIPPED | OUTPATIENT
Start: 2024-12-12 | End: 2025-12-11

## 2024-12-12 RX ORDER — SENNOSIDES 25 MG/1
1 TABLET, FILM COATED ORAL EVERY 6 HOURS PRN
Qty: 30 G | Refills: 2 | Status: SHIPPED | OUTPATIENT
Start: 2024-12-12 | End: 2025-01-11

## 2024-12-12 RX ORDER — BUTYROSPERMUM PARKII(SHEA BUTTER), SIMMONDSIA CHINENSIS (JOJOBA) SEED OIL, ALOE BARBADENSIS LEAF EXTRACT .01; 1; 3.5 G/100G; G/100G; G/100G
200 LIQUID TOPICAL DAILY
Qty: 90 CAPSULE | Refills: 2 | Status: SHIPPED | OUTPATIENT
Start: 2024-12-12

## 2024-12-12 ASSESSMENT — ENCOUNTER SYMPTOMS
LOSS OF SENSATION IN FEET: 0
DEPRESSION: 0
OCCASIONAL FEELINGS OF UNSTEADINESS: 0

## 2024-12-12 ASSESSMENT — PATIENT HEALTH QUESTIONNAIRE - PHQ9
SUM OF ALL RESPONSES TO PHQ9 QUESTIONS 1 AND 2: 0
2. FEELING DOWN, DEPRESSED OR HOPELESS: NOT AT ALL
1. LITTLE INTEREST OR PLEASURE IN DOING THINGS: NOT AT ALL

## 2024-12-12 ASSESSMENT — PAIN SCALES - GENERAL: PAINLEVEL_OUTOF10: 0-NO PAIN

## 2024-12-12 NOTE — PROGRESS NOTES
Patient ID: Sandy Smith is a 69 y.o. female with PMH of  HTN, pacemaker/defibrillator, and CHF  who presents for Follow-up (Defibrillator pain on left side of breast ).    Subjective     HPI  Patient seen in the ED 11/10 because she bumped her chest on the L side close to her defibrillator. Her CXR, trops, EKG, CT angio blood work were all wnl. She was seen by her cardiologist on 11/26 who did not believe her symptoms were cardiac related. She reports when it happened she noticed a dull pain proximal to her left lateral breast that was intermittent in nature. It last approxi 10 mins and nonradiating and she has not tried anything for it. Denies burning, tingling, erythema, nausea, vomiting, fevers, chills, shortness of breath.     Review of Systems   All other systems reviewed and are negative.    Past Medical History:   Diagnosis Date    Cardiomyopathy, unspecified     CHF (congestive heart failure)     CKD (chronic kidney disease)     Encounter for general adult medical examination without abnormal findings 01/31/2022    Encounter for initial preventive physical examination covered by Medicare    Hypertension     Otitis media, unspecified, right ear 01/31/2014    Acute right otitis media    Personal history of other diseases of the digestive system 07/21/2013    History of esophageal reflux    Personal history of other diseases of the musculoskeletal system and connective tissue 07/21/2013    History of low back pain    Personal history of other specified conditions 07/21/2013    History of angioedema     Past Surgical History:   Procedure Laterality Date    BONE GRAFT Left     CARDIAC DEFIBRILLATOR PLACEMENT      COLONOSCOPY  08/23/2013    Colonoscopy (Fiberoptic)    CT ANGIO CORONARY ART WITH HEARTFLOW IF SCORE >30%  11/20/2018    [place] HEART [place] [facility] [place]    CT ANGIO NECK  01/29/2022    CT NECK ANGIO W AND WO IV CONTRAST 1/29/2022 [facility] CLINICAL LEGACY    KNEE SURGERY Left      OTHER SURGICAL HISTORY  2021    Pacemaker insertion    OTHER SURGICAL HISTORY  2018    Umbilical hernia repair    OTHER SURGICAL HISTORY  2013    Wrist Surgery     Family History   Problem Relation Name Age of Onset    Aneurysm Mother      Heart disease Mother      Hyperlipidemia Mother      Hypertension Mother      Heart disease Father      Hypertension Father      Other (cardiac disorder) Sister      Coronary artery disease Sister      Other (cardiac disorder) Brother      Coronary artery disease Brother       Cephalexin and Lisinopril   Social History     Tobacco Use    Smoking status: Former     Current packs/day: 0.00     Types: Cigarettes     Quit date: 1970     Years since quittin.9     Passive exposure: Past    Smokeless tobacco: Never   Substance Use Topics    Alcohol use: Yes     Comment: occasional       Current Outpatient Medications on File Prior to Visit   Medication Sig Dispense Refill    acetaminophen (TylenoL) 325 mg tablet Take 2 tablets (650 mg) by mouth every 6 hours if needed for mild pain (1 - 3) or fever (temp greater than 38.0 C). 30 tablet 0    calcium carbonate (Tums) 200 mg calcium chewable tablet Chew 1 tablet (500 mg) 2 times a day. 60 tablet 2    cholecalciferol, vitamin D3, 10 mcg (400 unit) capsule TAKE 1 CAPSULE Daily      ciclopirox (Penlac) 8 % solution Apply topically once daily at bedtime. 6.6 mL 0    empagliflozin (Jardiance) 10 mg TAKE 1 TABLET BY MOUTH ONCE DAILY 90 tablet 2    hydrALAZINE (Apresoline) 10 mg tablet TAKE 1 TABLET BY MOUTH EVERY 8 HOURS 540 tablet 1    metoprolol succinate XL (Toprol-XL) 25 mg 24 hr tablet Take 1 tablet (25 mg) by mouth once daily. 90 tablet 3    NaCl-NaHCO3-hyaluron sod-aloe (NasogeL) 0.9 % spray gel USE AS DIRECTED.      spironolactone (Aldactone) 25 mg tablet TAKE 1 TABLET (25 MG) BY MOUTH ONCE DAILY. 90 tablet 3    [DISCONTINUED] amiodarone (Pacerone) 200 mg tablet Take 1 tablet (200 mg) by mouth once daily. 30  "tablet 2    [DISCONTINUED] aspirin 81 mg EC tablet Take 1 tablet (81 mg) by mouth once daily. (Patient not taking: Reported on 11/26/2024) 30 tablet 11    [DISCONTINUED] atorvastatin (Lipitor) 40 mg tablet Take 1 tablet (40 mg) by mouth once daily. (Patient not taking: Reported on 11/26/2024) 30 tablet 0    [DISCONTINUED] estradiol (Estrace) 0.01 % (0.1 mg/gram) vaginal cream Insert 0.5 Applicatorfuls (2 g) into the vagina once daily. Apply to vagina nightly for 1 week then every Monday/Wednesday/Friday. (Patient not taking: Reported on 11/26/2024) 42.5 g 11    [DISCONTINUED] HYDROcodone-acetaminophen (Norco) 5-325 mg tablet Take 1 tablet by mouth every 6 hours if needed for severe pain (7 - 10). (Patient not taking: Reported on 11/26/2024) 12 tablet 0    [DISCONTINUED] isosorbide dinitrate (Isordil) 20 mg tablet TAKE 1 TABLET BY MOUTH THREE TIMES A  tablet 2    [DISCONTINUED] lidocaine (Xylocaine) 5 % cream cream Apply to area of pain once daily as needed. (Patient not taking: Reported on 11/26/2024)      [DISCONTINUED] methocarbamol (Robaxin) 500 mg tablet Take 1-2 tablets as needed at bedtime for muscle tightness, spasms (Patient not taking: Reported on 11/26/2024)      [DISCONTINUED] ondansetron (Zofran) 4 mg tablet Take 1 tablet (4 mg) by mouth every 8 hours if needed for nausea or vomiting. (Patient not taking: Reported on 11/26/2024) 10 tablet 0     No current facility-administered medications on file prior to visit.        Objective   Vitals: /69 (BP Location: Right arm, Patient Position: Sitting, BP Cuff Size: Adult)   Pulse 82   Temp 36.7 °C (98 °F) (Temporal)   Resp 16   Ht 1.499 m (4' 11\")   Wt 54.8 kg (120 lb 14.4 oz)   SpO2 94%   BMI 24.42 kg/m²      Physical Exam  Vitals reviewed.   Constitutional:       General: She is not in acute distress.     Appearance: Normal appearance.   HENT:      Head: Atraumatic.      Mouth/Throat:      Mouth: Mucous membranes are moist.      Pharynx: " Oropharynx is clear.   Eyes:      Extraocular Movements: Extraocular movements intact.      Conjunctiva/sclera: Conjunctivae normal.      Pupils: Pupils are equal, round, and reactive to light.   Cardiovascular:      Rate and Rhythm: Normal rate and regular rhythm.      Pulses: Normal pulses.      Heart sounds: Normal heart sounds. No murmur heard.     No gallop.   Pulmonary:      Effort: No respiratory distress.      Breath sounds: Normal breath sounds. No wheezing or rales.   Abdominal:      General: Bowel sounds are normal.      Palpations: There is no mass.      Tenderness: There is no abdominal tenderness. There is no guarding.   Musculoskeletal:         General: No swelling, tenderness or deformity. Normal range of motion.   Skin:     General: Skin is warm and dry.      Capillary Refill: Capillary refill takes less than 2 seconds.   Neurological:      Mental Status: She is alert.         Assessment/Plan    68 y.o. female with PMH of HTN, pacemaker/defibrillator, and CHF  who presents for fu  Problem List Items Addressed This Visit       Hypertension    Relevant Medications    isosorbide dinitrate (Isordil) 20 mg tablet     Other Visit Diagnoses       Muscle ache    -  Primary    Relevant Medications    methocarbamol (Robaxin) 500 mg tablet    lidocaine (Xylocaine) 5 % cream cream    magnesium citrate 100 mg capsule          #MSK pain 2/2 mechanical trauma   - Seen in the ED with negative cardiac findings   - Exam findings unremarkable   - Recommended using lidocaine cream to the area and taking robaxin prn   - Also recommended using heat pack and tylenol or ibuprofen for pain     #TMJ  - she reports improvement in her symptoms however, she has been having intermittent pain  - CT maxillofacial bones 10/17/23 w/ osteoarthritis of the TMJ bones   - Recommended to c/w tylenol, cool/heat compression, mouthguard for sleeping as well as good sleep hygiene, soft foods such as bananas, cooked carrots, potatoes, and  daily jaw exercises   - Magnesium sent to help with anxiety and sleep, pt report being under stress related to her friend and sister passing recently   - Robaxin may also help alleviate some of the s/s    Patient d/w attending Dr. Yan    RTC in 6 months, or earlier as needed.    NIA Newsome MD   Family Medicine, PGY2

## 2024-12-16 DIAGNOSIS — M81.0 OSTEOPOROSIS WITHOUT CURRENT PATHOLOGICAL FRACTURE, UNSPECIFIED OSTEOPOROSIS TYPE: ICD-10-CM

## 2024-12-17 ENCOUNTER — PHARMACY VISIT (OUTPATIENT)
Dept: PHARMACY | Facility: CLINIC | Age: 69
End: 2024-12-17
Payer: COMMERCIAL

## 2024-12-17 PROCEDURE — RXMED WILLOW AMBULATORY MEDICATION CHARGE

## 2024-12-18 ENCOUNTER — HOSPITAL ENCOUNTER (OUTPATIENT)
Dept: CARDIOLOGY | Facility: CLINIC | Age: 69
Discharge: HOME | End: 2024-12-18
Payer: COMMERCIAL

## 2024-12-18 DIAGNOSIS — I47.29 OTHER VENTRICULAR TACHYCARDIA: ICD-10-CM

## 2024-12-18 DIAGNOSIS — Z95.810 PRESENCE OF AUTOMATIC (IMPLANTABLE) CARDIAC DEFIBRILLATOR: ICD-10-CM

## 2024-12-18 PROCEDURE — 93283 PRGRMG EVAL IMPLANTABLE DFB: CPT

## 2024-12-18 PROCEDURE — RXMED WILLOW AMBULATORY MEDICATION CHARGE

## 2024-12-18 RX ORDER — CALCIUM CARBONATE 200(500)MG
1 TABLET,CHEWABLE ORAL 2 TIMES DAILY
Qty: 90 TABLET | Refills: 2 | Status: SHIPPED | OUTPATIENT
Start: 2024-12-18

## 2024-12-26 ENCOUNTER — HOSPITAL ENCOUNTER (EMERGENCY)
Facility: HOSPITAL | Age: 69
Discharge: HOME | End: 2024-12-26
Attending: EMERGENCY MEDICINE
Payer: COMMERCIAL

## 2024-12-26 ENCOUNTER — PHARMACY VISIT (OUTPATIENT)
Dept: PHARMACY | Facility: CLINIC | Age: 69
End: 2024-12-26
Payer: COMMERCIAL

## 2024-12-26 VITALS
HEART RATE: 80 BPM | DIASTOLIC BLOOD PRESSURE: 67 MMHG | OXYGEN SATURATION: 97 % | TEMPERATURE: 98.2 F | WEIGHT: 120 LBS | RESPIRATION RATE: 17 BRPM | SYSTOLIC BLOOD PRESSURE: 100 MMHG | HEIGHT: 60 IN | BODY MASS INDEX: 23.56 KG/M2

## 2024-12-26 DIAGNOSIS — J02.9 VIRAL PHARYNGITIS: ICD-10-CM

## 2024-12-26 DIAGNOSIS — J06.9 VIRAL UPPER RESPIRATORY TRACT INFECTION: Primary | ICD-10-CM

## 2024-12-26 LAB
FLUAV RNA RESP QL NAA+PROBE: NOT DETECTED
FLUBV RNA RESP QL NAA+PROBE: NOT DETECTED
S PYO DNA THROAT QL NAA+PROBE: NOT DETECTED
SARS-COV-2 RNA RESP QL NAA+PROBE: NOT DETECTED

## 2024-12-26 PROCEDURE — 99284 EMERGENCY DEPT VISIT MOD MDM: CPT

## 2024-12-26 PROCEDURE — 99283 EMERGENCY DEPT VISIT LOW MDM: CPT | Mod: 25 | Performed by: EMERGENCY MEDICINE

## 2024-12-26 PROCEDURE — 87651 STREP A DNA AMP PROBE: CPT

## 2024-12-26 PROCEDURE — 87636 SARSCOV2 & INF A&B AMP PRB: CPT

## 2024-12-26 PROCEDURE — RXMED WILLOW AMBULATORY MEDICATION CHARGE

## 2024-12-26 RX ORDER — GUAIFENESIN 600 MG/1
1200 TABLET, EXTENDED RELEASE ORAL 2 TIMES DAILY
Qty: 20 TABLET | Refills: 0 | Status: SHIPPED | OUTPATIENT
Start: 2024-12-26 | End: 2024-12-31

## 2024-12-26 RX ORDER — GUAIFENESIN 600 MG/1
1200 TABLET, EXTENDED RELEASE ORAL 2 TIMES DAILY
Qty: 20 TABLET | Refills: 0 | Status: SHIPPED | OUTPATIENT
Start: 2024-12-26 | End: 2024-12-26

## 2024-12-26 RX ORDER — FLUTICASONE PROPIONATE 50 MCG
1 SPRAY, SUSPENSION (ML) NASAL DAILY
Qty: 16 G | Refills: 0 | Status: SHIPPED | OUTPATIENT
Start: 2024-12-26 | End: 2025-02-24

## 2024-12-26 ASSESSMENT — LIFESTYLE VARIABLES
EVER HAD A DRINK FIRST THING IN THE MORNING TO STEADY YOUR NERVES TO GET RID OF A HANGOVER: NO
EVER FELT BAD OR GUILTY ABOUT YOUR DRINKING: NO
HAVE YOU EVER FELT YOU SHOULD CUT DOWN ON YOUR DRINKING: NO
TOTAL SCORE: 0
HAVE PEOPLE ANNOYED YOU BY CRITICIZING YOUR DRINKING: NO

## 2024-12-26 ASSESSMENT — PAIN SCALES - GENERAL: PAINLEVEL_OUTOF10: 7

## 2024-12-26 ASSESSMENT — PAIN - FUNCTIONAL ASSESSMENT: PAIN_FUNCTIONAL_ASSESSMENT: 0-10

## 2024-12-26 ASSESSMENT — PAIN DESCRIPTION - PAIN TYPE: TYPE: ACUTE PAIN

## 2024-12-26 NOTE — ED TRIAGE NOTES
Pt presented to ED for c/o sore throat and difficulties swallowing. Denies SOB,CP. No other symptoms. Airway is intact and talking in full sentences.

## 2024-12-26 NOTE — DISCHARGE INSTRUCTIONS
You were seen and treated today in the ED for sore throat and sinus drainage.  Testing was negative for influenza, covid, strep pharyngitis. Your symptoms are most likely due to a viral upper respiratory infection. You are given prescriptions for Mucinex and a HurriCaine throat spray that you may take as prescribed for symptomatic relief.  You are encouraged to rest and drink plenty of fluids.  A referral was placed for primary care that you may call and schedule an appointment with to follow-up with.  Otherwise he may return to the ED with any new or worsening symptoms.

## 2024-12-26 NOTE — ED PROVIDER NOTES
"    History of Present Illness       History provided by: Patient  Limitations to History: None    HPI:  HPI   Patient is a 69-year-old female that presents to the ED for sore throat.  She states that she has felt overall congested with sinus drainage into her throat over the past 2 days.  She endorses a sore throat with some difficulty swallowing due to the mucus.  She describes it as feeling \"sticky.\" she denies cough but has been trying to cough up the mucus.  She says it is especially bad at night.  She took Claritin yesterday without any relief.  Additionally she endorses bilateral ear pain.  She denies headache but admits to general fullness in her sinuses.  She has been eating and drinking normally without nausea or vomiting.  She has not been around anyone else sick but states that she has not gotten her flu vaccine and is concerned that she has the flu.      Physical Exam   Physical Exam     VS: As documented in the triage note and EMR flowsheet from this visit were reviewed.    Appearance: Alert, oriented, cooperative, in no acute distress. Well nourished & well hydrated.    Skin: Atraumatic. Warm, intact and dry. No lesions, rash, or petechiae.    Eyes: PERRLA, EOMs intact. No pain with EOMs. No nystagmus. Bilateral conjunctivae pink without injection or exudates. No hyphema or subconjunctival hemorrhage.    ENT: Hearing grossly intact. External auditory canals patent, tympanic membranes intact with visible landmarks. Nares patent, mucus membranes moist. Pharynx clear, uvula midline and non-edematous. No tonsillar hypertrophy or exudates. Bilateral tonsillar pillars visualized and intact, non-obscured. No drooling, dysphagia or trismus. Voice non-muffled.    Neck: Supple, without meningismus. No lymphadenopathy.     Pulmonary: Clear bilaterally with good chest wall excursion. No rales, rhonchi or wheezing. No accessory muscle use or stridor. Nonlabored breathing, no supplemental oxygen. No pain with deep " breathing.    Cardiac: Normal S1, S2 without murmur, rub, gallop or extrasystole.     Musculoskeletal: Spontaneously moving all extremities without limitation. Extremities warm and well-perfused, capillary refill less than 2 seconds. Pulses full and equal. No TTP, cyanosis, clubbing or deformity noted.    Neurological:  Cranial nerves II through XII are grossly intact. Ambulating without assistance with steady gait, non-ataxic.    Psychiatric: Appropriate mood and affect. Kempt appearance. Does not appear internally stimulated.             Medical Decision Making & ED Course     ED Course & MDM       Medical Decision Making:  Medical Decision Making     ----  Patient is a 69-year-old female that presents to the ED for a sore throat. History obtained from patient. History and physical exam are as documented above.  Blood pressure 100/67.  All other vital signs are stable and within normal limits.  Patient seen and discussed with ED attending. Differential diagnoses considered include but are not limited to: Strep pharyngitis, viral pharyngitis, influenza, COVID-19, sinusitis.    Patient is resting comfortably in the ED chair for exam.  She does not appear to be in any acute distress.  Posterior pharynx does not appear erythematous or swollen, uvula is midline, tonsils are visualized without any swelling or exudates.  She is swabbed for strep pharyngitis.  Additionally she expresses concerns that she did not get her flu shot this year and she feels that she might have the flu.  Viral swabs obtained for COVID and influenza. Strep test was negative. Her main concerns are her mucus drainage and ear pain, she feels that she needs antibiotics.  Patient's tympanic membranes are intact, nonbulging, nonerythematous, low suspicion for otitis media.  Patient is educated that there is no indication for antibiotic use for her ear pain at this time.  Additionally, she reports taking Claritin at home without relief of her  mucus/congestion.  She is educated that a medication such as Mucinex may be more beneficial for her symptomatic relief rather than Claritin or an antibiotic as this is likely viral. Patient expresses understanding of this and is given prescriptions for mucinex and flonase for sinus relief and hurricane mouth spray that she may use as prescribed for her sore throat. She is encouraged to rest and increase fluids. She is given strict return precautions to return to the ED for any new or worsening symptoms including but not limited to fevers, difficulty breathing, drooling. She is in agreement with this plan and stable for discharge home.             Visit Vitals  /67   Pulse 80   Temp 36.8 °C (98.2 °F) (Oral)   Resp 17   Ht 1.524 m (5')   Wt 54.4 kg (120 lb)   SpO2 97%   BMI 23.44 kg/m²   OB Status Postmenopausal   Smoking Status Former   BSA 1.52 m²        Labs Reviewed   GROUP A STREPTOCOCCUS, PCR   SARS-COV-2 AND INFLUENZA A/B PCR       No orders to display       ED Course:  Diagnoses as of 12/26/24 1440   Viral upper respiratory tract infection   Viral pharyngitis     Disposition   Discharged in stable condition.    Procedures   Procedures      Silke Johnson PA-C  Emergency Medicine      Silke Johnson PA-C  12/26/24 7514

## 2025-01-06 ENCOUNTER — APPOINTMENT (OUTPATIENT)
Dept: CARDIOLOGY | Facility: CLINIC | Age: 70
End: 2025-01-06
Payer: COMMERCIAL

## 2025-01-08 PROCEDURE — RXMED WILLOW AMBULATORY MEDICATION CHARGE

## 2025-01-10 ENCOUNTER — PHARMACY VISIT (OUTPATIENT)
Dept: PHARMACY | Facility: CLINIC | Age: 70
End: 2025-01-10
Payer: COMMERCIAL

## 2025-01-13 ENCOUNTER — CLINICAL SUPPORT (OUTPATIENT)
Dept: EMERGENCY MEDICINE | Facility: HOSPITAL | Age: 70
End: 2025-01-13
Payer: MEDICARE

## 2025-01-13 ENCOUNTER — HOSPITAL ENCOUNTER (EMERGENCY)
Facility: HOSPITAL | Age: 70
Discharge: HOME | End: 2025-01-13
Attending: EMERGENCY MEDICINE
Payer: MEDICARE

## 2025-01-13 ENCOUNTER — APPOINTMENT (OUTPATIENT)
Dept: RADIOLOGY | Facility: HOSPITAL | Age: 70
End: 2025-01-13
Payer: MEDICARE

## 2025-01-13 VITALS
OXYGEN SATURATION: 97 % | WEIGHT: 122 LBS | TEMPERATURE: 98.1 F | HEIGHT: 59 IN | SYSTOLIC BLOOD PRESSURE: 125 MMHG | BODY MASS INDEX: 24.6 KG/M2 | RESPIRATION RATE: 18 BRPM | HEART RATE: 72 BPM | DIASTOLIC BLOOD PRESSURE: 83 MMHG

## 2025-01-13 DIAGNOSIS — R07.9 CHEST PAIN, UNSPECIFIED TYPE: Primary | ICD-10-CM

## 2025-01-13 LAB
ALBUMIN SERPL BCP-MCNC: 4.3 G/DL (ref 3.4–5)
ALP SERPL-CCNC: 59 U/L (ref 33–136)
ALT SERPL W P-5'-P-CCNC: 32 U/L (ref 7–45)
ANION GAP SERPL CALC-SCNC: 14 MMOL/L (ref 10–20)
AST SERPL W P-5'-P-CCNC: 29 U/L (ref 9–39)
BASOPHILS # BLD AUTO: 0.02 X10*3/UL (ref 0–0.1)
BASOPHILS NFR BLD AUTO: 0.3 %
BILIRUB SERPL-MCNC: 0.8 MG/DL (ref 0–1.2)
BUN SERPL-MCNC: 16 MG/DL (ref 6–23)
CALCIUM SERPL-MCNC: 10 MG/DL (ref 8.6–10.6)
CARDIAC TROPONIN I PNL SERPL HS: 10 NG/L (ref 0–34)
CARDIAC TROPONIN I PNL SERPL HS: 12 NG/L (ref 0–34)
CHLORIDE SERPL-SCNC: 106 MMOL/L (ref 98–107)
CO2 SERPL-SCNC: 27 MMOL/L (ref 21–32)
CREAT SERPL-MCNC: 1.08 MG/DL (ref 0.5–1.05)
EGFRCR SERPLBLD CKD-EPI 2021: 56 ML/MIN/1.73M*2
EOSINOPHIL # BLD AUTO: 0.1 X10*3/UL (ref 0–0.7)
EOSINOPHIL NFR BLD AUTO: 1.6 %
ERYTHROCYTE [DISTWIDTH] IN BLOOD BY AUTOMATED COUNT: 13.7 % (ref 11.5–14.5)
GLUCOSE SERPL-MCNC: 84 MG/DL (ref 74–99)
HCT VFR BLD AUTO: 46.5 % (ref 36–46)
HGB BLD-MCNC: 15.1 G/DL (ref 12–16)
IMM GRANULOCYTES # BLD AUTO: 0.02 X10*3/UL (ref 0–0.7)
IMM GRANULOCYTES NFR BLD AUTO: 0.3 % (ref 0–0.9)
LYMPHOCYTES # BLD AUTO: 1.94 X10*3/UL (ref 1.2–4.8)
LYMPHOCYTES NFR BLD AUTO: 31.5 %
MAGNESIUM SERPL-MCNC: 2.32 MG/DL (ref 1.6–2.4)
MCH RBC QN AUTO: 27.2 PG (ref 26–34)
MCHC RBC AUTO-ENTMCNC: 32.5 G/DL (ref 32–36)
MCV RBC AUTO: 84 FL (ref 80–100)
MONOCYTES # BLD AUTO: 0.62 X10*3/UL (ref 0.1–1)
MONOCYTES NFR BLD AUTO: 10.1 %
NEUTROPHILS # BLD AUTO: 3.45 X10*3/UL (ref 1.2–7.7)
NEUTROPHILS NFR BLD AUTO: 56.2 %
NRBC BLD-RTO: 0 /100 WBCS (ref 0–0)
PLATELET # BLD AUTO: 245 X10*3/UL (ref 150–450)
POTASSIUM SERPL-SCNC: 4 MMOL/L (ref 3.5–5.3)
PROT SERPL-MCNC: 8.2 G/DL (ref 6.4–8.2)
RBC # BLD AUTO: 5.56 X10*6/UL (ref 4–5.2)
SODIUM SERPL-SCNC: 143 MMOL/L (ref 136–145)
WBC # BLD AUTO: 6.2 X10*3/UL (ref 4.4–11.3)

## 2025-01-13 PROCEDURE — 80053 COMPREHEN METABOLIC PANEL: CPT | Performed by: STUDENT IN AN ORGANIZED HEALTH CARE EDUCATION/TRAINING PROGRAM

## 2025-01-13 PROCEDURE — 83735 ASSAY OF MAGNESIUM: CPT | Performed by: STUDENT IN AN ORGANIZED HEALTH CARE EDUCATION/TRAINING PROGRAM

## 2025-01-13 PROCEDURE — 85025 COMPLETE CBC W/AUTO DIFF WBC: CPT | Performed by: STUDENT IN AN ORGANIZED HEALTH CARE EDUCATION/TRAINING PROGRAM

## 2025-01-13 PROCEDURE — 93005 ELECTROCARDIOGRAM TRACING: CPT

## 2025-01-13 PROCEDURE — 36415 COLL VENOUS BLD VENIPUNCTURE: CPT | Performed by: STUDENT IN AN ORGANIZED HEALTH CARE EDUCATION/TRAINING PROGRAM

## 2025-01-13 PROCEDURE — 71046 X-RAY EXAM CHEST 2 VIEWS: CPT

## 2025-01-13 PROCEDURE — 84484 ASSAY OF TROPONIN QUANT: CPT | Performed by: STUDENT IN AN ORGANIZED HEALTH CARE EDUCATION/TRAINING PROGRAM

## 2025-01-13 PROCEDURE — 99285 EMERGENCY DEPT VISIT HI MDM: CPT | Mod: 25 | Performed by: EMERGENCY MEDICINE

## 2025-01-13 PROCEDURE — 71046 X-RAY EXAM CHEST 2 VIEWS: CPT | Performed by: RADIOLOGY

## 2025-01-13 ASSESSMENT — LIFESTYLE VARIABLES
EVER HAD A DRINK FIRST THING IN THE MORNING TO STEADY YOUR NERVES TO GET RID OF A HANGOVER: NO
HAVE PEOPLE ANNOYED YOU BY CRITICIZING YOUR DRINKING: NO
EVER FELT BAD OR GUILTY ABOUT YOUR DRINKING: NO
TOTAL SCORE: 0
HAVE YOU EVER FELT YOU SHOULD CUT DOWN ON YOUR DRINKING: NO

## 2025-01-13 ASSESSMENT — PAIN SCALES - GENERAL: PAINLEVEL_OUTOF10: 0 - NO PAIN

## 2025-01-13 ASSESSMENT — PAIN DESCRIPTION - ONSET: ONSET: ONGOING

## 2025-01-13 ASSESSMENT — PAIN DESCRIPTION - FREQUENCY: FREQUENCY: INTERMITTENT

## 2025-01-13 ASSESSMENT — PAIN DESCRIPTION - PAIN TYPE: TYPE: ACUTE PAIN

## 2025-01-13 ASSESSMENT — PAIN DESCRIPTION - DESCRIPTORS
DESCRIPTORS: ACHING
DESCRIPTORS: ACHING

## 2025-01-13 ASSESSMENT — COLUMBIA-SUICIDE SEVERITY RATING SCALE - C-SSRS
2. HAVE YOU ACTUALLY HAD ANY THOUGHTS OF KILLING YOURSELF?: NO
1. IN THE PAST MONTH, HAVE YOU WISHED YOU WERE DEAD OR WISHED YOU COULD GO TO SLEEP AND NOT WAKE UP?: NO

## 2025-01-13 ASSESSMENT — PAIN DESCRIPTION - ORIENTATION: ORIENTATION: LEFT

## 2025-01-13 ASSESSMENT — PAIN - FUNCTIONAL ASSESSMENT: PAIN_FUNCTIONAL_ASSESSMENT: 0-10

## 2025-01-13 ASSESSMENT — PAIN DESCRIPTION - LOCATION: LOCATION: CHEST

## 2025-01-13 ASSESSMENT — PAIN DESCRIPTION - PROGRESSION: CLINICAL_PROGRESSION: GRADUALLY WORSENING

## 2025-01-14 NOTE — ED PROVIDER NOTES
History of Present Illness       History provided by: Patient  Limitations to History: None  External Records Reviewed with Brief Summary:  Notes reviewed in patient's chart    HPI:  AMANDA Smith is a 69-year-old female with a past medical history of HTN, CHF, T2DM, cardiomyopathy, nonsustained ventricular tachycardia with ICD placement in 2021 presenting today for left chest pain.  Patient states about 2 months ago she hit the side of a wall where her defibrillator is located.  Since then she has had left-sided discomfort that comes and goes mostly noted on the side of her left breast as well as the upper portion of her left chest where one of the leads was placed.  States if she moves her left breast in a certain way the discomfort resolves.  States she has been seen for something similar to this in the past in which she was told it was a musculoskeletal strain.  Denies taking medications for this pain. Denies shortness of breath, headache, dizziness, fever, chills, body aches, abdominal pain, swelling, skin changes.      Physical Exam   Physical Exam  Constitutional:       Appearance: She is well-developed.   HENT:      Head: Normocephalic and atraumatic.      Mouth/Throat:      Mouth: Mucous membranes are moist.      Pharynx: Oropharynx is clear.   Eyes:      Extraocular Movements: Extraocular movements intact.      Pupils: Pupils are equal, round, and reactive to light.   Cardiovascular:      Comments: Normal heart sounds with paced rhythm  Pulmonary:      Breath sounds: Normal breath sounds.   Chest:      Comments: Scars from ICD placement are well-healed without signs of infection.  No fluid collection or abnormality noted.  Chest is not tender to palpation.  No skin changes.  Abdominal:      General: Abdomen is flat.      Palpations: Abdomen is soft.   Musculoskeletal:         General: Normal range of motion.      Right shoulder: Normal.      Left shoulder: Normal.      Cervical back: Normal, full  passive range of motion without pain and normal range of motion.      Thoracic back: Normal.      Lumbar back: Normal.      Right lower leg: No edema.      Left lower leg: No edema.   Skin:     General: Skin is warm.   Neurological:      General: No focal deficit present.      Mental Status: She is alert and oriented to person, place, and time.   Psychiatric:         Mood and Affect: Mood normal.         Behavior: Behavior normal.          Triage vitals:  T 36.7 °C (98.1 °F)  HR 72  /83  RR 18  O2 97 % None (Room air)    Medical Decision Making & ED Course     ED Course & MDM       Medical Decision Making:  Medical Decision Making  Sandy Smith is a 69-year-old female with a past medical history of HTN, CHF, T2DM, cardiomyopathy, nonsustained ventricular tachycardia with ICD placement in 2021 presenting today for left chest pain.  Patient states about 2 months ago she hit the side of a wall where her defibrillator is located.  Since then she has had left-sided discomfort that comes and goes mostly noted on the side of her left breast as well as the upper portion of her left chest where one of the leads was placed.  States if she moves her left breast in a certain way the discomfort resolves.  States she has been seen for something similar to this in the past in which she was told it was a musculoskeletal strain.  Denies taking medications for this pain.  Denies shortness of breath, headache, dizziness, fever, chills, body aches, abdominal pain, swelling, skin changes.  On exam patient is sitting in bed comfortably.  Normal heart sounds with paced rhythm.  Normal breath sounds.  Abdomen not tender to palpation.  No leg swelling noted.  Left chest and breast is not tender to palpation.  No skin changes noted; no erythema, warmth, swelling, rash/cellulitis. Per chart review patient was seen on 11/10/2024 for similar symptoms in which she was admitted to the CDU for a CTA which showed nonobstructive coronary  "artery disease and discharged home.  Orders were placed while in triage.  Magnesium normal.  Troponin 12.  CBC normal without leukocytosis or anemia.  CMP within normal limits.  Chest x-ray no acute findings and appears similar to previous x-ray; ICD and leads appear in place.  EKG shows Atrial paced rhythm, 82 bpm, , QRS 84, QTc 497.  No acute ST elevation or depression.  At this time labs and imaging appear reassuring with low suspicion of ACS or worsening of CHF and do not feel further workup is necessary.  Patient discharged in stable condition with musculoskeletal strain and has agreed to follow-up with primary care and cardiology.  Advised to take Tylenol/ibuprofen for discomfort management and return to ED for new or worsening of symptoms.    Differential diagnoses considered include but are not limited to: ACS, infection, worsening of CHF, musculoskeletal strain, displacement of ICD lead     Social Determinants of Health which Significantly Impact Care: None identified     EKG Independent Interpretation: EKG interpreted by myself. Please see ED Course for full interpretation.    Independent Result Review and Interpretation: Relevant laboratory and radiographic results were reviewed and independently interpreted by myself.  As necessary, they are commented on in the ED Course.    Chronic conditions affecting the patient's care: As documented above in MDM    The patient was discussed with the following consultants/services: None    Care Considerations: As documented above in MDM      Visit Vitals  /83 (BP Location: Right arm, Patient Position: Sitting)   Pulse 72   Temp 36.7 °C (98.1 °F) (Temporal)   Resp 18   Ht 1.499 m (4' 11\")   Wt 55.3 kg (122 lb)   SpO2 97%   BMI 24.64 kg/m²   OB Status Postmenopausal   Smoking Status Former   BSA 1.52 m²        Labs Reviewed   CBC WITH AUTO DIFFERENTIAL - Abnormal       Result Value    WBC 6.2      nRBC 0.0      RBC 5.56 (*)     Hemoglobin 15.1      " Hematocrit 46.5 (*)     MCV 84      MCH 27.2      MCHC 32.5      RDW 13.7      Platelets 245      Neutrophils % 56.2      Immature Granulocytes %, Automated 0.3      Lymphocytes % 31.5      Monocytes % 10.1      Eosinophils % 1.6      Basophils % 0.3      Neutrophils Absolute 3.45      Immature Granulocytes Absolute, Automated 0.02      Lymphocytes Absolute 1.94      Monocytes Absolute 0.62      Eosinophils Absolute 0.10      Basophils Absolute 0.02     COMPREHENSIVE METABOLIC PANEL - Abnormal    Glucose 84      Sodium 143      Potassium 4.0      Chloride 106      Bicarbonate 27      Anion Gap 14      Urea Nitrogen 16      Creatinine 1.08 (*)     eGFR 56 (*)     Calcium 10.0      Albumin 4.3      Alkaline Phosphatase 59      Total Protein 8.2      AST 29      Bilirubin, Total 0.8      ALT 32     MAGNESIUM - Normal    Magnesium 2.32     SERIAL TROPONIN-INITIAL - Normal    Troponin I, High Sensitivity (CMC) 12      Narrative:     Less than 99th percentile of normal range cutoff-  Female and children under 18 years old <35 ng/L; Male <54 ng/L: Negative  Repeat testing should be performed if clinically indicated.     Female and children under 18 years old  ng/L; Male  ng/L:  Consistent with possible cardiac damage and possible increased clinical   risk. Serial measurements may help to assess extent of myocardial damage.     >120 ng/L: Consistent with cardiac damage, increased clinical risk and  myocardial infarction. Serial measurements may help assess extent of   myocardial damage.      NOTE: Children less than 1 year old may have higher baseline troponin   levels and results should be interpreted in conjunction with the overall   clinical context.    NOTE: Troponin I testing is performed using a different   testing methodology at [facility] than at other   [facility] hospitals. Direct result comparisons should only   be made within the same method.     TROPONIN SERIES- (INITIAL, 1 HR)     Narrative:     The following orders were created for panel order Troponin I Series, High Sensitivity (0, 1 HR).  Procedure                               Abnormality         Status                     ---------                               -----------         ------                     Troponin I, High Sensiti...[[id number]]  Normal              Final result               Troponin, High Sensitivi...[[id number]]                      In process                   Please view results for these tests on the individual orders.   SERIAL TROPONIN, 1 HOUR       XR chest 2 views   Final Result   1. No acute cardiopulmonary process.   2. Mildly enlarged cardiac silhouette size.        MACRO:   None.        Signed by: Sandra Ortega 1/13/2025 1:52 PM   Dictation workstation:   [id number]          ED Course:  Diagnoses as of 01/13/25 2119   Chest pain, unspecified type     Disposition     Patient discharged in stable condition.  Advised to return to ED for new or worsening symptoms.  Has agreed to follow-up with primary care and cardiology.    Procedures   Procedures    This was a shared visit with an ED attending.  The patient was seen and discussed with the ED attending    Ceci Rodriguez PA-C  Emergency Medicine      Ceci Rodriguez PA-C  01/13/25 2126       Ceci Rodriguez PA-C  01/13/25 2129

## 2025-01-14 NOTE — DISCHARGE INSTRUCTIONS
Follow-up with cardiology and primary care provider within the next few days.  Return to ED for new or worsening of symptoms.

## 2025-01-15 LAB
ATRIAL RATE: 61 BPM
P AXIS: 53 DEGREES
P OFFSET: 195 MS
P ONSET: 132 MS
PR INTERVAL: 170 MS
Q ONSET: 217 MS
QRS COUNT: 13 BEATS
QRS DURATION: 84 MS
QT INTERVAL: 426 MS
QTC CALCULATION(BAZETT): 497 MS
QTC FREDERICIA: 472 MS
R AXIS: 0 DEGREES
T AXIS: 10 DEGREES
T OFFSET: 430 MS
VENTRICULAR RATE: 82 BPM

## 2025-02-01 PROCEDURE — RXMED WILLOW AMBULATORY MEDICATION CHARGE

## 2025-02-02 PROCEDURE — RXMED WILLOW AMBULATORY MEDICATION CHARGE

## 2025-02-04 ENCOUNTER — PHARMACY VISIT (OUTPATIENT)
Dept: PHARMACY | Facility: CLINIC | Age: 70
End: 2025-02-04
Payer: COMMERCIAL

## 2025-02-11 ENCOUNTER — TELEPHONE (OUTPATIENT)
Dept: CARDIOLOGY | Facility: HOSPITAL | Age: 70
End: 2025-02-11
Payer: COMMERCIAL

## 2025-02-11 ENCOUNTER — TELEPHONE (OUTPATIENT)
Dept: CARDIOLOGY | Facility: CLINIC | Age: 70
End: 2025-02-11
Payer: COMMERCIAL

## 2025-02-11 DIAGNOSIS — I50.9 HEART FAILURE, UNSPECIFIED HF CHRONICITY, UNSPECIFIED HEART FAILURE TYPE: ICD-10-CM

## 2025-02-11 NOTE — TELEPHONE ENCOUNTER
Copied from CRM [id number]. Topic: Information Request - Prescription Refill FAQ  >> Feb 11, 2025  1:14 PM Hiwot SHEPHERD wrote:  Patient is completely out of empagliflozin (Jardiance) 10 mg and need prescription refills sent

## 2025-02-12 DIAGNOSIS — I50.22 CHRONIC SYSTOLIC CONGESTIVE HEART FAILURE: Primary | ICD-10-CM

## 2025-02-13 NOTE — TELEPHONE ENCOUNTER
Spoke with patient and she cannot afford to pay for the Jardiance. She is currently not taking it. Specialty pharmacy referral was placed. I asked the patient to call us when there has been a final decision on whether she can afford the medication. I had advised her that if she can't that we will have to see her in the office for another POC.

## 2025-02-20 NOTE — PROGRESS NOTES
Physical Therapy    Physical Therapy Discharge    Patient Name: Sandy Smith  MRN: [id number]  Today's Date: 1/5/2024  Time Calculation  Start Time: 0815  Stop Time: 0855  Time Calculation (min): 40 min      Assessment:  Good improvement with balance and functional mobility, ROM fluctuating, no pain with PROM overpressure, pt will continue to work on HEP for knee ROM stretching and continue to be active. Goals mostly met, will continue with self management and Discharge today, pt agreed with plan.       Plan:  Discharge from PT.       Precautions: pt has defibrillator    OP PT Plan  PT Plan: Skilled PT (visit 9/1)  PT Frequency: 1 time per week  Duration: 10 weeks  Onset Date: 09/19/23  Certification Period Start Date: 10/16/23  Certification Period End Date: 01/14/24  Number of Treatments Authorized: MN  Rehab Potential: Good  Plan of Care Agreement: Patient    Current Problem  1. Left knee pain, unspecified chronicity        2. Sprain of collateral ligament of left knee, subsequent encounter        3. Displaced bicondylar fracture of left tibia, subsequent encounter for closed fracture with routine healing                Subjective:  Pt states doing well today, no pain. Working on HEP    General    General  Reason for Referral: closed fx L tibial plateau with routine healing, sprain of L knee collateral ligament, Thao Rosa lesion  Referred By: Elizabeth Abdullahi MD  Precautions  Precautions  Precautions Comment: hx of difrilator 2021  Pain       Objective   SLS today L 11-25sec  Knee MMT 4+/5 R, 4+/5 knee ext, 4/5 knee flex L  Hip abd L MMT 4/5  Ascending and descending with railing on stairs with good reciprocal gait   Good concentric and eccentric control with stairs today  L knee flex AROM 125deg, AAROM 128deg last session, 115-120deg today AROM   LEFS 66/80    Activity Tolerance:       Treatments:  Therapeutic exercises:  Recumbent bike L3 x 8min  Forward lean onto chair for knee flex ROM stretch 5sec  x5  SLS with intermittent HHA x 1 min L  Light HHA warrior III x10 R/L   8in step HHA, step up and over, forward and retro x10 R/L  8in step HHA, single leg squat with tap x10 L/R  Leg curl single leg 15# x 15 R/L, 25# x15 B   Leg ext 1 plate x15 R/L, 2 plates x15 B   SKTC with self overpressure 10 sec x 3  HEP: knee flex with overpressure (SKTC and with chair rocking) and forward and lateral step downs.    OP EDUCATION:       Goals:   To be met at time of discharge:---met except knee ROM  -- Decrease pain to __<1/10_.  -- Independent with HEP.  -- ROM: improve L Knee flex to 130deg or better without increased pain.   -- Strength: MMT L hip and knee 4+/5 with no increased pain.    -- Functional outcome: able to SLS 10sec L side without loss of balance or increased pain for safety.  Able to resume amb on level and stairs with no deviations, reciprocal gait with pain <1/10.  Able to tolerate WB exercises in clinic x25min without rest with pain <1/10.  Improve LEFS to >68/80.        [Fever] : fever [Malaise] : malaise [Nasal Discharge] : nasal discharge [Nasal Congestion] : nasal congestion [Cough] : cough [Negative] : Genitourinary

## 2025-02-23 ENCOUNTER — HOSPITAL ENCOUNTER (EMERGENCY)
Facility: HOSPITAL | Age: 70
Discharge: HOME | End: 2025-02-23
Payer: MEDICARE

## 2025-02-23 ENCOUNTER — CLINICAL SUPPORT (OUTPATIENT)
Dept: EMERGENCY MEDICINE | Facility: HOSPITAL | Age: 70
End: 2025-02-23
Payer: MEDICARE

## 2025-02-23 ENCOUNTER — APPOINTMENT (OUTPATIENT)
Dept: RADIOLOGY | Facility: HOSPITAL | Age: 70
End: 2025-02-23
Payer: MEDICARE

## 2025-02-23 VITALS
RESPIRATION RATE: 18 BRPM | SYSTOLIC BLOOD PRESSURE: 132 MMHG | TEMPERATURE: 98.7 F | WEIGHT: 122 LBS | DIASTOLIC BLOOD PRESSURE: 86 MMHG | HEIGHT: 59 IN | OXYGEN SATURATION: 96 % | BODY MASS INDEX: 24.6 KG/M2 | HEART RATE: 63 BPM

## 2025-02-23 DIAGNOSIS — M25.511 ACUTE PAIN OF BOTH SHOULDERS: ICD-10-CM

## 2025-02-23 DIAGNOSIS — M25.512 ACUTE PAIN OF BOTH SHOULDERS: ICD-10-CM

## 2025-02-23 DIAGNOSIS — M25.532 LEFT WRIST PAIN: ICD-10-CM
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
## 2025-02-23 DIAGNOSIS — S16.1XXA CERVICAL STRAIN, ACUTE, INITIAL ENCOUNTER: Primary | ICD-10-CM

## 2025-02-23 LAB
ATRIAL RATE: 64 BPM
P AXIS: -5 DEGREES
P OFFSET: 206 MS
P ONSET: 166 MS
PR INTERVAL: 172 MS
Q ONSET: 231 MS
QRS COUNT: 10 BEATS
QRS DURATION: 88 MS
QT INTERVAL: 444 MS
QTC CALCULATION(BAZETT): 458 MS
QTC FREDERICIA: 453 MS
R AXIS: 1 DEGREES
T AXIS: 39 DEGREES
T OFFSET: 453 MS
VENTRICULAR RATE: 64 BPM

## 2025-02-23 PROCEDURE — 2500000005 HC RX 250 GENERAL PHARMACY W/O HCPCS

## 2025-02-23 PROCEDURE — 73110 X-RAY EXAM OF WRIST: CPT | Mod: LT

## 2025-02-23 PROCEDURE — 73030 X-RAY EXAM OF SHOULDER: CPT | Mod: 50

## 2025-02-23 PROCEDURE — 73030 X-RAY EXAM OF SHOULDER: CPT | Mod: BILATERAL PROCEDURE | Performed by: RADIOLOGY

## 2025-02-23 PROCEDURE — 73110 X-RAY EXAM OF WRIST: CPT | Mod: LEFT SIDE | Performed by: RADIOLOGY

## 2025-02-23 PROCEDURE — 70450 CT HEAD/BRAIN W/O DYE: CPT

## 2025-02-23 PROCEDURE — 72125 CT NECK SPINE W/O DYE: CPT

## 2025-02-23 PROCEDURE — 70450 CT HEAD/BRAIN W/O DYE: CPT | Performed by: RADIOLOGY

## 2025-02-23 PROCEDURE — 99285 EMERGENCY DEPT VISIT HI MDM: CPT | Mod: 25

## 2025-02-23 PROCEDURE — 93005 ELECTROCARDIOGRAM TRACING: CPT

## 2025-02-23 PROCEDURE — 72125 CT NECK SPINE W/O DYE: CPT | Performed by: RADIOLOGY

## 2025-02-23 PROCEDURE — 99285 EMERGENCY DEPT VISIT HI MDM: CPT

## 2025-02-23 RX ORDER — LIDOCAINE 50 MG/G
1 PATCH TOPICAL DAILY
Qty: 20 PATCH | Refills: 0 | Status: SHIPPED | OUTPATIENT
Start: 2025-02-23

## 2025-02-23 RX ORDER — LIDOCAINE 560 MG/1
1 PATCH PERCUTANEOUS; TOPICAL; TRANSDERMAL ONCE
Status: DISCONTINUED | OUTPATIENT
Start: 2025-02-23 | End: 2025-02-23 | Stop reason: HOSPADM

## 2025-02-23 RX ADMIN — LIDOCAINE 1 PATCH: 4 PATCH TOPICAL at 14:53

## 2025-02-23 ASSESSMENT — PAIN SCALES - GENERAL: PAINLEVEL_OUTOF10: 4

## 2025-02-23 ASSESSMENT — PAIN DESCRIPTION - ORIENTATION: ORIENTATION: RIGHT

## 2025-02-23 ASSESSMENT — PAIN - FUNCTIONAL ASSESSMENT: PAIN_FUNCTIONAL_ASSESSMENT: 0-10

## 2025-02-23 ASSESSMENT — PAIN DESCRIPTION - LOCATION: LOCATION: SHOULDER

## 2025-02-23 ASSESSMENT — PAIN DESCRIPTION - PAIN TYPE: TYPE: ACUTE PAIN
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
## 2025-02-24 ENCOUNTER — APPOINTMENT (OUTPATIENT)
Facility: HOSPITAL | Age: 70
End: 2025-02-24
Payer: COMMERCIAL

## 2025-02-26 RX ORDER — LIDOCAINE AND MENTHOL 40; 40 MG/1; MG/1
1 PATCH TOPICAL EVERY 12 HOURS PRN
Qty: 10 PATCH | Refills: 0 | Status: SHIPPED | OUTPATIENT
Start: 2025-02-26

## 2025-03-05 PROCEDURE — RXMED WILLOW AMBULATORY MEDICATION CHARGE

## 2025-03-07 ENCOUNTER — HOSPITAL ENCOUNTER (OUTPATIENT)
Dept: CARDIOLOGY | Facility: CLINIC | Age: 70
Discharge: HOME | End: 2025-03-07
Payer: MEDICARE

## 2025-03-07 DIAGNOSIS — I42.0 DILATED CARDIOMYOPATHY (MULTI): ICD-10-CM

## 2025-03-07 PROCEDURE — 93296 REM INTERROG EVL PM/IDS: CPT

## 2025-03-07 PROCEDURE — 93295 DEV INTERROG REMOTE 1/2/MLT: CPT | Performed by: INTERNAL MEDICINE

## 2025-03-08 ENCOUNTER — PHARMACY VISIT (OUTPATIENT)
Dept: PHARMACY | Facility: CLINIC | Age: 70
End: 2025-03-08
Payer: COMMERCIAL

## 2025-03-10 RX ORDER — IBUPROFEN 800 MG/1
TABLET ORAL
COMMUNITY
Start: 2025-02-20

## 2025-03-12 ENCOUNTER — TELEPHONE (OUTPATIENT)
Facility: HOSPITAL | Age: 70
End: 2025-03-12

## 2025-03-12 ENCOUNTER — OFFICE VISIT (OUTPATIENT)
Dept: CARDIOLOGY | Facility: HOSPITAL | Age: 70
End: 2025-03-12
Payer: MEDICARE

## 2025-03-12 VITALS
OXYGEN SATURATION: 98 % | WEIGHT: 122 LBS | DIASTOLIC BLOOD PRESSURE: 76 MMHG | BODY MASS INDEX: 24.64 KG/M2 | SYSTOLIC BLOOD PRESSURE: 112 MMHG | HEART RATE: 84 BPM

## 2025-03-12 DIAGNOSIS — R07.9 CHEST PAIN, UNSPECIFIED TYPE: ICD-10-CM

## 2025-03-12 PROCEDURE — 1126F AMNT PAIN NOTED NONE PRSNT: CPT | Performed by: INTERNAL MEDICINE

## 2025-03-12 PROCEDURE — 99214 OFFICE O/P EST MOD 30 MIN: CPT | Performed by: INTERNAL MEDICINE

## 2025-03-12 PROCEDURE — RXMED WILLOW AMBULATORY MEDICATION CHARGE

## 2025-03-12 PROCEDURE — 3074F SYST BP LT 130 MM HG: CPT | Performed by: INTERNAL MEDICINE

## 2025-03-12 PROCEDURE — 1159F MED LIST DOCD IN RCRD: CPT | Performed by: INTERNAL MEDICINE

## 2025-03-12 PROCEDURE — 1160F RVW MEDS BY RX/DR IN RCRD: CPT | Performed by: INTERNAL MEDICINE

## 2025-03-12 PROCEDURE — 3078F DIAST BP <80 MM HG: CPT | Performed by: INTERNAL MEDICINE

## 2025-03-12 RX ORDER — FUROSEMIDE 20 MG/1
20 TABLET ORAL EVERY OTHER DAY
Qty: 15 TABLET | Refills: 3 | Status: SHIPPED | OUTPATIENT
Start: 2025-03-12 | End: 2026-03-12

## 2025-03-12 ASSESSMENT — ENCOUNTER SYMPTOMS
LOSS OF SENSATION IN FEET: 0
OCCASIONAL FEELINGS OF UNSTEADINESS: 0
DEPRESSION: 0

## 2025-03-12 ASSESSMENT — PAIN SCALES - GENERAL: PAINLEVEL_OUTOF10: 0-NO PAIN
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
## 2025-03-13 ENCOUNTER — HOSPITAL ENCOUNTER (OUTPATIENT)
Dept: PEDIATRIC CARDIOLOGY | Facility: HOSPITAL | Age: 70
Discharge: HOME | End: 2025-03-13
Payer: MEDICARE

## 2025-03-13 ENCOUNTER — HOSPITAL ENCOUNTER (OUTPATIENT)
Dept: CARDIOLOGY | Facility: HOSPITAL | Age: 70
Discharge: HOME | End: 2025-03-13
Payer: MEDICARE

## 2025-03-13 VITALS
WEIGHT: 122.36 LBS | DIASTOLIC BLOOD PRESSURE: 72 MMHG | BODY MASS INDEX: 25.68 KG/M2 | HEART RATE: 74 BPM | SYSTOLIC BLOOD PRESSURE: 109 MMHG | OXYGEN SATURATION: 97 % | HEIGHT: 58 IN

## 2025-03-13 DIAGNOSIS — I42.0 DILATED CARDIOMYOPATHY (MULTI): ICD-10-CM

## 2025-03-13 LAB — BODY SURFACE AREA: 1.51 M2

## 2025-03-13 PROCEDURE — 93283 PRGRMG EVAL IMPLANTABLE DFB: CPT

## 2025-03-13 NOTE — PROGRESS NOTES
Pharmacist Clinic: Cardiology Management    Sandy Smith is a 69 y.o. female was referred to Clinical Pharmacy Team for Heart Failure/Cardiomyopathy management.     Referring Provider: Lupillo Haddad MD    THIS IS A NEW PATIENT APPOINTMENT. PATIENT WILL BE ESTABLISHING CARE WITH CLINICAL PHARMACY.    Appointment was completed by Sandy who was reached at primary number.    Allergies Reviewed? Yes    Allergies   Allergen Reactions    Cephalexin Unknown    Lisinopril Angioedema       Past Medical History:   Diagnosis Date    Cardiomyopathy, unspecified     CHF (congestive heart failure)     CKD (chronic kidney disease)     Encounter for general adult medical examination without abnormal findings 01/31/2022    Encounter for initial preventive physical examination covered by Medicare    Hypertension     Otitis media, unspecified, right ear 01/31/2014    Acute right otitis media    Personal history of other diseases of the digestive system 07/21/2013    History of esophageal reflux    Personal history of other diseases of the musculoskeletal system and connective tissue 07/21/2013    History of low back pain    Personal history of other specified conditions 07/21/2013    History of angioedema       Current Outpatient Medications on File Prior to Visit   Medication Sig Dispense Refill    acetaminophen (TylenoL) 325 mg tablet Take 2 tablets (650 mg) by mouth every 6 hours if needed for mild pain (1 - 3) or fever (temp greater than 38.0 C). 30 tablet 0    benzocaine (HurriCaine) 20 % Use 1 spray in the mouth or throat 4 times a day as needed (Use 1 spray in throat up to 4 times a day as needed for sore throat). 57 g 0    calcium carbonate (Antacid, calcium carbonate,) 200 mg calcium chewable tablet Chew 1 tablet (500 mg) 2 times a day. 90 tablet 2    cholecalciferol, vitamin D3, 10 mcg (400 unit) capsule TAKE 1 CAPSULE Daily      cyclobenzaprine (Flexeril) 10 mg tablet Take 1 Tablet by mouth nightly at bedtime for  14 days 14 tablet 1    empagliflozin (Jardiance) 10 mg TAKE 1 TABLET BY MOUTH ONCE DAILY 90 tablet 2    fluticasone (Flonase) 50 mcg/actuation nasal spray Administer 1 spray into each nostril once daily. Shake gently. Before first use, prime pump. After use, clean tip and replace cap. 16 g 0    furosemide (Lasix) 20 mg tablet Take 1 tablet (20 mg) by mouth every other day. 15 tablet 3    hydrALAZINE (Apresoline) 10 mg tablet TAKE 1 TABLET BY MOUTH EVERY 8 HOURS 540 tablet 1    ibuprofen 800 mg tablet take 1 tablet by mouth every 6-8 hours as needed for pain      isosorbide dinitrate (Isordil) 20 mg tablet TAKE 1 TABLET BY MOUTH THREE TIMES A  tablet 2    magnesium citrate 100 mg capsule Take 200 mg (2 capsules) by mouth once daily. 90 capsule 2    methocarbamol (Robaxin) 500 mg tablet Take 1 tablet (500 mg) by mouth 4 times a day as needed for muscle spasms. 120 tablet 0    metoprolol succinate XL (Toprol-XL) 25 mg 24 hr tablet Take 1 tablet (25 mg) by mouth once daily. 90 tablet 3    NaCl-NaHCO3-hyaluron sod-aloe (NasogeL) 0.9 % spray gel USE AS DIRECTED.      omeprazole (PriLOSEC) 20 mg DR capsule Take 1 Capsule by mouth every morning before breakfast (Patient taking differently: Take 1 capsule (20 mg) by mouth once daily as needed.) 28 capsule 1    spironolactone (Aldactone) 25 mg tablet TAKE 1 TABLET (25 MG) BY MOUTH ONCE DAILY. 90 tablet 3    ciclopirox (Penlac) 8 % solution Apply topically once daily at bedtime. 6.6 mL 0    lidocaine (Lidoderm) 5 % patch Place 1 patch over 12 hours on the skin once daily. Remove & discard patch within 12 hours or as directed by MD. (Patient not taking: Reported on 3/14/2025) 20 patch 0    lidocaine-menthol 4-4 % adhesive patch,medicated Apply 1 patch topically every 12 hours if needed (for pain). (Patient not taking: Reported on 3/14/2025) 10 patch 0    [DISCONTINUED] amiodarone (Pacerone) 200 mg tablet Take 1 tablet (200 mg) by mouth once daily. 30 tablet 2     No  "current facility-administered medications on file prior to visit.         RELEVANT LAB RESULTS:  Lab Results   Component Value Date    BILITOT 0.8 01/13/2025    CALCIUM 10.0 01/13/2025    CO2 27 01/13/2025     01/13/2025    CREATININE 1.08 (H) 01/13/2025    GLUCOSE 84 01/13/2025    ALKPHOS 59 01/13/2025    K 4.0 01/13/2025    PROT 8.2 01/13/2025     01/13/2025    AST 29 01/13/2025    ALT 32 01/13/2025    BUN 16 01/13/2025    ANIONGAP 14 01/13/2025    MG 2.32 01/13/2025    PHOS 4.4 10/17/2023    ALBUMIN 4.3 01/13/2025    LIPASE 35 03/03/2024    GFRF 59 (A) 04/08/2023     Lab Results   Component Value Date    TRIG 98 07/21/2023    CHOL 191 07/21/2023    HDL 74.4 07/21/2023     No results found for: \"BMCBC\", \"CBCDIF\"     PHARMACEUTICAL ASSESSMENT:    MEDICATION RECONCILIATION    Was a medication reconciliation completed at this visit? Yes  Home Pharmacy Reviewed? Yes, describe: [facility]    Added:  - none  Changed:  - none  Removed:  - none    Drug Interactions? No    Medication Documentation Review Audit       Reviewed by Roshan Meek, PharmD (Pharmacist) on 03/14/25 at 1417      Medication Order Taking? Sig Documenting Provider Last Dose Status   acetaminophen (TylenoL) 325 mg tablet [id number] Yes Take 2 tablets (650 mg) by mouth every 6 hours if needed for mild pain (1 - 3) or fever (temp greater than 38.0 C). DERRICK Marquez-CNP  Active     Discontinued 09/12/23 0832   benzocaine (HurriCaine) 20 % [id number] Yes Use 1 spray in the mouth or throat 4 times a day as needed (Use 1 spray in throat up to 4 times a day as needed for sore throat). Silke Johnson PA-C  Active   calcium carbonate (Antacid, calcium carbonate,) 200 mg calcium chewable tablet [id number] Yes Chew 1 tablet (500 mg) 2 times a day. Olga Newsome MD  Active   cholecalciferol, vitamin D3, 10 mcg (400 unit) capsule [id number] Yes TAKE 1 CAPSULE Daily Historical Provider, MD  Active   ciclopirox (Penlac) 8 % solution " [id number]  Apply topically once daily at bedtime. Ana Patiño DPM   24 2359   cyclobenzaprine (Flexeril) 10 mg tablet [id number] Yes Take 1 Tablet by mouth nightly at bedtime for 14 days   Active   empagliflozin (Jardiance) 10 mg [id number] Yes TAKE 1 TABLET BY MOUTH ONCE DAILY Lupillo Haddad MD  Active   fluticasone (Flonase) 50 mcg/actuation nasal spray [id number] Yes Administer 1 spray into each nostril once daily. Shake gently. Before first use, prime pump. After use, clean tip and replace cap. Toya Moore MD  Active   furosemide (Lasix) 20 mg tablet [id number] Yes Take 1 tablet (20 mg) by mouth every other day. Lupillo Haddad MD  Active   hydrALAZINE (Apresoline) 10 mg tablet [id number] Yes TAKE 1 TABLET BY MOUTH EVERY 8 HOURS Lupillo Haddad MD  Active   ibuprofen 800 mg tablet [id number] Yes take 1 tablet by mouth every 6-8 hours as needed for pain Historical MD Stanley  Active   isosorbide dinitrate (Isordil) 20 mg tablet [id number] Yes TAKE 1 TABLET BY MOUTH THREE TIMES A DAY Olga Newsome MD  Active   lidocaine (Lidoderm) 5 % patch [id number]  Place 1 patch over 12 hours on the skin once daily. Remove & discard patch within 12 hours or as directed by MD.   Patient not taking: Reported on 3/14/2025    Lupe Bloom PA-C  Active   lidocaine-menthol 4-4 % adhesive patch,medicated [id number]  Apply 1 patch topically every 12 hours if needed (for pain).   Patient not taking: Reported on 3/14/2025    Lupe Bloom PA-C  Active   magnesium citrate 100 mg capsule [id number] Yes Take 200 mg (2 capsules) by mouth once daily. Olga Newsome MD  Active   methocarbamol (Robaxin) 500 mg tablet [id number] Yes Take 1 tablet (500 mg) by mouth 4 times a day as needed for muscle spasms. Olga Newsome MD  Active   metoprolol succinate XL (Toprol-XL) 25 mg 24 hr tablet [id number] Yes Take 1 tablet (25 mg) by mouth once daily. Lupillo Haddad MD  Active   [id number]-[id number]  sod-aloe (NasogeL) 0.9 % spray gel [id number] Yes USE AS DIRECTED. Historical Provider, MD  Active   omeprazole (PriLOSEC) 20 mg DR capsule [id number] Yes Take 1 Capsule by mouth every morning before breakfast   Patient taking differently: Take 1 capsule (20 mg) by mouth once daily as needed.      Active   spironolactone (Aldactone) 25 mg tablet [id number] Yes TAKE 1 TABLET (25 MG) BY MOUTH ONCE DAILY. Lupillo Haddad MD  Active                    DISEASE MANAGEMENT ASSESSMENT:     CHF ASSESSMENT     Symptom/Staging:  -Most recent ejection fraction: 50-55%  -NYHA Stage: II    Results for orders placed in visit on 08/16/23    Echocardiogram    Narrative  [facility], [address] [place] [address]  Tel [phone] and Fax [phone]    TRANSTHORACIC ECHOCARDIOGRAM REPORT      Patient Name:     MARY ANTON De La Vega Physician:  [id number] [address] CHAVES  Study Date:       8/16/2023       Referring           YEISON MCCALLUM  Physician:  MRN/PID:          [id number]        PCP:  Accession/Order#: [id number]    Department          [facility]  Location:           Invasive  YOB: 1955        Fellow:             [id number] Apryl Leroy MD  Gender:           F               Nurse:  Admit Date:                       Sonographer:        Verna SHORE  Admission Status: Outpatient      Additional Staff:  Height:           152.40 cm       CC Report to:  Weight:           50.35 kg        Study Type:         Echocardiogram  BSA:              1.45 m2  Blood Pressure: 118 /75 mmHg    Diagnosis/ICD: I50.20-Unspecified systolic (congestive) heart failure (CHF);  I42.9-Cardiomyopathy, unspecified; I47.29-Ventricular  tachycardia, other  Indication:    CM, CHF, VT  Procedure/CPT: Echo Complete w Full Doppler-[id number]    Patient History:  Pertinent History: N-O CAD, NICM, HFrEF, Nonsustained VT s/p ICD, HTN, HLD,  Family hx CAD/cardiac disorder.    Study Detail: The following Echo studies  were performed: 2D, M-Mode, Doppler and  color flow.      PHYSICIAN INTERPRETATION:  Left Ventricle: The left ventricular systolic function is low normal, with an estimated ejection fraction of 50-55%. Estimated left ventricular mass is normal. There are no regional wall motion abnormalities. The left ventricular cavity size is normal. Abnormal (paradoxical) septal motion, consistent with RV pacemaker. Spectral Doppler shows an impaired relaxation pattern of left ventricular diastolic filling.  Left Atrium: The left atrium is normal in size.  Right Ventricle: The right ventricle is normal in size. There is reduced right ventricular systolic function. A device is visualized in the right ventricle.  Right Atrium: The right atrium is normal in size. There is a device visualized in the right atrium.  Aortic Valve: The aortic valve is trileaflet. There is no evidence of aortic valve regurgitation. The peak instantaneous gradient of the aortic valve is 4.4 mmHg.  Mitral Valve: The mitral valve is normal in structure. There is moderate mitral annular calcification. There is trace mitral valve regurgitation.  Tricuspid Valve: The tricuspid valve is structurally normal. There is trace tricuspid regurgitation. The Doppler estimated RVSP is within normal limits at 19.1 mmHg. RVSP may be underestimated due to incomplete TR CW Doppler envelope.  Pulmonic Valve: The pulmonic valve is not well visualized. There is trace pulmonic valve regurgitation.  Pericardium: There is a small pericardial effusion.  Aorta: The aortic root is normal. There is no dilatation of the aortic root.  Systemic Veins: The inferior vena cava was not well visualized.  In comparison to the previous echocardiogram(s): Compared with study from 12/16/2020,. Compared to previous echo on 12/18/2020, the ejection fraction had recovered from 25-30% to 50-55% on this study.      CONCLUSIONS:  1. Left ventricular systolic function is low normal with a 50-55%  estimated ejection fraction.  2. Abnormal septal motion consistent with RV pacemaker.  3. Spectral Doppler shows an impaired relaxation pattern of left ventricular diastolic filling.  4. There is reduced right ventricular systolic function.  5. There is moderate mitral annular calcification.  6. RVSP within normal limits.  7. Compared to previous echo on 12/18/2020, the ejection fraction had recovered from 25-30% to 50-55% on this study.    QUANTITATIVE DATA SUMMARY:  2D MEASUREMENTS:  Normal Ranges:  Ao Root d:     3.00 cm   (2.0-3.7cm)  IVSd:          1.10 cm   (0.6-1.1cm)  LVPWd:         0.98 cm   (0.6-1.1cm)  LVIDd:         4.12 cm   (3.9-5.9cm)  LVIDs:         3.31 cm  LV Mass Index: 97.3 g/m2  LV % FS        19.8 %    LA VOLUME:  Normal Ranges:  LA Vol A4C:        36.5 ml    (22+/-6mL/m2)  LA Vol A2C:        40.8 ml  LA Vol BP:         39.8 ml  LA Vol Index A4C:  25.1ml/m2  LA Vol Index A2C:  28.1 ml/m2  LA Vol Index BP:   27.4 ml/m2  LA Area A4C:       14.2 cm2  LA Area A2C:       15.5 cm2  LA Major Axis A4C: 4.7 cm  LA Major Axis A2C: 5.0 cm  LA Vol A4C:        34.0 ml  LA Vol A2C:        39.6 ml    RA VOLUME BY A/L METHOD:  Normal Ranges:  RA Vol A4C:        19.6 ml    (8.3-19.5ml)  RA Vol Index A4C:  13.5 ml/m2  RA Area A4C:       9.6 cm2  RA Major Axis A4C: 4.0 cm    AORTA MEASUREMENTS:  Normal Ranges:  Ao Sinus, d: 2.70 cm (2.1-3.5cm)  Asc Ao, d:   2.80 cm (2.1-3.4cm)    LV SYSTOLIC FUNCTION BY 2D PLANIMETRY (MOD):  Normal Ranges:  EF-A4C View: 53.2 % (>=55%)  EF-A2C View: 55.9 %  EF-Biplane:  54.9 %    LV DIASTOLIC FUNCTION:  Normal Ranges:  MV Peak E:    0.50 m/s (0.7-1.2 m/s)  MV Peak A:    0.72 m/s (0.42-0.7 m/s)  E/A Ratio:    0.69     (1.0-2.2)  MV e'         0.03 m/s (>8.0)  MV lateral e' 0.04 m/s  MV medial e'  0.02 m/s  E/e' Ratio:   16.62    (<8.0)  MV DT:        225 msec (150-240 msec)    MITRAL VALVE:  Normal Ranges:  MV DT: 225 msec (150-240msec)    AORTIC VALVE:  Normal Ranges:  AoV Vmax:       1.05 m/s (<=1.7m/s)  AoV Peak P.4 mmHg (<20mmHg)  LVOT Max Kishan:  0.67 m/s (<=1.1m/s)  LVOT VTI:      14.27 cm  LVOT Diameter: 1.96 cm  (1.8-2.4cm)  AoV Area,Vmax: 1.92 cm2 (2.5-4.5cm2)      RIGHT VENTRICLE:  RV Basal 2.50 cm  RV Mid   2.00 cm  RV Major 5.8 cm  TAPSE:   13.6 mm  RV s'    0.08 m/s    TRICUSPID VALVE/RVSP:  Normal Ranges:  Peak TR Velocity: 2.00 m/s  RV Syst Pressure: 19.1 mmHg (< 30mmHg)    AORTA:  Asc Ao Diam 2.82 cm      [id number] Elba Daley MD  Electronically signed on 2023 at 6:56:46 PM         Final      Guideline-Directed Medical Therapy:  -ARNI: No   -If no, then ACEi/ARB?: No  -Beta Blocker: Yes, describe: metoprolol succinate 25mg daily  -MRA: Yes, describe: spironolactone 25mg daily  -SGLT2i: Yes, describe: Jardiance 10mg daily    Secondary Prevention:  -The 10-year ASCVD risk score (Tirso DK, et al., 2019) is: 18.2%    Values used to calculate the score:      Age: 69 years      Sex: Female      Is Non- : Yes      Diabetic: Yes      Tobacco smoker: No      Systolic Blood Pressure: 109 mmHg      Is BP treated: Yes      HDL Cholesterol: 74.4 mg/dL      Total Cholesterol: 191 mg/dL   -Aspirin 81mg? no  -Statin?: No  -HTN?: Yes, describe: controlled    CURRENT PHARMACOTHERAPY:   Furosemide 20mg every other day  Jardiance 10mg daily  Isosorbide dinitrate 20mg TID  Hydralazine 10mg TID  Spironolactone 25mg daily  Metoprolol succinate 25mg daily    Affordability: high cost with name brand medications  Adherence/Compliance: reports adherence to all medications  Adverse Effects: none reported.    Monitoring Weights at Home: Yes  Home Weight Recordings: 122lbs    Past In Office Weight Readings:   Wt Readings from Last 6 Encounters:   25 55.5 kg (122 lb 5.8 oz)   25 55.3 kg (122 lb)   25 55.3 kg (122 lb)   25 55.3 kg (122 lb)   24 54.4 kg (120 lb)   24 54.8 kg (120 lb 14.4 oz)       Monitoring Blood Pressure at Home: Yes  Home BP  Recordings: 100s-110s/60s    Past In Office BP Readings:   BP Readings from Last 6 Encounters:   03/13/25 109/72   03/12/25 112/76   02/23/25 132/86   01/13/25 125/83   12/26/24 100/67   12/12/24 100/69       HEALTH MANAGEMENT    Maintaining fluid restriction (<2 L/day): N/A  Edema/swelling: No  Shortness of breath: No  Trouble sleeping/lying down: No  Dry/hacking cough: No  Recent Hospitalizations: No    EDUCATION/COUNSELING:   - Counseled patient on MOA, expectations, duration of therapy, contraindications, administration, and monitoring parameters  - Counseled patient on lifestyle modifications that can decrease your risk of having complications (smoking cessation, losing weight, daily weights, vaccines)  - Counseled patient on fluid intake and weight management. Recommended to not consume more than 2 liters of fliuids per day. If they have gained more than 2-3 pounds within a 24 hours period (or 5 pounds in a week), contact their cardiologist  - Answered all patient questions and concerns       DISCUSSION/NOTES:   Reports adherence to all medications at today's visit.  No edema, shortness of breath, or chest tightness noted at today's visit.  BP currently controlled based on home readings.    ASSESSMENT:    Assessment/Plan   Problem List Items Addressed This Visit       CHF (congestive heart failure)     Tolerating 3 GDMT medications at this time.  Angioedema when using lisinopril  No dose adjustments needed based on kidney and liver functions.         Relevant Orders    Referral to Clinical Pharmacy      Patient Assistance Program (PAP)    Application for program to be submitted for the following medications: [facility] Permanent Address: [facility]   Prescription Insurance:   Yes   Members of Household: 1   Files Taxes: No       Patient will be email financial information to pharmacist directly at [email or web address].    Patient verbally reports monthly or yearly income which is less  than 400% federal poverty level    Patient aware this process may take up to 6 weeks.     If approved medication must be filled through [facility] and MEDICATION WILL BE MAILED TO PATIENT.         RECOMMENDATIONS/PLAN:    CONTINUE  Furosemide 20mg every other day  Jardiance 10mg daily  Isosorbide dinitrate 20mg TID  Hydralazine 10mg TID  Spironolactone 25mg daily  Metoprolol succinate 25mg daily    Last Appnt with Referring Provider: 3/12/25  Next Appnt with Referring Provider: 9/12/25  Clinical Pharmacist follow up: 4/18/25  Type of Encounter: Stephane Meek PharmD    Verbal consent to manage patient's drug therapy was obtained from the patient . They were informed they may decline to participate or withdraw from participation in pharmacy services at any time.    Continue all meds under the continuation of care with the referring provider and clinical pharmacy team.

## 2025-03-14 ENCOUNTER — APPOINTMENT (OUTPATIENT)
Dept: PHARMACY | Facility: HOSPITAL | Age: 70
End: 2025-03-14
Payer: COMMERCIAL

## 2025-03-14 DIAGNOSIS — I50.22 CHRONIC SYSTOLIC CONGESTIVE HEART FAILURE: ICD-10-CM

## 2025-03-14 NOTE — ASSESSMENT & PLAN NOTE
Tolerating 3 GDMT medications at this time.  Angioedema when using lisinopril  No dose adjustments needed based on kidney and liver functions.

## 2025-03-15 ENCOUNTER — PHARMACY VISIT (OUTPATIENT)
Dept: PHARMACY | Facility: CLINIC | Age: 70
End: 2025-03-15
Payer: COMMERCIAL

## 2025-03-27 ENCOUNTER — HOSPITAL ENCOUNTER (OUTPATIENT)
Dept: CARDIOLOGY | Facility: CLINIC | Age: 70
Discharge: HOME | End: 2025-03-27
Payer: MEDICARE

## 2025-03-27 DIAGNOSIS — I42.9 CARDIOMYOPATHY, UNSPECIFIED TYPE (MULTI): ICD-10-CM

## 2025-03-27 DIAGNOSIS — I42.0 DILATED CARDIOMYOPATHY (MULTI): ICD-10-CM

## 2025-03-27 DIAGNOSIS — Z95.810 PRESENCE OF AUTOMATIC (IMPLANTABLE) CARDIAC DEFIBRILLATOR: Primary | ICD-10-CM

## 2025-04-08 PROCEDURE — RXMED WILLOW AMBULATORY MEDICATION CHARGE

## 2025-04-12 ENCOUNTER — PHARMACY VISIT (OUTPATIENT)
Dept: PHARMACY | Facility: CLINIC | Age: 70
End: 2025-04-12
Payer: COMMERCIAL

## 2025-04-16 ENCOUNTER — APPOINTMENT (OUTPATIENT)
Dept: CARDIOLOGY | Facility: CLINIC | Age: 70
End: 2025-04-16
Payer: MEDICARE

## 2025-04-17 NOTE — PROGRESS NOTES
"  Pharmacist Clinic: Cardiology Management    Sandy Smith is a 69 y.o. female was referred to Clinical Pharmacy Team for Heart Failure management.     Referring Provider: Lupillo Haddad MD    THIS IS A FOLLOW UP PATIENT APPOINTMENT. AT LAST VISIT ON 3/14/25 WITH PHARMACIST (Roshan Meek).    Appointment was completed by Sandy Smith who was reached at primary number.    REVIEW OF PAST APPNT (IF APPLICABLE):   During last appointment sandy was screened for [facility] PAP, but the clinical pharmacy team is waiting on proof of income to submit her application.    Allergies[1]    Medical History[2]    Medications Ordered Prior to Encounter[3]      RELEVANT LAB RESULTS:  Lab Results   Component Value Date    BILITOT 0.8 01/13/2025    CALCIUM 10.0 01/13/2025    CO2 27 01/13/2025     01/13/2025    CREATININE 1.08 (H) 01/13/2025    GLUCOSE 84 01/13/2025    ALKPHOS 59 01/13/2025    K 4.0 01/13/2025    PROT 8.2 01/13/2025     01/13/2025    AST 29 01/13/2025    ALT 32 01/13/2025    BUN 16 01/13/2025    ANIONGAP 14 01/13/2025    MG 2.32 01/13/2025    PHOS 4.4 10/17/2023    ALBUMIN 4.3 01/13/2025    LIPASE 35 03/03/2024    GFRF 59 (A) 04/08/2023     Lab Results   Component Value Date    TRIG 98 07/21/2023    CHOL 191 07/21/2023    HDL 74.4 07/21/2023     No results found for: \"BMCBC\", \"CBCDIF\"     PHARMACEUTICAL ASSESSMENT:    MEDICATION RECONCILIATION    Was a medication reconciliation completed at this visit? No    Drug Interactions? No    Medication Documentation Review Audit       Reviewed by Roshan Meek, PharmD (Pharmacist) on 03/14/25 at 1417      Medication Order Taking? Sig Documenting Provider Last Dose Status   acetaminophen (TylenoL) 325 mg tablet [id number] Yes Take 2 tablets (650 mg) by mouth every 6 hours if needed for mild pain (1 - 3) or fever (temp greater than 38.0 C). Jamee Mccarthy, APRN-CNP  Active     Discontinued 09/12/23 0832   benzocaine (HurriCaine) 20 % [id number] Yes Use 1 spray " in the mouth or throat 4 times a day as needed (Use 1 spray in throat up to 4 times a day as needed for sore throat). Silke Johnson PA-C  Active   calcium carbonate (Antacid, calcium carbonate,) 200 mg calcium chewable tablet [id number] Yes Chew 1 tablet (500 mg) 2 times a day. Olga Newsome MD  Active   cholecalciferol, vitamin D3, 10 mcg (400 unit) capsule [id number] Yes TAKE 1 CAPSULE Daily Historical Provider, MD  Active   ciclopirox (Penlac) 8 % solution [id number]  Apply topically once daily at bedtime. Ana Patiño DPM   24 [id number]   cyclobenzaprine (Flexeril) 10 mg tablet [id number] Yes Take 1 Tablet by mouth nightly at bedtime for 14 days   Active   empagliflozin (Jardiance) 10 mg [id number] Yes TAKE 1 TABLET BY MOUTH ONCE DAILY Lupillo Haddad MD  Active   fluticasone (Flonase) 50 mcg/actuation nasal spray [id number] Yes Administer 1 spray into each nostril once daily. Shake gently. Before first use, prime pump. After use, clean tip and replace cap. Toya Moore MD  Active   furosemide (Lasix) 20 mg tablet [id number] Yes Take 1 tablet (20 mg) by mouth every other day. Lupillo Haddad MD  Active   hydrALAZINE (Apresoline) 10 mg tablet [id number] Yes TAKE 1 TABLET BY MOUTH EVERY 8 HOURS Lupillo Haddad MD  Active   ibuprofen 800 mg tablet [id number] Yes take 1 tablet by mouth every 6-8 hours as needed for pain Historical Provider, MD  Active   isosorbide dinitrate (Isordil) 20 mg tablet [id number] Yes TAKE 1 TABLET BY MOUTH THREE TIMES A DAY Olga Newsome MD  Active   lidocaine (Lidoderm) 5 % patch [id number]  Place 1 patch over 12 hours on the skin once daily. Remove & discard patch within 12 hours or as directed by MD.   Patient not taking: Reported on 3/14/2025    Lupe Bloom PA-C  Active   lidocaine-menthol 4-4 % adhesive patch,medicated [id number]  Apply 1 patch topically every 12 hours if needed (for pain).   Patient not taking: Reported on 3/14/2025    Lupe  KYLEE Bloom  Active   magnesium citrate 100 mg capsule [id number] Yes Take 200 mg (2 capsules) by mouth once daily. Olga Newsome MD  Active   methocarbamol (Robaxin) 500 mg tablet [id number] Yes Take 1 tablet (500 mg) by mouth 4 times a day as needed for muscle spasms. Olga Newsome MD  Active   metoprolol succinate XL (Toprol-XL) 25 mg 24 hr tablet [id number] Yes Take 1 tablet (25 mg) by mouth once daily. Lupillo Haddad MD  Active   NaCl-NaHCO3-hyaluron sod-aloe (NasogeL) 0.9 % spray gel [id number] Yes USE AS DIRECTED. Red Angel MD  Active   omeprazole (PriLOSEC) 20 mg DR capsule [id number] Yes Take 1 Capsule by mouth every morning before breakfast   Patient taking differently: Take 1 capsule (20 mg) by mouth once daily as needed.      Active   spironolactone (Aldactone) 25 mg tablet [id number] Yes TAKE 1 TABLET (25 MG) BY MOUTH ONCE DAILY. Lupillo Haddad MD  Active                    DISEASE MANAGEMENT ASSESSMENT:     CHF ASSESSMENT     Symptom/Staging:  -Most recent ejection fraction: 50-55%  -NYHA Stage: II    Results for orders placed in visit on 08/16/23    Echocardiogram    Narrative  [facility], [address] [place] [address]  Tel [phone] and Fax [phone]    TRANSTHORACIC ECHOCARDIOGRAM REPORT      Patient Name:     MARY De La Vega Physician:  [id number] Elba CHAVES  Study Date:       8/16/2023       Referring           YEISON MCCALLUM  Physician:  MRN/PID:          [id number]        PCP:  Accession/Order#: [id number]    [facility] Non  Location:           Invasive  YOB: 1955        Fellow:             [id number] Apryl eLroy MD  Gender:           F               Nurse:  Admit Date:                       Sonographer:        Verna SHORE  Admission Status: Outpatient      Additional Staff:  Height:           152.40 cm       CC Report to:  Weight:           50.35 kg        Study Type:          Echocardiogram  BSA:              1.45 m2  Blood Pressure: 118 /75 mmHg    Diagnosis/ICD: I50.20-Unspecified systolic (congestive) heart failure (CHF);  I42.9-Cardiomyopathy, unspecified; I47.29-Ventricular  tachycardia, other  Indication:    CM, CHF, VT  Procedure/CPT: Echo Complete w Full Doppler-[id number]    Patient History:  Pertinent History: N-O CAD, NICM, HFrEF, Nonsustained VT s/p ICD, HTN, HLD,  Family hx CAD/cardiac disorder.    Study Detail: The following Echo studies were performed: 2D, M-Mode, Doppler and  color flow.      PHYSICIAN INTERPRETATION:  Left Ventricle: The left ventricular systolic function is low normal, with an estimated ejection fraction of 50-55%. Estimated left ventricular mass is normal. There are no regional wall motion abnormalities. The left ventricular cavity size is normal. Abnormal (paradoxical) septal motion, consistent with RV pacemaker. Spectral Doppler shows an impaired relaxation pattern of left ventricular diastolic filling.  Left Atrium: The left atrium is normal in size.  Right Ventricle: The right ventricle is normal in size. There is reduced right ventricular systolic function. A device is visualized in the right ventricle.  Right Atrium: The right atrium is normal in size. There is a device visualized in the right atrium.  Aortic Valve: The aortic valve is trileaflet. There is no evidence of aortic valve regurgitation. The peak instantaneous gradient of the aortic valve is 4.4 mmHg.  Mitral Valve: The mitral valve is normal in structure. There is moderate mitral annular calcification. There is trace mitral valve regurgitation.  Tricuspid Valve: The tricuspid valve is structurally normal. There is trace tricuspid regurgitation. The Doppler estimated RVSP is within normal limits at 19.1 mmHg. RVSP may be underestimated due to incomplete TR CW Doppler envelope.  Pulmonic Valve: The pulmonic valve is not well visualized. There is trace pulmonic valve  regurgitation.  Pericardium: There is a small pericardial effusion.  Aorta: The aortic root is normal. There is no dilatation of the aortic root.  Systemic Veins: The inferior vena cava was not well visualized.  In comparison to the previous echocardiogram(s): Compared with study from 12/16/2020,. Compared to previous echo on 12/18/2020, the ejection fraction had recovered from 25-30% to 50-55% on this study.      CONCLUSIONS:  1. Left ventricular systolic function is low normal with a 50-55% estimated ejection fraction.  2. Abnormal septal motion consistent with RV pacemaker.  3. Spectral Doppler shows an impaired relaxation pattern of left ventricular diastolic filling.  4. There is reduced right ventricular systolic function.  5. There is moderate mitral annular calcification.  6. RVSP within normal limits.  7. Compared to previous echo on 12/18/2020, the ejection fraction had recovered from 25-30% to 50-55% on this study.    QUANTITATIVE DATA SUMMARY:  2D MEASUREMENTS:  Normal Ranges:  Ao Root d:     3.00 cm   (2.0-3.7cm)  IVSd:          1.10 cm   (0.6-1.1cm)  LVPWd:         0.98 cm   (0.6-1.1cm)  LVIDd:         4.12 cm   (3.9-5.9cm)  LVIDs:         3.31 cm  LV Mass Index: 97.3 g/m2  LV % FS        19.8 %    LA VOLUME:  Normal Ranges:  LA Vol A4C:        36.5 ml    (22+/-6mL/m2)  LA Vol A2C:        40.8 ml  LA Vol BP:         39.8 ml  LA Vol Index A4C:  25.1ml/m2  LA Vol Index A2C:  28.1 ml/m2  LA Vol Index BP:   27.4 ml/m2  LA Area A4C:       14.2 cm2  LA Area A2C:       15.5 cm2  LA Major Axis A4C: 4.7 cm  LA Major Axis A2C: 5.0 cm  LA Vol A4C:        34.0 ml  LA Vol A2C:        39.6 ml    RA VOLUME BY A/L METHOD:  Normal Ranges:  RA Vol A4C:        19.6 ml    (8.3-19.5ml)  RA Vol Index A4C:  13.5 ml/m2  RA Area A4C:       9.6 cm2  RA Major Axis A4C: 4.0 cm    AORTA MEASUREMENTS:  Normal Ranges:  Ao Sinus, d: 2.70 cm (2.1-3.5cm)  Asc Ao, d:   2.80 cm (2.1-3.4cm)    LV SYSTOLIC FUNCTION BY 2D PLANIMETRY  (MOD):  Normal Ranges:  EF-A4C View: 53.2 % (>=55%)  EF-A2C View: 55.9 %  EF-Biplane:  54.9 %    LV DIASTOLIC FUNCTION:  Normal Ranges:  MV Peak E:    0.50 m/s (0.7-1.2 m/s)  MV Peak A:    0.72 m/s (0.42-0.7 m/s)  E/A Ratio:    0.69     (1.0-2.2)  MV e'         0.03 m/s (>8.0)  MV lateral e' 0.04 m/s  MV medial e'  0.02 m/s  E/e' Ratio:   16.62    (<8.0)  MV DT:        225 msec (150-240 msec)    MITRAL VALVE:  Normal Ranges:  MV DT: 225 msec (150-240msec)    AORTIC VALVE:  Normal Ranges:  AoV Vmax:      1.05 m/s (<=1.7m/s)  AoV Peak P.4 mmHg (<20mmHg)  LVOT Max Kishan:  0.67 m/s (<=1.1m/s)  LVOT VTI:      14.27 cm  LVOT Diameter: 1.96 cm  (1.8-2.4cm)  AoV Area,Vmax: 1.92 cm2 (2.5-4.5cm2)      RIGHT VENTRICLE:  RV Basal 2.50 cm  RV Mid   2.00 cm  RV Major 5.8 cm  TAPSE:   13.6 mm  RV s'    0.08 m/s    TRICUSPID VALVE/RVSP:  Normal Ranges:  Peak TR Velocity: 2.00 m/s  RV Syst Pressure: 19.1 mmHg (< 30mmHg)    AORTA:  Asc Ao Diam 2.82 cm      [id number] Elba Daley MD  Electronically signed on 2023 at 6:56:46 PM         Final       Guideline-Directed Medical Therapy:  -ARNI: No              -If no, then ACEi/ARB?: No - angioedema  -Beta Blocker: Yes, describe: metoprolol succinate 25mg daily  -MRA: Yes, describe: spironolactone 25mg daily  -SGLT2i: Yes, describe: Jardiance 10mg daily    Secondary Prevention:  -The 10-year ASCVD risk score (Tirso DK, et al., 2019) is: 18.2%    Values used to calculate the score:      Age: 69 years      Sex: Female      Is Non- : Yes      Diabetic: Yes      Tobacco smoker: No      Systolic Blood Pressure: 109 mmHg      Is BP treated: Yes      HDL Cholesterol: 74.4 mg/dL      Total Cholesterol: 191 mg/dL   -Aspirin 81mg? no  -Statin?: No  -HTN?: Yes, describe: controlled     CURRENT PHARMACOTHERAPY:   Furosemide 20mg every other day  Jardiance 10mg daily  Isosorbide dinitrate 20mg TID  Hydralazine 10mg TID  Spironolactone 25mg daily  Metoprolol succinate 25mg  daily     Affordability: high cost with name brand medications  Adherence/Compliance: reports adherence to all medications - noted she recently paid for a fill of Jardiance to continue to use the medication while we wait for her proof of income.  Adverse Effects: none reported.    Monitoring Weights at Home: Yes  Home Weight Recordings: ~122lbs    Past In Office Weight Readings:   Wt Readings from Last 6 Encounters:   03/13/25 55.5 kg (122 lb 5.8 oz)   03/12/25 55.3 kg (122 lb)   02/23/25 55.3 kg (122 lb)   01/13/25 55.3 kg (122 lb)   12/26/24 54.4 kg (120 lb)   12/12/24 54.8 kg (120 lb 14.4 oz)       Monitoring Blood Pressure at Home: Yes  Home BP Recordings: 100s-110s/60s-70s    Past In Office BP Readings:   BP Readings from Last 6 Encounters:   03/13/25 109/72   03/12/25 112/76   02/23/25 132/86   01/13/25 125/83   12/26/24 100/67   12/12/24 100/69       HEALTH MANAGEMENT    Maintaining fluid restriction (<2 L/day): N/A  Edema/swelling: No  Shortness of breath: No  Trouble sleeping/lying down: No  Dry/hacking cough: No  Recent Hospitalizations: No    EDUCATION/COUNSELING:   - Counseled patient on MOA, expectations, duration of therapy, contraindications, administration, and monitoring parameters  - Counseled patient on lifestyle modifications that can decrease your risk of having complications (smoking cessation, losing weight, daily weights, vaccines)  - Counseled patient on fluid intake and weight management. Recommended to not consume more than 2 liters of fliuids per day. If they have gained more than 2-3 pounds within a 24 hours period (or 5 pounds in a week), contact their cardiologist  - Answered all patient questions and concerns       DISCUSSION/NOTES:   During today's appointment Sandy noted that she is continuing to have stable blood pressure readings in goal range.   No worsening signs of CHF noted.  Reports adherence to Jardiance.  Understands to send in proof of income for  PAP  application.    ASSESSMENT:    Assessment/Plan   Problem List Items Addressed This Visit       CHF (congestive heart failure)    Tolerating 3 GDMT medications at this time.  Unable to use ACE/ARB due to allergy  No dose adjustments needed based on kidney and liver functions.              RECOMMENDATIONS/PLAN:    CONTINUE  Furosemide 20mg every other day  Jardiance 10mg daily  Isosorbide dinitrate 20mg TID  Hydralazine 10mg TID  Spironolactone 25mg daily  Metoprolol succinate 25mg daily    Last Appnt with Referring Provider: 3/12/25  Next Appnt with Referring Provider: 9/12/25  Clinical Pharmacist follow up: pending  PAP approval  VAF/Application Expiration: No  Type of Encounter: Stephane Meek PharmD    Verbal consent to manage patient's drug therapy was obtained from the patient . They were informed they may decline to participate or withdraw from participation in pharmacy services at any time.    Continue all meds under the continuation of care with the referring provider and clinical pharmacy team.            [1]   Allergies  Allergen Reactions    Cephalexin Unknown    Lisinopril Angioedema   [2]   Past Medical History:  Diagnosis Date    Cardiomyopathy, unspecified     CHF (congestive heart failure)     CKD (chronic kidney disease)     Encounter for general adult medical examination without abnormal findings 01/31/2022    Encounter for initial preventive physical examination covered by Medicare    Hypertension     Otitis media, unspecified, right ear 01/31/2014    Acute right otitis media    Personal history of other diseases of the digestive system 07/21/2013    History of esophageal reflux    Personal history of other diseases of the musculoskeletal system and connective tissue 07/21/2013    History of low back pain    Personal history of other specified conditions 07/21/2013    History of angioedema   [3]   Current Outpatient Medications on File Prior to Visit   Medication Sig Dispense  Refill    acetaminophen (TylenoL) 325 mg tablet Take 2 tablets (650 mg) by mouth every 6 hours if needed for mild pain (1 - 3) or fever (temp greater than 38.0 C). 30 tablet 0    benzocaine (HurriCaine) 20 % Use 1 spray in the mouth or throat 4 times a day as needed (Use 1 spray in throat up to 4 times a day as needed for sore throat). 57 g 0    calcium carbonate (Antacid, calcium carbonate,) 500 mg (200 mg elemental) chewable tablet Chew 1 tablet (500 mg) 2 times a day. 90 tablet 2    cholecalciferol, vitamin D3, 10 mcg (400 unit) capsule TAKE 1 CAPSULE Daily      ciclopirox (Penlac) 8 % solution Apply topically once daily at bedtime. 6.6 mL 0    cyclobenzaprine (Flexeril) 10 mg tablet Take 1 Tablet by mouth nightly at bedtime for 14 days 14 tablet 1    empagliflozin (Jardiance) 10 mg TAKE 1 TABLET BY MOUTH ONCE DAILY 90 tablet 2    fluticasone (Flonase) 50 mcg/actuation nasal spray Administer 1 spray into each nostril once daily. Shake gently. Before first use, prime pump. After use, clean tip and replace cap. 16 g 0    furosemide (Lasix) 20 mg tablet Take 1 tablet (20 mg) by mouth every other day. 15 tablet 3    hydrALAZINE (Apresoline) 10 mg tablet TAKE 1 TABLET BY MOUTH EVERY 8 HOURS 540 tablet 1    ibuprofen 800 mg tablet take 1 tablet by mouth every 6-8 hours as needed for pain      isosorbide dinitrate (Isordil) 20 mg tablet TAKE 1 TABLET BY MOUTH THREE TIMES A  tablet 2    lidocaine (Lidoderm) 5 % patch Place 1 patch over 12 hours on the skin once daily. Remove & discard patch within 12 hours or as directed by MD. (Patient not taking: Reported on 3/14/2025) 20 patch 0    lidocaine-menthol 4-4 % adhesive patch,medicated Apply 1 patch topically every 12 hours if needed (for pain). (Patient not taking: Reported on 3/14/2025) 10 patch 0    magnesium citrate 100 mg capsule Take 200 mg (2 capsules) by mouth once daily. 90 capsule 2    methocarbamol (Robaxin) 500 mg tablet Take 1 tablet (500 mg) by mouth 4  times a day as needed for muscle spasms. 120 tablet 0    metoprolol succinate XL (Toprol-XL) 25 mg 24 hr tablet Take 1 tablet (25 mg) by mouth once daily. 90 tablet 3    NaCl-NaHCO3-hyaluron sod-aloe (NasogeL) 0.9 % spray gel USE AS DIRECTED.      omeprazole (PriLOSEC) 20 mg DR capsule Take 1 Capsule by mouth every morning before breakfast (Patient taking differently: Take 1 capsule (20 mg) by mouth once daily as needed.) 28 capsule 1    spironolactone (Aldactone) 25 mg tablet TAKE 1 TABLET (25 MG) BY MOUTH ONCE DAILY. 90 tablet 3    [DISCONTINUED] amiodarone (Pacerone) 200 mg tablet Take 1 tablet (200 mg) by mouth once daily. 30 tablet 2     No current facility-administered medications on file prior to visit.

## 2025-04-18 ENCOUNTER — APPOINTMENT (OUTPATIENT)
Dept: PHARMACY | Facility: HOSPITAL | Age: 70
End: 2025-04-18
Payer: MEDICARE

## 2025-04-18 DIAGNOSIS — I50.22 CHRONIC SYSTOLIC CONGESTIVE HEART FAILURE: ICD-10-CM

## 2025-04-18 NOTE — ASSESSMENT & PLAN NOTE
Tolerating 3 GDMT medications at this time.  Unable to use ACE/ARB due to allergy  No dose adjustments needed based on kidney and liver functions.

## 2025-04-18 NOTE — Clinical Note
Reminded patient what forms are needed for  PAP true. Reports adherence to all medications. Recently paid for another fill of Jardiance. Hope to be approved for her future refills.

## 2025-04-23 DIAGNOSIS — I50.9 HEART FAILURE, UNSPECIFIED HF CHRONICITY, UNSPECIFIED HEART FAILURE TYPE: ICD-10-CM

## 2025-04-30 ENCOUNTER — TELEPHONE (OUTPATIENT)
Dept: PHARMACY | Facility: HOSPITAL | Age: 70
End: 2025-04-30
Payer: MEDICARE

## 2025-04-30 DIAGNOSIS — I50.22 CHRONIC SYSTOLIC CONGESTIVE HEART FAILURE: Primary | ICD-10-CM

## 2025-04-30 PROCEDURE — RXMED WILLOW AMBULATORY MEDICATION CHARGE

## 2025-04-30 NOTE — TELEPHONE ENCOUNTER
Patient Assistance Program Approval:     We are pleased to inform you that your application for assistance has been approved.     This approval is valid through 4/30/26 as long as the following criteria continue to be satisfied:     Your medication (Jardiance) remains covered under your current insurance plan.   Your prescriber does not discontinue therapy.   You do not seek reimbursement from any other private or government-funded programs for the  medication.    Under this program, the pharmacy will first bill your insurance plan for your indemnified specified medication. The [employer] Assistance Fund will then offset your copay balance, so that your out-of pocket expense for your specialty medication will be $0.00.    Roshan Meek, PharmD

## 2025-05-03 ENCOUNTER — PHARMACY VISIT (OUTPATIENT)
Dept: PHARMACY | Facility: CLINIC | Age: 70
End: 2025-05-03
Payer: COMMERCIAL

## 2025-05-03 PROCEDURE — RXMED WILLOW AMBULATORY MEDICATION CHARGE

## 2025-05-05 ENCOUNTER — OFFICE VISIT (OUTPATIENT)
Facility: HOSPITAL | Age: 70
End: 2025-05-05
Payer: MEDICARE

## 2025-05-05 VITALS
TEMPERATURE: 98.1 F | HEIGHT: 59 IN | OXYGEN SATURATION: 97 % | BODY MASS INDEX: 25.2 KG/M2 | DIASTOLIC BLOOD PRESSURE: 61 MMHG | SYSTOLIC BLOOD PRESSURE: 97 MMHG | WEIGHT: 125 LBS | HEART RATE: 84 BPM | RESPIRATION RATE: 18 BRPM

## 2025-05-05 DIAGNOSIS — R07.89 DISCOMFORT IN CHEST: ICD-10-CM

## 2025-05-05 DIAGNOSIS — J32.8 OTHER CHRONIC SINUSITIS: Primary | ICD-10-CM

## 2025-05-05 PROCEDURE — 99213 OFFICE O/P EST LOW 20 MIN: CPT

## 2025-05-05 PROCEDURE — 1159F MED LIST DOCD IN RCRD: CPT

## 2025-05-05 PROCEDURE — 3008F BODY MASS INDEX DOCD: CPT

## 2025-05-05 PROCEDURE — 99213 OFFICE O/P EST LOW 20 MIN: CPT | Mod: GE

## 2025-05-05 PROCEDURE — 3078F DIAST BP <80 MM HG: CPT

## 2025-05-05 PROCEDURE — G2211 COMPLEX E/M VISIT ADD ON: HCPCS

## 2025-05-05 PROCEDURE — 3074F SYST BP LT 130 MM HG: CPT

## 2025-05-05 PROCEDURE — 1036F TOBACCO NON-USER: CPT

## 2025-05-05 PROCEDURE — RXMED WILLOW AMBULATORY MEDICATION CHARGE

## 2025-05-05 RX ORDER — IPRATROPIUM BROMIDE 21 UG/1
2 SPRAY, METERED NASAL EVERY 12 HOURS
Qty: 30 ML | Refills: 12 | Status: SHIPPED | OUTPATIENT
Start: 2025-05-05 | End: 2026-05-05

## 2025-05-05 NOTE — PROGRESS NOTES
"Patient ID: Sandy Smith is a 69 y.o. female with PMH of HTN, pacemaker/defibrillator, and CHF who presents for Follow-up and Med Refill.    Subjective     Med Refill    Patient reports discomfort to her left breast at the area of her defibrillator. She was seen by her cardiologist on 3/12/2025 without any concerns or intervention. Patient reports doing much better at this time and is planning on leaving the country in July.     Review of Systems   All other systems reviewed and are negative.    Medical History[1]  Surgical History[2]  Family History[3]  Cephalexin and Lisinopril   Social History     Tobacco Use    Smoking status: Former     Current packs/day: 0.00     Types: Cigarettes     Quit date: 1970     Years since quittin.3     Passive exposure: Past    Smokeless tobacco: Never   Substance Use Topics    Alcohol use: Yes     Comment: occasional       Medications Ordered Prior to Encounter[4]     Objective   Vitals: BP 97/61 (BP Location: Right arm, Patient Position: Sitting, BP Cuff Size: Adult)   Pulse 84   Temp 36.7 °C (98.1 °F) (Temporal)   Resp 18   Ht 1.499 m (4' 11\")   Wt 56.7 kg (125 lb)   SpO2 97%   BMI 25.25 kg/m²      Physical Exam  Vitals reviewed.   Constitutional:       General: She is not in acute distress.     Appearance: Normal appearance.   HENT:      Head: Atraumatic.      Mouth/Throat:      Mouth: Mucous membranes are moist.      Pharynx: Oropharynx is clear.   Eyes:      Extraocular Movements: Extraocular movements intact.      Conjunctiva/sclera: Conjunctivae normal.      Pupils: Pupils are equal, round, and reactive to light.   Cardiovascular:      Rate and Rhythm: Normal rate and regular rhythm.      Pulses: Normal pulses.      Heart sounds: Normal heart sounds. No murmur heard.     No gallop.   Pulmonary:      Effort: No respiratory distress.      Breath sounds: Normal breath sounds. No wheezing or rales.   Abdominal:      General: Bowel sounds are normal.      " Palpations: There is no mass.      Tenderness: There is no abdominal tenderness. There is no guarding.   Skin:     General: Skin is warm and dry.   Neurological:      Mental Status: She is alert.       Assessment/Plan   Problem List Items Addressed This Visit          Cardiac and Vasculature    Discomfort in chest    - Patient with left chest discomfort proximal to her defibrillator. She has been evaluated by cardiology w/o concerning findings   - Discussed utilization of different supporting bras including sports bras             ENT    Chronic sinusitis - Primary    Relevant Medications    sodium chloride (Ocean) 0.65 % nasal spray    ipratropium (Atrovent) 21 mcg (0.03 %) nasal spray       Patient d/w attending Dr. Yan    RTC in 6 months, or earlier as needed.    NIA Newsome MD   Family Medicine, PGY2        [1]   Past Medical History:  Diagnosis Date    Cardiomyopathy, unspecified     CHF (congestive heart failure)     CKD (chronic kidney disease)     Encounter for general adult medical examination without abnormal findings 01/31/2022    Encounter for initial preventive physical examination covered by Medicare    Hypertension     Otitis media, unspecified, right ear 01/31/2014    Acute right otitis media    Personal history of other diseases of the digestive system 07/21/2013    History of esophageal reflux    Personal history of other diseases of the musculoskeletal system and connective tissue 07/21/2013    History of low back pain    Personal history of other specified conditions 07/21/2013    History of angioedema   [2]   Past Surgical History:  Procedure Laterality Date    BONE GRAFT Left     CARDIAC DEFIBRILLATOR PLACEMENT      COLONOSCOPY  08/23/2013    Colonoscopy (Fiberoptic)    CT ANGIO CORONARY ART WITH HEARTFLOW IF SCORE >30%  11/20/2018    [place] HEART [place]ON[place] [facility] [place] [facility] [place]    CT ANGIO NECK  01/29/2022    CT NECK ANGIO W AND WO IV CONTRAST 1/29/2022 [facility]  CLINICAL LEGACY    KNEE SURGERY Left     OTHER SURGICAL HISTORY  02/08/2021    Pacemaker insertion    OTHER SURGICAL HISTORY  11/08/2018    Umbilical hernia repair    OTHER SURGICAL HISTORY  12/05/2013    Wrist Surgery   [3]   Family History  Problem Relation Name Age of Onset    Aneurysm Mother      Heart disease Mother      Hyperlipidemia Mother      Hypertension Mother      Heart disease Father      Hypertension Father      Other (cardiac disorder) Sister      Coronary artery disease Sister      Other (cardiac disorder) Brother      Coronary artery disease Brother     [4]   Current Outpatient Medications on File Prior to Visit   Medication Sig Dispense Refill    acetaminophen (TylenoL) 325 mg tablet Take 2 tablets (650 mg) by mouth every 6 hours if needed for mild pain (1 - 3) or fever (temp greater than 38.0 C). 30 tablet 0    benzocaine (HurriCaine) 20 % Use 1 spray in the mouth or throat 4 times a day as needed (Use 1 spray in throat up to 4 times a day as needed for sore throat). 57 g 0    calcium carbonate (Antacid, calcium carbonate,) 500 mg (200 mg elemental) chewable tablet Chew 1 tablet (500 mg) 2 times a day. 90 tablet 2    cholecalciferol, vitamin D3, 10 mcg (400 unit) capsule TAKE 1 CAPSULE Daily      ciclopirox (Penlac) 8 % solution Apply topically once daily at bedtime. 6.6 mL 0    cyclobenzaprine (Flexeril) 10 mg tablet Take 1 Tablet by mouth nightly at bedtime for 14 days 14 tablet 1    empagliflozin (Jardiance) 10 mg tablet TAKE 1 TABLET BY MOUTH ONCE DAILY 90 tablet 3    furosemide (Lasix) 20 mg tablet Take 1 tablet (20 mg) by mouth every other day. 15 tablet 3    hydrALAZINE (Apresoline) 10 mg tablet TAKE 1 TABLET BY MOUTH EVERY 8 HOURS 540 tablet 1    ibuprofen 800 mg tablet take 1 tablet by mouth every 6-8 hours as needed for pain      isosorbide dinitrate (Isordil) 20 mg tablet TAKE 1 TABLET BY MOUTH THREE TIMES A  tablet 2    magnesium citrate 100 mg capsule Take 200 mg (2 capsules)  by mouth once daily. 90 capsule 2    methocarbamol (Robaxin) 500 mg tablet Take 1 tablet (500 mg) by mouth 4 times a day as needed for muscle spasms. 120 tablet 0    metoprolol succinate XL (Toprol-XL) 25 mg 24 hr tablet Take 1 tablet (25 mg) by mouth once daily. 90 tablet 3    NaCl-NaHCO3-hyaluron sod-aloe (NasogeL) 0.9 % spray gel USE AS DIRECTED.      omeprazole (PriLOSEC) 20 mg DR capsule Take 1 Capsule by mouth every morning before breakfast (Patient taking differently: Take 1 capsule (20 mg) by mouth once daily as needed.) 28 capsule 1    spironolactone (Aldactone) 25 mg tablet TAKE 1 TABLET (25 MG) BY MOUTH ONCE DAILY. 90 tablet 3    [DISCONTINUED] amiodarone (Pacerone) 200 mg tablet Take 1 tablet (200 mg) by mouth once daily. 30 tablet 2    [DISCONTINUED] fluticasone (Flonase) 50 mcg/actuation nasal spray Administer 1 spray into each nostril once daily. Shake gently. Before first use, prime pump. After use, clean tip and replace cap. 16 g 0    [DISCONTINUED] lidocaine (Lidoderm) 5 % patch Place 1 patch over 12 hours on the skin once daily. Remove & discard patch within 12 hours or as directed by MD. (Patient not taking: Reported on 3/14/2025) 20 patch 0    [DISCONTINUED] lidocaine-menthol 4-4 % adhesive patch,medicated Apply 1 patch topically every 12 hours if needed (for pain). (Patient not taking: Reported on 3/14/2025) 10 patch 0     No current facility-administered medications on file prior to visit.

## 2025-05-05 NOTE — ASSESSMENT & PLAN NOTE
- Patient with left chest discomfort proximal to her defibrillator. She has been evaluated by cardiology w/o concerning findings   - Discussed utilization of different supporting bras including sports bras

## 2025-05-07 ENCOUNTER — PHARMACY VISIT (OUTPATIENT)
Dept: PHARMACY | Facility: CLINIC | Age: 70
End: 2025-05-07
Payer: COMMERCIAL

## 2025-05-09 ENCOUNTER — TELEPHONE (OUTPATIENT)
Dept: GASTROENTEROLOGY | Facility: HOSPITAL | Age: 70
End: 2025-05-09
Payer: MEDICARE

## 2025-05-09 NOTE — TELEPHONE ENCOUNTER
Informed patient that Dr. Carr recommended her next colonoscopy to be done in 5 years and it has only been 4 years. Cancelled procedure and advised to wait another year.

## 2025-05-15 ENCOUNTER — APPOINTMENT (OUTPATIENT)
Dept: GASTROENTEROLOGY | Facility: HOSPITAL | Age: 70
End: 2025-05-15
Payer: MEDICARE

## 2025-05-20 DIAGNOSIS — M81.0 OSTEOPOROSIS WITHOUT CURRENT PATHOLOGICAL FRACTURE, UNSPECIFIED OSTEOPOROSIS TYPE: ICD-10-CM

## 2025-05-22 RX ORDER — CALCIUM CARBONATE 200(500)MG
1 TABLET,CHEWABLE ORAL 2 TIMES DAILY
Qty: 90 TABLET | Refills: 2 | Status: SHIPPED | OUTPATIENT
Start: 2025-05-22

## 2025-05-30 ENCOUNTER — APPOINTMENT (OUTPATIENT)
Dept: CARDIOLOGY | Facility: CLINIC | Age: 70
End: 2025-05-30
Payer: MEDICARE

## 2025-06-23 ENCOUNTER — HOSPITAL ENCOUNTER (OUTPATIENT)
Dept: CARDIOLOGY | Facility: CLINIC | Age: 70
Discharge: HOME | End: 2025-06-23
Payer: MEDICARE

## 2025-06-23 DIAGNOSIS — I42.0 DILATED CARDIOMYOPATHY (MULTI): ICD-10-CM

## 2025-06-23 PROCEDURE — 93296 REM INTERROG EVL PM/IDS: CPT

## 2025-06-24 ENCOUNTER — OFFICE VISIT (OUTPATIENT)
Facility: HOSPITAL | Age: 70
End: 2025-06-24
Payer: MEDICARE

## 2025-06-24 VITALS
SYSTOLIC BLOOD PRESSURE: 103 MMHG | HEART RATE: 79 BPM | RESPIRATION RATE: 18 BRPM | WEIGHT: 124.8 LBS | BODY MASS INDEX: 25.16 KG/M2 | HEIGHT: 59 IN | OXYGEN SATURATION: 99 % | TEMPERATURE: 98.2 F | DIASTOLIC BLOOD PRESSURE: 67 MMHG

## 2025-06-24 DIAGNOSIS — I42.8 OTHER CARDIOMYOPATHY: ICD-10-CM

## 2025-06-24 DIAGNOSIS — J32.8 OTHER CHRONIC SINUSITIS: ICD-10-CM

## 2025-06-24 DIAGNOSIS — M81.0 OSTEOPOROSIS WITHOUT CURRENT PATHOLOGICAL FRACTURE, UNSPECIFIED OSTEOPOROSIS TYPE: ICD-10-CM

## 2025-06-24 DIAGNOSIS — S46.911A MUSCLE STRAIN OF RIGHT SHOULDER REGION, INITIAL ENCOUNTER: Primary | ICD-10-CM

## 2025-06-24 DIAGNOSIS — L60.8 TOENAIL DEFORMITY: ICD-10-CM

## 2025-06-24 DIAGNOSIS — I50.32 HEART FAILURE WITH IMPROVED EJECTION FRACTION (HFIMPEF): ICD-10-CM

## 2025-06-24 PROBLEM — I50.30 (HFPEF) HEART FAILURE WITH PRESERVED EJECTION FRACTION: Status: ACTIVE | Noted: 2024-04-25

## 2025-06-24 PROBLEM — I47.29 NON-SUSTAINED VENTRICULAR TACHYCARDIA (MULTI): Status: RESOLVED | Noted: 2023-02-20 | Resolved: 2025-06-24

## 2025-06-24 PROCEDURE — 99213 OFFICE O/P EST LOW 20 MIN: CPT | Mod: GE

## 2025-06-24 PROCEDURE — 1125F AMNT PAIN NOTED PAIN PRSNT: CPT

## 2025-06-24 PROCEDURE — 1036F TOBACCO NON-USER: CPT

## 2025-06-24 PROCEDURE — 3074F SYST BP LT 130 MM HG: CPT

## 2025-06-24 PROCEDURE — 3008F BODY MASS INDEX DOCD: CPT

## 2025-06-24 PROCEDURE — 99213 OFFICE O/P EST LOW 20 MIN: CPT

## 2025-06-24 PROCEDURE — 3078F DIAST BP <80 MM HG: CPT

## 2025-06-24 PROCEDURE — 1159F MED LIST DOCD IN RCRD: CPT

## 2025-06-24 RX ORDER — METOPROLOL SUCCINATE 25 MG/1
25 TABLET, EXTENDED RELEASE ORAL DAILY
Qty: 90 TABLET | Refills: 3 | Status: SHIPPED | OUTPATIENT
Start: 2025-06-24 | End: 2026-06-24

## 2025-06-24 RX ORDER — IPRATROPIUM BROMIDE 21 UG/1
2 SPRAY, METERED NASAL EVERY 12 HOURS
Qty: 30 ML | Refills: 12 | Status: SHIPPED | OUTPATIENT
Start: 2025-06-24 | End: 2026-06-24

## 2025-06-24 RX ORDER — SPIRONOLACTONE 25 MG/1
25 TABLET ORAL
Qty: 90 TABLET | Refills: 3 | Status: SHIPPED | OUTPATIENT
Start: 2025-06-24

## 2025-06-24 RX ORDER — CALCIUM CARBONATE 200(500)MG
1 TABLET,CHEWABLE ORAL 2 TIMES DAILY
Qty: 90 TABLET | Refills: 3 | Status: SHIPPED | OUTPATIENT
Start: 2025-06-24

## 2025-06-24 RX ORDER — FUROSEMIDE 20 MG/1
20 TABLET ORAL EVERY OTHER DAY
Qty: 15 TABLET | Refills: 3 | Status: SHIPPED | OUTPATIENT
Start: 2025-06-24 | End: 2026-06-24

## 2025-06-24 RX ORDER — CYCLOBENZAPRINE HCL 10 MG
10 TABLET ORAL 3 TIMES DAILY PRN
Qty: 14 TABLET | Refills: 1 | Status: SHIPPED | OUTPATIENT
Start: 2025-06-24

## 2025-06-24 RX ORDER — CICLOPIROX 80 MG/ML
SOLUTION TOPICAL NIGHTLY
Qty: 6.6 ML | Refills: 3 | Status: SHIPPED | OUTPATIENT
Start: 2025-06-24 | End: 2025-07-24

## 2025-06-24 RX ORDER — ISOSORBIDE DINITRATE 20 MG/1
20 TABLET ORAL 3 TIMES DAILY
Qty: 270 TABLET | Refills: 2 | Status: SHIPPED | OUTPATIENT
Start: 2025-06-24 | End: 2026-06-23

## 2025-06-24 ASSESSMENT — PAIN SCALES - GENERAL: PAINLEVEL_OUTOF10: 4

## 2025-06-24 NOTE — ASSESSMENT & PLAN NOTE
- Pt has pain to right SCM with FROM that is relieved with lidocaine patch and cream and analgesic. No red flag signs on exam   - Discussed to continue with swimming and using analgesics and cream prn also provided with handouts for home stretches   - Sent muscle relaxant to pt's pharmacy

## 2025-06-24 NOTE — PROGRESS NOTES
"Patient ID: Sandy Smith is a 69 y.o. female who presents for Follow-up and Shoulder Pain.    Subjective     HPI  Patient reports Right neck and shoulder pain for approximately 1 week that last about 30 mins until she uses lidocaine or tylenol. She is planning on going to [place] in July and has been very active by swimming twice weekly for 8 weeks and ballroom dancing twice weekly. She denies HA, N/v, blurry vision, decreased ROM     Review of Systems   All other systems reviewed and are negative.    Medical History[1]  Surgical History[2]  Family History[3]  Cephalexin and Lisinopril   Social History     Tobacco Use    Smoking status: Former     Current packs/day: 0.00     Types: Cigarettes     Quit date: 1970     Years since quittin.5     Passive exposure: Past    Smokeless tobacco: Never   Substance Use Topics    Alcohol use: Yes     Comment: occasional       Medications Ordered Prior to Encounter[4]     Objective   Vitals: /67 (BP Location: Right arm, Patient Position: Sitting, BP Cuff Size: Adult)   Pulse 79   Temp 36.8 °C (98.2 °F) (Temporal)   Resp 18   Ht 1.499 m (4' 11\")   Wt 56.6 kg (124 lb 12.8 oz)   SpO2 99%   BMI 25.21 kg/m²      Physical Exam  Vitals reviewed.   Constitutional:       General: She is not in acute distress.     Appearance: Normal appearance.   HENT:      Head: Atraumatic.      Mouth/Throat:      Mouth: Mucous membranes are moist.      Pharynx: Oropharynx is clear.   Eyes:      Extraocular Movements: Extraocular movements intact.      Conjunctiva/sclera: Conjunctivae normal.      Pupils: Pupils are equal, round, and reactive to light.   Cardiovascular:      Rate and Rhythm: Normal rate and regular rhythm.      Pulses: Normal pulses.      Heart sounds: Normal heart sounds.   Pulmonary:      Effort: Pulmonary effort is normal.      Breath sounds: Normal breath sounds.   Abdominal:      General: Bowel sounds are normal.      Palpations: There is no mass.      " Tenderness: There is no abdominal tenderness.   Musculoskeletal:         General: No signs of injury. Normal range of motion.      Cervical back: Normal range of motion and neck supple. No tenderness.   Skin:     General: Skin is warm and dry.      Capillary Refill: Capillary refill takes less than 2 seconds.   Neurological:      Mental Status: She is alert.         Assessment/Plan   Problem List Items Addressed This Visit          Cardiac and Vasculature    Cardiomyopathy    Relevant Medications    isosorbide dinitrate (Isordil) 20 mg tablet    metoprolol succinate XL (Toprol-XL) 25 mg 24 hr tablet       ENT    Chronic sinusitis    Relevant Medications    ipratropium (Atrovent) 21 mcg (0.03 %) nasal spray    sodium chloride (Ocean) 0.65 % nasal spray       Musculoskeletal and Injuries    Muscle strain of right shoulder region - Primary    - Pt has pain to right SCM with FROM that is relieved with lidocaine patch and cream and analgesic. No red flag signs on exam   - Discussed to continue with swimming and using analgesics and cream prn also provided with handouts for home stretches   - Sent muscle relaxant to pt's pharmacy          Relevant Medications    cyclobenzaprine (Flexeril) 10 mg tablet       Skin    Toenail deformity    Relevant Medications    ciclopirox (Penlac) 8 % solution     Other Visit Diagnoses         Osteoporosis without current pathological fracture, unspecified osteoporosis type        Relevant Medications    calcium carbonate (Antacid, calcium carbonate,) 500 mg (200 mg elemental) chewable tablet      Heart failure, unspecified HF chronicity, unspecified heart failure type        Relevant Medications    isosorbide dinitrate (Isordil) 20 mg tablet    metoprolol succinate XL (Toprol-XL) 25 mg 24 hr tablet      HFrEF (heart failure with reduced ejection fraction)        Relevant Medications    empagliflozin (Jardiance) 10 mg tablet    furosemide (Lasix) 20 mg tablet    isosorbide dinitrate  (Isordil) 20 mg tablet    metoprolol succinate XL (Toprol-XL) 25 mg 24 hr tablet    spironolactone (Aldactone) 25 mg tablet          Patient d/w attending Dr. Isaiah PRAKASH in 6 month    H. Angelika Newsome MD   Family Medicine, PGY2        [1]   Past Medical History:  Diagnosis Date    Cardiomyopathy, unspecified     CHF (congestive heart failure)     CKD (chronic kidney disease)     Encounter for general adult medical examination without abnormal findings 01/31/2022    Encounter for initial preventive physical examination covered by Medicare    Hypertension     Otitis media, unspecified, right ear 01/31/2014    Acute right otitis media    Personal history of other diseases of the digestive system 07/21/2013    History of esophageal reflux    Personal history of other diseases of the musculoskeletal system and connective tissue 07/21/2013    History of low back pain    Personal history of other specified conditions 07/21/2013    History of angioedema   [2]   Past Surgical History:  Procedure Laterality Date    BONE GRAFT Left     CARDIAC DEFIBRILLATOR PLACEMENT      COLONOSCOPY  08/23/2013    Colonoscopy (Fiberoptic)    CT ANGIO CORONARY ART WITH HEARTFLOW IF SCORE >30%  11/20/2018    CT HEART CORONARY ANGIOGRAM [place] [facility] [place]    CT ANGIO NECK  01/29/2022    CT NECK ANGIO W AND WO IV CONTRAST 1/29/2022 [facility] CLINICAL LEGACY    KNEE SURGERY Left     OTHER SURGICAL HISTORY  02/08/2021    Pacemaker insertion    OTHER SURGICAL HISTORY  11/08/2018    Umbilical hernia repair    OTHER SURGICAL HISTORY  12/05/2013    Wrist Surgery   [3]   Family History  Problem Relation Name Age of Onset    Aneurysm Mother      Heart disease Mother      Hyperlipidemia Mother      Hypertension Mother      Heart disease Father      Hypertension Father      Other (cardiac disorder) Sister      Coronary artery disease Sister      Other (cardiac disorder) Brother      Coronary artery disease Brother     [4]   Current  Outpatient Medications on File Prior to Visit   Medication Sig Dispense Refill    acetaminophen (TylenoL) 325 mg tablet Take 2 tablets (650 mg) by mouth every 6 hours if needed for mild pain (1 - 3) or fever (temp greater than 38.0 C). 30 tablet 0    cholecalciferol, vitamin D3, 10 mcg (400 unit) capsule TAKE 1 CAPSULE Daily      hydrALAZINE (Apresoline) 10 mg tablet TAKE 1 TABLET BY MOUTH EVERY 8 HOURS 540 tablet 1    ibuprofen 800 mg tablet take 1 tablet by mouth every 6-8 hours as needed for pain      NaCl-NaHCO3-hyaluron sod-aloe (NasogeL) 0.9 % spray gel USE AS DIRECTED.      [DISCONTINUED] amiodarone (Pacerone) 200 mg tablet Take 1 tablet (200 mg) by mouth once daily. 30 tablet 2    [DISCONTINUED] benzocaine (HurriCaine) 20 % Use 1 spray in the mouth or throat 4 times a day as needed (Use 1 spray in throat up to 4 times a day as needed for sore throat). 57 g 0    [DISCONTINUED] calcium carbonate (Antacid, calcium carbonate,) 500 mg (200 mg elemental) chewable tablet Chew 1 tablet 2 times a day. 90 tablet 2    [DISCONTINUED] ciclopirox (Penlac) 8 % solution Apply topically once daily at bedtime. 6.6 mL 0    [DISCONTINUED] cyclobenzaprine (Flexeril) 10 mg tablet Take 1 Tablet by mouth nightly at bedtime for 14 days 14 tablet 1    [DISCONTINUED] empagliflozin (Jardiance) 10 mg tablet TAKE 1 TABLET BY MOUTH ONCE DAILY 90 tablet 3    [DISCONTINUED] furosemide (Lasix) 20 mg tablet Take 1 tablet (20 mg) by mouth every other day. 15 tablet 3    [DISCONTINUED] ipratropium (Atrovent) 21 mcg (0.03 %) nasal spray Administer 2 sprays into each nostril every 12 hours. 30 mL 12    [DISCONTINUED] isosorbide dinitrate (Isordil) 20 mg tablet TAKE 1 TABLET BY MOUTH THREE TIMES A  tablet 2    [DISCONTINUED] magnesium citrate 100 mg capsule Take 200 mg (2 capsules) by mouth once daily. 90 capsule 2    [DISCONTINUED] methocarbamol (Robaxin) 500 mg tablet Take 1 tablet (500 mg) by mouth 4 times a day as needed for muscle  spasms. 120 tablet 0    [DISCONTINUED] metoprolol succinate XL (Toprol-XL) 25 mg 24 hr tablet Take 1 tablet (25 mg) by mouth once daily. 90 tablet 3    [DISCONTINUED] omeprazole (PriLOSEC) 20 mg DR capsule Take 1 Capsule by mouth every morning before breakfast (Patient taking differently: Take 1 capsule (20 mg) by mouth once daily as needed.) 28 capsule 1    [DISCONTINUED] sodium chloride (Ocean) 0.65 % nasal spray Administer 1 spray into each nostril if needed for congestion. 44 mL 12    [DISCONTINUED] spironolactone (Aldactone) 25 mg tablet TAKE 1 TABLET (25 MG) BY MOUTH ONCE DAILY. 90 tablet 3     No current facility-administered medications on file prior to visit.

## 2025-06-25 ENCOUNTER — TELEPHONE (OUTPATIENT)
Dept: SCHEDULING | Age: 70
End: 2025-06-25

## 2025-07-08 ENCOUNTER — APPOINTMENT (OUTPATIENT)
Dept: CARDIOLOGY | Facility: CLINIC | Age: 70
End: 2025-07-08
Payer: MEDICARE

## 2025-07-11 ENCOUNTER — PHARMACY VISIT (OUTPATIENT)
Dept: PHARMACY | Facility: CLINIC | Age: 70
End: 2025-07-11
Payer: COMMERCIAL

## 2025-07-11 PROCEDURE — RXMED WILLOW AMBULATORY MEDICATION CHARGE

## 2025-07-15 ENCOUNTER — OFFICE VISIT (OUTPATIENT)
Dept: CARDIOLOGY | Facility: HOSPITAL | Age: 70
End: 2025-07-15
Payer: MEDICARE

## 2025-07-15 VITALS
WEIGHT: 124.6 LBS | HEART RATE: 74 BPM | DIASTOLIC BLOOD PRESSURE: 67 MMHG | OXYGEN SATURATION: 98 % | SYSTOLIC BLOOD PRESSURE: 103 MMHG | HEIGHT: 59 IN | BODY MASS INDEX: 25.12 KG/M2

## 2025-07-15 DIAGNOSIS — R00.2 PALPITATIONS: ICD-10-CM

## 2025-07-15 DIAGNOSIS — Z95.810 PRESENCE OF AUTOMATIC CARDIOVERTER/DEFIBRILLATOR (AICD): Primary | ICD-10-CM

## 2025-07-15 PROCEDURE — 93005 ELECTROCARDIOGRAM TRACING: CPT | Performed by: NURSE PRACTITIONER

## 2025-07-15 PROCEDURE — 3074F SYST BP LT 130 MM HG: CPT | Performed by: NURSE PRACTITIONER

## 2025-07-15 PROCEDURE — 1125F AMNT PAIN NOTED PAIN PRSNT: CPT | Performed by: NURSE PRACTITIONER

## 2025-07-15 PROCEDURE — 3078F DIAST BP <80 MM HG: CPT | Performed by: NURSE PRACTITIONER

## 2025-07-15 PROCEDURE — 1160F RVW MEDS BY RX/DR IN RCRD: CPT | Performed by: NURSE PRACTITIONER

## 2025-07-15 PROCEDURE — 99212 OFFICE O/P EST SF 10 MIN: CPT

## 2025-07-15 PROCEDURE — 1159F MED LIST DOCD IN RCRD: CPT | Performed by: NURSE PRACTITIONER

## 2025-07-15 PROCEDURE — 99214 OFFICE O/P EST MOD 30 MIN: CPT | Performed by: NURSE PRACTITIONER

## 2025-07-15 PROCEDURE — 3008F BODY MASS INDEX DOCD: CPT | Performed by: NURSE PRACTITIONER

## 2025-07-15 RX ORDER — NAPROXEN 500 MG/1
500 TABLET ORAL 2 TIMES DAILY
COMMUNITY
Start: 2025-06-20

## 2025-07-15 ASSESSMENT — ENCOUNTER SYMPTOMS
LOSS OF SENSATION IN FEET: 0
OCCASIONAL FEELINGS OF UNSTEADINESS: 0
DEPRESSION: 0

## 2025-07-15 ASSESSMENT — PAIN SCALES - GENERAL: PAINLEVEL_OUTOF10: 2

## 2025-07-18 LAB
ANION GAP SERPL CALCULATED.4IONS-SCNC: 9 MMOL/L (CALC) (ref 7–17)
BUN SERPL-MCNC: 15 MG/DL (ref 7–25)
BUN/CREAT SERPL: 13 (CALC) (ref 6–22)
CALCIUM SERPL-MCNC: 9.2 MG/DL (ref 8.6–10.4)
CHLORIDE SERPL-SCNC: 106 MMOL/L (ref 98–110)
CO2 SERPL-SCNC: 24 MMOL/L (ref 20–32)
CREAT SERPL-MCNC: 1.15 MG/DL (ref 0.6–1)
EGFRCR SERPLBLD CKD-EPI 2021: 51 ML/MIN/1.73M2
GLUCOSE SERPL-MCNC: 89 MG/DL (ref 65–99)
MAGNESIUM SERPL-MCNC: 2.4 MG/DL (ref 1.5–2.5)
POTASSIUM SERPL-SCNC: 4.3 MMOL/L (ref 3.5–5.3)
SODIUM SERPL-SCNC: 139 MMOL/L (ref 135–146)

## 2025-07-18 NOTE — PROGRESS NOTES
"Returns for check up prior to going out of the country.        History Of Present Illness:    Sandy Smith is a 70 y.o. year old female patient   She has a diagnosis of NICM at least since 2018. At that time her EF was 35-40%. She was referred for evaluation of frequent PVC,s and nonsustained VT. 10/21/20. She was then admitted with hypertension and \"bradycardia\" which was due to frequent PVC's. Her echo 2020 showed EF 20% and cath showed no obstructive CAD. After discharge she wore a monitor which showed PVC burden of 39%. she has multiple morphologies of PVC and non sustained VT up to 44 beats at very rapid rates and likely not monomorphic.  She underwent RFA for PVC and initiation of amiodarone. 10/2020. Recent holter showed PVC burden now 0.7%, She wore the lifevest but developed rib soreness and after recent echo showed some improvement in her EF 25-30% we discontinued it.     She then underwent implantation of an ICD due to continue low EF.   On amiodarone we did not see any recurrence and recently EF was noted to be recovered. The amiodarone was discontinued for her to be able to take treatment for hepatitis C.  Off of amiodarone she has not had any recurrent arrhythmias.   Her only symptom at this time is of left breat heaviness which she feels is related to weight loss and the ICD.       TODAY returns to EP clinic about 18mos since seen in our office not including device clinic. She follows closely with Dr. Haddad for general cardiology. She is going to [place] for a couple months next week. She just wanted to check in and make sure things were okay prior to leaving. She notes rare fluttering sensation over her R clavicle at times, this lasts a few seconds. She thinks is muscle related and depends on her sleeping position. She currently denies cp, sob, palpitations, LHDizziness, orthopnea, edema, bleeding, fever/chills. feels overall well. Complying with medications and device checks.  ECG 7/15/25 " Apacing  V sensing 74 bpm         ROS:  Constitutional: not feeling poorly, no fever and no chills.   ENT: no nose bleeds  Cardiovascular: no chest pain, no tightness or heavy pressure, no shortness of breath, no palpitations, no lower extremity edema and as noted in HPI.   Respiratory: no cough, no shortness of breath during exertion, no PND, no orthopnea.   Gastrointestinal: no nausea, no vomiting, no melena  Genitourinary: no hematuria.   Musculoskeletal: no difficulty walking.   Neurological: no dizziness and no fainting.   Endocrine: no thyroid issues         Past Medical History:  Past Medical History:   Diagnosis Date    Cardiomyopathy, unspecified     CHF (congestive heart failure)     CKD (chronic kidney disease)     Encounter for general adult medical examination without abnormal findings 01/31/2022    Encounter for initial preventive physical examination covered by Medicare    Hypertension     Otitis media, unspecified, right ear 01/31/2014    Acute right otitis media    Personal history of other diseases of the digestive system 07/21/2013    History of esophageal reflux    Personal history of other diseases of the musculoskeletal system and connective tissue 07/21/2013    History of low back pain    Personal history of other specified conditions 07/21/2013    History of angioedema       Past Surgical History:  Past Surgical History:   Procedure Laterality Date    BONE GRAFT Left     CARDIAC DEFIBRILLATOR PLACEMENT      COLONOSCOPY  08/23/2013    Colonoscopy (Fiberoptic)    CT ANGIO CORONARY ART WITH HEARTFLOW IF SCORE >30%  11/20/2018    CT HEART CORONARY ANGIOGRAM 11/20/2018 [facility] [place] ANCILLARY LEGACY    CT ANGIO NECK  01/29/2022    CT NECK ANGIO W AND WO IV CONTRAST 1/29/2022 [facility] CLINICAL LEGACY    KNEE SURGERY Left     OTHER SURGICAL HISTORY  02/08/2021    Pacemaker insertion    OTHER SURGICAL HISTORY  11/08/2018    Umbilical hernia repair    OTHER SURGICAL HISTORY  12/05/2013    Wrist Surgery           Social History:  Caffeine: 1 cup tea 3-4 days/week; chocolate   Cigarettes: none  ETOH: occ - 1 - 1 1/2 drinks/month  Drugs: none  Exercise: subcontractor - cleaning/moving     Occ: active job  Marital status:  SO    Family History:  Family History   Problem Relation Name Age of Onset    Aneurysm Mother      Heart disease Mother      Hyperlipidemia Mother      Hypertension Mother      Heart disease Father      Hypertension Father      Other (cardiac disorder) Sister      Coronary artery disease Sister      Other (cardiac disorder) Brother      Coronary artery disease Brother           Allergies:  Allergies   Allergen Reactions    Cephalexin Unknown    Lisinopril Angioedema        Outpatient Medications:  Current Outpatient Medications   Medication Instructions    acetaminophen (TYLENOL) 650 mg, oral, Every 6 hours PRN    calcium carbonate (Antacid, calcium carbonate,) 500 mg (200 mg elemental) chewable tablet 1 tablet, oral, 2 times daily    cholecalciferol, vitamin D3, 10 mcg (400 unit) capsule TAKE 1 CAPSULE Daily    ciclopirox (Penlac) 8 % solution Topical, Nightly    ciclopirox (Penlac) 8 % solution Apply topically nightly at bedtime Brush lightly on toenails every day.  Remove once a week with alcohol for 48 weeks..    cyclobenzaprine (FLEXERIL) 10 mg, oral, 3 times daily PRN    empagliflozin (Jardiance) 10 mg tablet TAKE 1 TABLET BY MOUTH ONCE DAILY    furosemide (LASIX) 20 mg, oral, Every other day    hydrALAZINE (Apresoline) 10 mg tablet TAKE 1 TABLET BY MOUTH EVERY 8 HOURS    ibuprofen 800 mg tablet take 1 tablet by mouth every 6-8 hours as needed for pain    ipratropium (Atrovent) 21 mcg (0.03 %) nasal spray 2 sprays, Each Nostril, Every 12 hours    isosorbide dinitrate (Isordil) 20 mg tablet TAKE 1 TABLET BY MOUTH THREE TIMES A DAY    lidocaine (Xylocaine) 5 % cream cream 1 Application, Topical, Every 6 hours PRN    metoprolol succinate XL (TOPROL-XL) 25 mg, oral, Daily    NaCl-NaHCO3-hyaluron sod-aloe  "(NasogeL) 0.9 % spray gel USE AS DIRECTED.    naproxen (NAPROSYN) 500 mg, 2 times daily    sodium chloride (Ocean) 0.65 % nasal spray 1 spray, Each Nostril, As needed    spironolactone (Aldactone) 25 mg tablet TAKE 1 TABLET (25 MG) BY MOUTH ONCE DAILY.         Last Recorded Vitals:      1/13/2025    12:42 PM 2/23/2025     2:04 PM 3/12/2025    10:55 AM 3/13/2025     9:00 AM 5/5/2025    11:39 AM 6/24/2025     9:45 AM 7/15/2025    10:35 AM   Vitals   Systolic 125 132 112 109 97 103 103   Diastolic 83 86 76 72 61 67 67   BP Location Right arm Left arm   Right arm Right arm Left arm   Heart Rate 72 63 84 74 84 79 74   Temp 36.7 °C (98.1 °F) 37.1 °C (98.7 °F)   36.7 °C (98.1 °F) 36.8 °C (98.2 °F)    Resp 18 18   18 18    Height 1.499 m (4' 11\") 1.499 m (4' 11\")  1.477 m (4' 10.15\") 1.499 m (4' 11\") 1.499 m (4' 11\") 1.499 m (4' 11\")   Weight (lb) 122 122 122 122.36 125 124.8 124.6   BMI 24.64 kg/m2 24.64 kg/m2 24.64 kg/m2 25.44 kg/m2 25.25 kg/m2 25.21 kg/m2 25.17 kg/m2   BSA (m2) 1.52 m2 1.52 m2 1.52 m2 1.51 m2 1.54 m2 1.53 m2 1.53 m2   Visit Report   Report  Report Report Report        Physical Exam:  Physical Exam  Constitutional:       Appearance: Normal appearance.   HENT:      Head: Normocephalic.      Nose: Nose normal.   Neck:      Vascular: No carotid bruit.     Cardiovascular:      Rate and Rhythm: Normal rate and regular rhythm.      Heart sounds: Normal heart sounds. No murmur heard.     No friction rub. No gallop.   Pulmonary:      Breath sounds: Normal breath sounds.   Chest:        Comments: Incision well healed Non tender      Musculoskeletal:         General: Normal range of motion.      Right lower leg: No edema.      Left lower leg: No edema.     Skin:     General: Skin is warm and dry.     Neurological:      General: No focal deficit present.      Mental Status: She is alert and oriented to person, place, and time.     Psychiatric:         Mood and Affect: Mood normal.         Behavior: Behavior normal. "          Last Cardiology Tests:  Echo:  8/2023    Compared to previous echo on 12/18/2020, the ejection fraction had recovered from 25-30% to 50-55% on this study.        CONCLUSIONS:  1. Left ventricular systolic function is low normal with a 50-55% estimated ejection fraction.  2. Abnormal septal motion consistent with RV pacemaker.  3. Spectral Doppler shows an impaired relaxation pattern of left ventricular diastolic filling.  4. There is reduced right ventricular systolic function.  5. There is moderate mitral annular calcification.  6. RVSP within normal limits.  7. Compared to previous echo on 12/18/2020, the ejection fraction had recovered from 25-30% to 50-55% on this study.       Cardiac Device Check:  Prior remotes without significant arrhythmia.      Lab review: I have Chemistry CMP:   Lab Results   Component Value Date    ALBUMIN 4.3 01/13/2025    CALCIUM 9.2 07/17/2025    CO2 24 07/17/2025    CREATININE 1.15 (H) 07/17/2025    GLUCOSE 89 07/17/2025    BILITOT 0.8 01/13/2025    PROT 8.2 01/13/2025    ALT 32 01/13/2025    AST 29 01/13/2025    ALKPHOS 59 01/13/2025   , Chemistry BMP   Lab Results   Component Value Date    GLUCOSE 89 07/17/2025    CALCIUM 9.2 07/17/2025    CO2 24 07/17/2025    CREATININE 1.15 (H) 07/17/2025   , and CBC:  Lab Results   Component Value Date    WBC 6.2 01/13/2025    RBC 5.56 (H) 01/13/2025    HGB 15.1 01/13/2025    HCT 46.5 (H) 01/13/2025    MCV 84 01/13/2025    MCH 27.2 01/13/2025    MCHC 32.5 01/13/2025    RDW 13.7 01/13/2025    MPV 11.3 10/17/2023    NRBC 0.0 01/13/2025       Assessment/Plan   70 female with NICM s/p ICD, frequent PVCS then s/p RFA for PVC and initiation of amiodarone. 10/2020. Recent holter showed PVC burden now 0.7%, She wore the lifevest but developed rib soreness and after recent echo showed some improvement in her EF 25-30% we discontinued it. doing well on device checks. Then EF recovered 2023 55%.     Today she present to EP clinic (last visit with  us was 18mo ago). Following with DR. Hdadad for gen cards and our device clinic for her ICD. She has been doing overall well from the arrhythmia standpoint and just wanted to check in because she is going out of [place]. Her ECG looks stable in office, Apacing and V sensing 74 bpm.  Only 1 PVC on running strip. Recent remote with no significant arrhythmias. NO shocks, no Lh/dizziness/near syncope. Will repeat some basic labs, make sure electrolytes stable.     PLAN  Continue with cardiac medications  Check labs today.  Follow up with Dr. Haddad as planned this fall.   Continue device checks q 3mo.  Follow up with EP annually in office/device.

## 2025-07-31 ENCOUNTER — APPOINTMENT (OUTPATIENT)
Dept: OPHTHALMOLOGY | Facility: CLINIC | Age: 70
End: 2025-07-31
Payer: COMMERCIAL

## 2025-09-12 ENCOUNTER — APPOINTMENT (OUTPATIENT)
Dept: CARDIOLOGY | Facility: CLINIC | Age: 70
End: 2025-09-12
Payer: MEDICARE

## 2025-10-27 ENCOUNTER — APPOINTMENT (OUTPATIENT)
Dept: PHARMACY | Facility: HOSPITAL | Age: 70
End: 2025-10-27
Payer: MEDICARE

## 2026-03-26 ENCOUNTER — APPOINTMENT (OUTPATIENT)
Dept: CARDIOLOGY | Facility: CLINIC | Age: 71
End: 2026-03-26
Payer: MEDICARE

## (undated) DEVICE — GLOVE, SURGICAL, BIOGEL, OPTIFIT, SZ 8.0

## (undated) DEVICE — SOLUTION, IRRIGATION, X RX SODIUM CHL 0.9%, 1000ML BTL

## (undated) DEVICE — INSTRUMENT, CENTERLINE ENDO SOFT TISSUE RELEASE

## (undated) DEVICE — SOLUTION, CHLORHEXIDINE, 4%, 4OZ

## (undated) DEVICE — SUTURE, PROLENE 4-0, 18IN, PS2, BLUE MONO

## (undated) DEVICE — TRAY, DRY PREP, PREMIUM

## (undated) DEVICE — BLANKET, LOWER BODY, VHA PLUS, ADULT

## (undated) DEVICE — Device

## (undated) DEVICE — CUFF, TOURNIQUET, 18 X 4, DUAL PORT/SNGL BLADDER, DISP, LF

## (undated) DEVICE — DRESSING, GAUZE, PETROLATUM, PATCH, XEROFORM, 1 X 8 IN, STERILE